# Patient Record
Sex: FEMALE | Race: WHITE | NOT HISPANIC OR LATINO | URBAN - METROPOLITAN AREA
[De-identification: names, ages, dates, MRNs, and addresses within clinical notes are randomized per-mention and may not be internally consistent; named-entity substitution may affect disease eponyms.]

---
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
## 2023-06-26 ENCOUNTER — TELEPHONE (OUTPATIENT)
Dept: OBGYN CLINIC | Facility: MEDICAL CENTER | Age: 23
End: 2023-06-26

## 2023-06-26 DIAGNOSIS — N92.6 MISSED MENSES: Primary | ICD-10-CM

## 2023-06-26 NOTE — TELEPHONE ENCOUNTER
Patient called and states she is 3 weeks pregnant and is aware of getting blood work done  Patient did call back to let us know that she is spotting  Patient would like to speak to someone about her concern  Please review when you get a chance   Thank you

## 2023-06-26 NOTE — TELEPHONE ENCOUNTER
Patient called about a positive pregnancy test  lmp 05/08/23  Patient aware to get labs done tomorrow  Please place labs into chart   Thank you

## 2023-06-26 NOTE — TELEPHONE ENCOUNTER
Discussed concerns with patient  Bleeding precautions reviewed  She is c/o spotting, no blood clots  Not filling out a pad  Denies any cramping, recent intercourse  Advised to keep an eye on it and get blood work done  Patient aware to call if symptoms do not improve  Orders for blood work placed

## 2023-06-27 ENCOUNTER — APPOINTMENT (OUTPATIENT)
Dept: LAB | Facility: MEDICAL CENTER | Age: 23
End: 2023-06-27
Payer: COMMERCIAL

## 2023-06-27 DIAGNOSIS — N92.6 MISSED MENSES: ICD-10-CM

## 2023-06-27 LAB
ABO GROUP BLD: NORMAL
ABO GROUP BLD: NORMAL
B-HCG SERPL-ACNC: 141 MIU/ML (ref 0–5)
B-HCG SERPL-ACNC: 141 MIU/ML (ref 0–5)
BLD GP AB SCN SERPL QL: NEGATIVE
BLD GP AB SCN SERPL QL: NEGATIVE
PROGEST SERPL-MCNC: 0.74 NG/ML
PROGEST SERPL-MCNC: 0.74 NG/ML
RH BLD: NEGATIVE
RH BLD: NEGATIVE
SPECIMEN EXPIRATION DATE: NORMAL
SPECIMEN EXPIRATION DATE: NORMAL

## 2023-06-27 PROCEDURE — 86850 RBC ANTIBODY SCREEN: CPT

## 2023-06-27 PROCEDURE — 84144 ASSAY OF PROGESTERONE: CPT

## 2023-06-27 PROCEDURE — 86901 BLOOD TYPING SEROLOGIC RH(D): CPT

## 2023-06-27 PROCEDURE — 36415 COLL VENOUS BLD VENIPUNCTURE: CPT

## 2023-06-27 PROCEDURE — 84702 CHORIONIC GONADOTROPIN TEST: CPT

## 2023-06-27 PROCEDURE — 86900 BLOOD TYPING SEROLOGIC ABO: CPT

## 2023-06-28 DIAGNOSIS — Z32.01 POSITIVE PREGNANCY TEST: Primary | ICD-10-CM

## 2023-06-30 ENCOUNTER — APPOINTMENT (OUTPATIENT)
Dept: LAB | Facility: MEDICAL CENTER | Age: 23
End: 2023-06-30
Payer: COMMERCIAL

## 2023-06-30 DIAGNOSIS — Z32.01 POSITIVE PREGNANCY TEST: ICD-10-CM

## 2023-06-30 LAB
B-HCG SERPL-ACNC: 25 MIU/ML (ref 0–5)
B-HCG SERPL-ACNC: 25 MIU/ML (ref 0–5)

## 2023-06-30 PROCEDURE — 36415 COLL VENOUS BLD VENIPUNCTURE: CPT

## 2023-06-30 PROCEDURE — 84702 CHORIONIC GONADOTROPIN TEST: CPT

## 2023-07-03 ENCOUNTER — TELEPHONE (OUTPATIENT)
Dept: OBGYN CLINIC | Facility: MEDICAL CENTER | Age: 23
End: 2023-07-03

## 2023-07-03 ENCOUNTER — CLINICAL SUPPORT (OUTPATIENT)
Dept: OBGYN CLINIC | Facility: MEDICAL CENTER | Age: 23
End: 2023-07-03
Payer: COMMERCIAL

## 2023-07-03 DIAGNOSIS — Z32.01 POSITIVE PREGNANCY TEST: Primary | ICD-10-CM

## 2023-07-03 DIAGNOSIS — O20.0 THREATENED ABORTION: ICD-10-CM

## 2023-07-03 DIAGNOSIS — Z29.13 NEED FOR RHOGAM DUE TO RH NEGATIVE MOTHER: ICD-10-CM

## 2023-07-03 DIAGNOSIS — O20.9 VAGINAL BLEEDING IN PREGNANCY, FIRST TRIMESTER: Primary | ICD-10-CM

## 2023-07-03 PROCEDURE — 96372 THER/PROPH/DIAG INJ SC/IM: CPT | Performed by: NURSE PRACTITIONER

## 2023-07-03 NOTE — PROGRESS NOTES
Patient here for Rhogam injection. Denies any current vaginal bleeding or any other symptoms. Given IM in right deltoid. Tolerated well. Office supplied.     [address]: [id number]  LOT: [id number]  EXP: 03/09/25

## 2023-07-03 NOTE — TELEPHONE ENCOUNTER
Spoke with patient and reviewed labs. Pt aware of completing follow up hcg in 1 week.  Reviewed blood type and patient is negative and advised to come into office for rhogam this pm. She is agreeable and scheduled

## 2023-07-03 NOTE — TELEPHONE ENCOUNTER
PT would like a call back to discuss miscarriage. PT would like a call back @ [phone]. Thank you.

## 2023-07-06 ENCOUNTER — OFFICE VISIT (OUTPATIENT)
Dept: OBGYN CLINIC | Facility: MEDICAL CENTER | Age: 23
End: 2023-07-06
Payer: COMMERCIAL

## 2023-07-06 VITALS
WEIGHT: 183.2 LBS | BODY MASS INDEX: 32.46 KG/M2 | SYSTOLIC BLOOD PRESSURE: 120 MMHG | DIASTOLIC BLOOD PRESSURE: 74 MMHG | HEIGHT: 63 IN

## 2023-07-06 DIAGNOSIS — Z67.91 BLOOD TYPE, RH NEGATIVE: ICD-10-CM

## 2023-07-06 DIAGNOSIS — O03.9 COMPLETE MISCARRIAGE: Primary | ICD-10-CM

## 2023-07-06 PROCEDURE — 99203 OFFICE O/P NEW LOW 30 MIN: CPT | Performed by: STUDENT IN AN ORGANIZED HEALTH CARE EDUCATION/TRAINING PROGRAM

## 2023-07-06 RX ORDER — HYDROXYZINE HYDROCHLORIDE 25 MG/1
25 TABLET, FILM COATED ORAL EVERY 6 HOURS PRN
COMMUNITY

## 2023-07-06 RX ORDER — TOPIRAMATE 50 MG/1
TABLET, FILM COATED ORAL
COMMUNITY
Start: 2023-06-15

## 2023-07-06 RX ORDER — QUETIAPINE FUMARATE 200 MG/1
TABLET, FILM COATED ORAL
COMMUNITY
Start: 2023-06-29

## 2023-07-06 NOTE — PROGRESS NOTES
[facility] [address] [place] [address]    Assessment/Plan:  Cristy Rangel is a 21 y.o.  who presents to establish care after complete miscarriage. Blood type, Rh negative  - She has received Rhogam    Complete miscarriage  - Reviewed miscarriage counseling  - PHQ-2 is 0  - Discussed plan for future pregnancy, no high-quality data to support delaying conception  - Recommend folate supplementation via prenatal vitamin  - Recommend she return for annual with cervical cancer screening    Diagnoses and all orders for this visit:    Complete miscarriage  -     CBC and Platelet; Future  -     TSH, 3rd generation with Free T4 reflex; Future  -     HEMOGLOBIN A1C W/ EAG ESTIMATION; Future    Blood type, Rh negative            Subjective:   Cristy Rangel is a 21 y.o. No obstetric history on file. female. CC: Miscarriage follow up    HPI: Lauren Rosales presents for miscarriage follow up. Pregnancy was planned and desired. ROS: Review of Systems   Constitutional: Negative for chills and fever. Respiratory: Negative for cough and shortness of breath. Cardiovascular: Negative for chest pain and leg swelling. Gastrointestinal: Negative for abdominal pain, nausea and vomiting. Genitourinary: Negative for dysuria, frequency and urgency. Neurological: Negative for dizziness, light-headedness and headaches. PFSH: The following portions of the patient's history were reviewed and updated as appropriate: allergies, current medications, past family history, past medical history, obstetric history, gynecologic history, past social history, past surgical history and problem list.       Objective:  /74   Ht 5' 3" (1.6 m)   Wt 83.1 kg (183 lb 3.2 oz)   BMI 32.45 kg/m²    Physical Exam  Constitutional:       Appearance: Normal appearance. Cardiovascular:      Rate and Rhythm: Normal rate.    Pulmonary:      Effort: Pulmonary effort is normal.   Neurological: Mental Status: She is alert.    Psychiatric:         Mood and Affect: Mood normal.         Behavior: Behavior normal.           Paul Novak MD  [id number] [address]  7/6/2023 2:30 PM

## 2023-07-06 NOTE — ASSESSMENT & PLAN NOTE
- Reviewed miscarriage counseling  - PHQ-2 is 0  - Discussed plan for future pregnancy, no high-quality data to support delaying conception  - Recommend folate supplementation via prenatal vitamin  - Recommend she return for annual with cervical cancer screening

## 2023-08-09 ENCOUNTER — TELEPHONE (OUTPATIENT)
Dept: OBGYN CLINIC | Facility: MEDICAL CENTER | Age: 23
End: 2023-08-09

## 2023-08-09 ENCOUNTER — OFFICE VISIT (OUTPATIENT)
Dept: OBGYN CLINIC | Facility: MEDICAL CENTER | Age: 23
End: 2023-08-09
Payer: COMMERCIAL

## 2023-08-09 VITALS
DIASTOLIC BLOOD PRESSURE: 74 MMHG | BODY MASS INDEX: 31.45 KG/M2 | SYSTOLIC BLOOD PRESSURE: 118 MMHG | HEIGHT: 63 IN | WEIGHT: 177.5 LBS

## 2023-08-09 DIAGNOSIS — Z30.011 ENCOUNTER FOR INITIAL PRESCRIPTION OF CONTRACEPTIVE PILLS: Primary | ICD-10-CM

## 2023-08-09 PROCEDURE — 99213 OFFICE O/P EST LOW 20 MIN: CPT | Performed by: OBSTETRICS & GYNECOLOGY

## 2023-08-09 RX ORDER — DROSPIRENONE AND ETHINYL ESTRADIOL 0.02-3(28)
1 KIT ORAL DAILY
Qty: 84 TABLET | Refills: 1 | Status: SHIPPED | OUTPATIENT
Start: 2023-08-09

## 2023-08-09 RX ORDER — TOPIRAMATE 100 MG/1
100 TABLET, FILM COATED ORAL 2 TIMES DAILY
COMMUNITY
Start: 2023-07-19

## 2023-08-09 RX ORDER — HYDROXYZINE PAMOATE 25 MG/1
CAPSULE ORAL
COMMUNITY
Start: 2023-08-01

## 2023-08-09 RX ORDER — ROPINIROLE 1 MG/1
1 TABLET, FILM COATED ORAL
COMMUNITY
Start: 2023-07-19

## 2023-08-09 NOTE — PROGRESS NOTES
Assessment:  21 y.o.  who presents for contraception. Desires OCP. Discussed effects of topamax on OCP efficacy. Doses </= 200mg not associated with dec efficacy, but encouraged to call if dose increased. Plan:  Diagnoses and all orders for this visit:    Encounter for initial prescription of contraceptive pills  -     drospirenone-ethinyl estradiol (LUIS) 3-0.02 MG per tablet; Take 1 tablet by mouth daily  - Return in 3mo for OCP f/u         __________________________________________________________________    Subjective   Judit Nayak is a 21 y.o. [place] who presents to discuss contraception. Not using presently. Main goal is pregnancy prevention and desires OCP. Hx OCP use. Tried a few brands before, no issues with any. Menses are largely regular and no overt concerns/symptoms. Medical hx notable for migraines which she takes topamax 100mg BID for. No aura. No notable cyclic changes in headache pattern or exacerbation with prior OCP use. No hx VTE or family hx of same. Discussed contraception in detail -- pill, patch, ring, depo, iud, nexplanon. Prefers cyclic methods and focused conversation on options here. Discussed importance of taking/changing method on time, side effects, and efficacy changes with meds like topamax. Discussed immediate vs cyclic start, and given recent menses will start immediately. Questions answered to pt satisfaction. The following portions of the patient's history were reviewed and updated as appropriate: allergies, current medications, past medical history, past social history, past surgical history and problem list.    Review of Systems  Review of Systems   Constitutional: Negative for chills and fever. Respiratory: Negative for shortness of breath. Cardiovascular: Negative for chest pain and palpitations. Gastrointestinal: Negative for abdominal distention, abdominal pain, constipation, diarrhea, nausea and vomiting.    Genitourinary: Negative for dysuria, flank pain, genital sores, menstrual problem, pelvic pain, vaginal bleeding, vaginal discharge and vaginal pain. Neurological: Negative for dizziness, light-headedness and headaches. Objective  /74   Ht 5' 3" (1.6 m)   Wt 80.5 kg (177 lb 8 oz)   LMP 08/03/2023 (Exact Date)   BMI 31.44 kg/m²      Physical Exam:  Physical Exam  Constitutional:       General: She is not in acute distress. Appearance: Normal appearance. She is not ill-appearing, toxic-appearing or diaphoretic. Eyes:      General: No scleral icterus. Right eye: No discharge. Left eye: No discharge. Conjunctiva/sclera: Conjunctivae normal.   Cardiovascular:      Rate and Rhythm: Normal rate. Pulmonary:      Effort: Pulmonary effort is normal. No respiratory distress. Abdominal:      General: There is no distension. Palpations: There is no mass. Tenderness: There is no abdominal tenderness. There is no guarding or rebound. Hernia: No hernia is present. Musculoskeletal:         General: No swelling. Skin:     General: Skin is warm and dry. Coloration: Skin is not jaundiced or pale. Findings: No bruising or erythema. Neurological:      Mental Status: She is alert. Psychiatric:         Mood and Affect: Mood normal.         Behavior: Behavior normal.         Thought Content:  Thought content normal.         Judgment: Judgment normal.

## 2023-08-11 ENCOUNTER — TELEPHONE (OUTPATIENT)
Dept: OBGYN CLINIC | Facility: MEDICAL CENTER | Age: 23
End: 2023-08-11

## 2023-08-11 NOTE — TELEPHONE ENCOUNTER
Patient lmom for office to discuss her birth control. Upon return call patient stated that she has had some irregular and spotting since starting her bc. Pt is aware it may take up to 3-4 months for the body to get used to the medication. Pt states bleeding is only when she wipes. Cramping is very minimal and consistent with menses. Advised patient to keep an eye on the bleeding and call the office if increases to filling a pad in less than an hour.  Pt aware and agreeable

## 2023-11-06 ENCOUNTER — OFFICE VISIT (OUTPATIENT)
Dept: URGENT CARE | Facility: CLINIC | Age: 23
End: 2023-11-06
Payer: COMMERCIAL

## 2023-11-06 VITALS
HEART RATE: 113 BPM | OXYGEN SATURATION: 99 % | TEMPERATURE: 98 F | RESPIRATION RATE: 18 BRPM | HEIGHT: 63 IN | BODY MASS INDEX: 31.44 KG/M2 | DIASTOLIC BLOOD PRESSURE: 91 MMHG | SYSTOLIC BLOOD PRESSURE: 137 MMHG

## 2023-11-06 DIAGNOSIS — N93.9 VAGINAL BLEEDING: Primary | ICD-10-CM

## 2023-11-06 LAB — SL AMB POCT URINE HCG: NEGATIVE

## 2023-11-06 PROCEDURE — 81025 URINE PREGNANCY TEST: CPT | Performed by: NURSE PRACTITIONER

## 2023-11-06 PROCEDURE — 99213 OFFICE O/P EST LOW 20 MIN: CPT | Performed by: NURSE PRACTITIONER

## 2023-11-06 RX ORDER — QUETIAPINE FUMARATE 50 MG/1
TABLET, FILM COATED ORAL
COMMUNITY
Start: 2023-09-20

## 2023-11-06 RX ORDER — VENLAFAXINE HYDROCHLORIDE 75 MG/1
150 CAPSULE, EXTENDED RELEASE ORAL
COMMUNITY
Start: 2023-08-25

## 2023-11-06 RX ORDER — VALACYCLOVIR HYDROCHLORIDE 1 G/1
TABLET, FILM COATED ORAL
COMMUNITY
Start: 2023-10-28

## 2023-11-06 RX ORDER — NAPROXEN 500 MG/1
500 TABLET ORAL EVERY 12 HOURS PRN
COMMUNITY
Start: 2023-10-12 | End: 2024-10-11

## 2023-11-06 NOTE — PROGRESS NOTES
[facility]        NAME: Oscar Lezama is a 21 y.o. female  : 2000    MRN: [id number]  DATE: 2023  TIME: 1:43 PM    Assessment and Plan   Vaginal bleeding [N93.9]  1. Vaginal bleeding  POCT urine HCG        Advised to call GYN for lab work for evaluation as her urine pregnancy test is negative and she is Rh-. Patient verbalized understanding. Patient asked for work note for yesterday. Work note provided stating she was seen today and cleared to return to work. Patient agreement plan. Patient Instructions     Follow up with PCP in 3-5 days. Proceed to  ER if symptoms worsen. Chief Complaint     Chief Complaint   Patient presents with    Vaginal Bleeding     Patient states that she think that she might be having a miscarriage. She has a headache, heavy bleeding and vomiting. She states that she had one         History of Present Illness   Oscar Lezama presents to the clinic c/o    Vaginal Bleeding (Patient states that she think that she might be having a miscarriage. She has a headache, heavy bleeding and vomiting. She states that she had one)  Patient states had a miscarriage in . She states her bleeding is having 1 day not being the max having again after that. Has had vomiting, nausea and headache. She did not do a urine pregnancy test.  She states she is Rh- that is why she is concerned. She started a new birth control 3 months ago in the past month and a half she has had vaginal bleeding every other week. Has a follow-up to discuss birth control with GYN in about 7 days. Review of Systems   Review of Systems   All other systems reviewed and are negative.         Current Medications     Long-Term Medications   Medication Sig Dispense Refill    drospirenone-ethinyl estradiol (LUIS) 3-0.02 MG per tablet Take 1 tablet by mouth daily 84 tablet 1    hydrOXYzine HCL (ATARAX) 25 mg tablet Take 25 mg by mouth every 6 (six) hours as needed hydrOXYzine pamoate (VISTARIL) 25 mg capsule TAKE 1 CAPSULE BY MOUTH EVERY SIX HOURS FOR ANXIETY      naproxen (NAPROSYN) 500 mg tablet Take 500 mg by mouth every 12 (twelve) hours as needed      QUEtiapine (SEROquel) 200 mg tablet TAKE 1.5 TABLETS (300 MG TOTAL) BY MOUTH NIGHTLY. QUEtiapine (SEROquel) 50 mg tablet TAKE 1 TABLET BY MOUTH AT BEDTIME TAKE WITH 200MG = 250 AT BEDTIME      rOPINIRole (REQUIP) 1 mg tablet Take 1 mg by mouth daily at bedtime      topiramate (TOPAMAX) 100 mg tablet Take 100 mg by mouth 2 (two) times a day      venlafaxine (EFFEXOR-XR) 75 mg 24 hr capsule 150 mg      topiramate (TOPAMAX) 50 MG tablet TAKE 1 TABLET BY MOUTH EVERY 12 HOURS AS DIRECTED (Patient not taking: Reported on 8/9/2023)      valACYclovir (VALTREX) 1,000 mg tablet TAKE 1 TABLET BY MOUTH TWICE A DAY FOR 7 DAYS (Patient not taking: Reported on 11/6/2023)         Current Allergies     Allergies as of 11/06/2023 - Reviewed 11/06/2023   Allergen Reaction Noted    Bactrim [sulfamethoxazole-trimethoprim] Itching 01/29/2018    Latex Itching 10/09/2020    Nickel Itching 07/26/2021    Amoxicillin Diarrhea 01/29/2018    Penicillins Diarrhea and Hives 03/17/2015            The following portions of the patient's history were reviewed and updated as appropriate: allergies, current medications, past family history, past medical history, past social history, past surgical history and problem list.    Objective   /91   Pulse (!) 113   Temp 98 °F (36.7 °C) (Tympanic)   Resp 18   Ht 5' 3" (1.6 m)   LMP  (LMP Unknown)   SpO2 99%   BMI 31.44 kg/m²        Physical Exam     Physical Exam  Vitals and nursing note reviewed. Constitutional:       Appearance: Normal appearance. She is well-developed. HENT:      Head: Normocephalic and atraumatic.       Right Ear: Hearing, tympanic membrane, ear canal and external ear normal.      Left Ear: Hearing, tympanic membrane, ear canal and external ear normal.      Nose: Nose normal. Mouth/Throat:      Lips: Pink. Mouth: Mucous membranes are moist.      Pharynx: Oropharynx is clear. Eyes:      General: Lids are normal.      Conjunctiva/sclera: Conjunctivae normal.      Pupils: Pupils are equal, round, and reactive to light. Cardiovascular:      Rate and Rhythm: Normal rate and regular rhythm. Heart sounds: Normal heart sounds, S1 normal and S2 normal.   Pulmonary:      Effort: Pulmonary effort is normal.      Breath sounds: Normal breath sounds. Abdominal:      General: Abdomen is flat. Bowel sounds are normal.      Palpations: Abdomen is soft. Musculoskeletal:         General: Normal range of motion. Cervical back: Full passive range of motion without pain, normal range of motion and neck supple. Skin:     General: Skin is warm and dry. Neurological:      General: No focal deficit present. Mental Status: She is alert and oriented to person, place, and time. Psychiatric:         Mood and Affect: Mood normal.         Speech: Speech normal.         Behavior: Behavior normal. Behavior is cooperative. Thought Content:  Thought content normal.         Judgment: Judgment normal.

## 2023-11-06 NOTE — LETTER
November 6, 2023     Patient: Krystina Sanchez   YOB: 2000   Date of Visit: 11/6/2023       To Whom It May Concern: It is my medical opinion that Krystina Sanchez may return to work on 11/7/2023 . If you have any questions or concerns, please don't hesitate to call.          Sincerely,        Zenobia Bumpers, CRNP    CC: No Recipients

## 2023-11-14 ENCOUNTER — ANNUAL EXAM (OUTPATIENT)
Dept: OBGYN CLINIC | Facility: MEDICAL CENTER | Age: 23
End: 2023-11-14
Payer: COMMERCIAL

## 2023-11-14 VITALS
BODY MASS INDEX: 28.56 KG/M2 | SYSTOLIC BLOOD PRESSURE: 110 MMHG | DIASTOLIC BLOOD PRESSURE: 66 MMHG | WEIGHT: 161.2 LBS | HEIGHT: 63 IN

## 2023-11-14 DIAGNOSIS — N93.9 ABNORMAL UTERINE BLEEDING (AUB): ICD-10-CM

## 2023-11-14 DIAGNOSIS — Z01.419 WELL WOMAN EXAM WITH ROUTINE GYNECOLOGICAL EXAM: Primary | ICD-10-CM

## 2023-11-14 DIAGNOSIS — Z30.41 ENCOUNTER FOR SURVEILLANCE OF CONTRACEPTIVE PILLS: ICD-10-CM

## 2023-11-14 PROCEDURE — 99395 PREV VISIT EST AGE 18-39: CPT | Performed by: OBSTETRICS & GYNECOLOGY

## 2023-11-14 PROCEDURE — G0145 SCR C/V CYTO,THINLAYER,RESCR: HCPCS | Performed by: OBSTETRICS & GYNECOLOGY

## 2023-11-14 RX ORDER — LEVONORGESTREL AND ETHINYL ESTRADIOL 0.1-0.02MG
1 KIT ORAL DAILY
Qty: 84 TABLET | Refills: 1 | Status: SHIPPED | OUTPATIENT
Start: 2023-11-14

## 2023-11-14 NOTE — PROGRESS NOTES
Assessment   21 y.o.  presenting for annual exam.     Plan   Diagnoses and all orders for this visit:    Well woman exam with routine gynecological exam  -     Liquid-based pap, screening  -     Molecular Hold Sample  - Continue SBE  - Return in 1yr for yearly    Abnormal uterine bleeding (AUB)  -     Follicle stimulating hormone; Future  -     Luteinizing hormone; Future  -     Estradiol; Future  -     TSH, 3rd generation with Free T4 reflex; Future  -     Prolactin; Future  -     US pelvis complete w transvaginal; Future  -     CBC and differential; Future  -     Anemia Panel w/Reflex, OB; Future    Encounter for surveillance of contraceptive pills  -     levonorgestrel-ethinyl estradiol (Aviane) 0.1-20 MG-MCG per tablet; Take 1 tablet by mouth daily  - Return in 3mo for OCP check    __________________________________________________________________      Subjective     Trevin Lucas is a 21 y.o.  with irregular menses who is sexually active and currently using contraception (Mariiaana Leyden) presenting for annual exam.     Getting menses 2x/mo. Occurring q2 wks. Clotting noted. Fareedjustino Rasmussen. Cramping like a menses/. Baseline pelvic pain. Pressure-like, midline. No radiation. GYN  Complaints: as above  Denies dyspareunia, genital discharge, genital ulcers, and vulvar/vaginal symptoms  Periods are regular every 2 weeks, lasting 5 days. Dysmenorrhea:moderate, occurring first 1-2 days of flow. Cyclic symptoms include none  Sexually active: Yes - single partner - male  Hx STI: HSV, new dx this year.    Hx Abnormal pap: never had  Last pap: n/a    OB         Complaints: denies  Denies urinary frequency, hematuria, urinary incontinence, and dysuria    BREAST  Complaints: denies  Denies: breast lump, breast tenderness, changed mole, dryness, nipple discharge, pruritus, rash, skin color change, and skin lesion(s)  Last mammogram: n/a  Personal hx: denies  Family hx: denies fhx of breast, uterine, ovarian, or colon cancers  Patient does do regular self-exams    GENERAL  PMH reviewed/updated and is as below. Patient does follow with a PCP. Works at [employer]  Denies domestic violence. SCREENING  Cervical Ca: pap collected  Breast Ca: SBE encouraged  Colon Ca: not indicated by age      History reviewed. No pertinent past medical history.     Past Surgical History:   Procedure Laterality Date    CYST REMOVAL      inguinal cyst    WISDOM TOOTH EXTRACTION           Current Outpatient Medications:     drospirenone-ethinyl estradiol (LUIS) 3-0.02 MG per tablet, Take 1 tablet by mouth daily, Disp: 84 tablet, Rfl: 1    hydrOXYzine HCL (ATARAX) 25 mg tablet, Take 25 mg by mouth every 6 (six) hours as needed, Disp: , Rfl:     naproxen (NAPROSYN) 500 mg tablet, Take 500 mg by mouth every 12 (twelve) hours as needed, Disp: , Rfl:     QUEtiapine (SEROquel) 200 mg tablet, TAKE 1.5 TABLETS (300 MG TOTAL) BY MOUTH NIGHTLY., Disp: , Rfl:     rOPINIRole (REQUIP) 1 mg tablet, Take 1 mg by mouth daily at bedtime, Disp: , Rfl:     topiramate (TOPAMAX) 100 mg tablet, Take 100 mg by mouth 2 (two) times a day, Disp: , Rfl:     venlafaxine (EFFEXOR-XR) 75 mg 24 hr capsule, 150 mg, Disp: , Rfl:     hydrOXYzine pamoate (VISTARIL) 25 mg capsule, TAKE 1 CAPSULE BY MOUTH EVERY SIX HOURS FOR ANXIETY (Patient not taking: Reported on 11/14/2023), Disp: , Rfl:     QUEtiapine (SEROquel) 50 mg tablet, TAKE 1 TABLET BY MOUTH AT BEDTIME TAKE WITH 200MG = 250 AT BEDTIME (Patient not taking: Reported on 11/14/2023), Disp: , Rfl:     topiramate (TOPAMAX) 50 MG tablet, TAKE 1 TABLET BY MOUTH EVERY 12 HOURS AS DIRECTED (Patient not taking: Reported on 8/9/2023), Disp: , Rfl:     valACYclovir (VALTREX) 1,000 mg tablet, TAKE 1 TABLET BY MOUTH TWICE A DAY FOR 7 DAYS (Patient not taking: Reported on 11/6/2023), Disp: , Rfl:     Allergies   Allergen Reactions    Bactrim [Sulfamethoxazole-Trimethoprim] Itching    Latex Itching    Nickel Itching     Skin turns green    Amoxicillin Diarrhea    Penicillins Diarrhea and Hives     Not 100% sure if the hives were from the antibiotics. Social History     Tobacco Use    Smoking status: Never    Smokeless tobacco: Never   Vaping Use    Vaping Use: Every day    Substances: Nicotine   Substance Use Topics    Alcohol use: No    Drug use: No             Objective  /66   Ht 5' 3" (1.6 m)   Wt 73.1 kg (161 lb 3.2 oz)   LMP 11/03/2023 (Approximate)   BMI 28.56 kg/m²      Physical Exam:  Physical Exam  Exam conducted with a chaperone present. Constitutional:       General: She is not in acute distress. Appearance: Normal appearance. She is well-developed. She is not ill-appearing, toxic-appearing or diaphoretic. HENT:      Head: Normocephalic and atraumatic. Eyes:      General: No scleral icterus. Right eye: No discharge. Left eye: No discharge. Conjunctiva/sclera: Conjunctivae normal.   Cardiovascular:      Rate and Rhythm: Normal rate. Pulmonary:      Effort: Pulmonary effort is normal. No accessory muscle usage or respiratory distress. Chest:   Breasts:     Breasts are symmetrical.      Right: No inverted nipple, mass, nipple discharge, skin change or tenderness. Left: No inverted nipple, mass, nipple discharge, skin change or tenderness. Abdominal:      General: There is no distension. Palpations: Abdomen is soft. There is no mass. Tenderness: There is no abdominal tenderness. There is no guarding or rebound. Genitourinary:     General: Normal vulva. Exam position: Lithotomy position. Labia:         Right: No rash, tenderness or lesion. Left: No rash, tenderness or lesion. Urethra: No prolapse, urethral swelling or urethral lesion. Vagina: No signs of injury. No vaginal discharge, erythema, tenderness, bleeding or lesions. Cervix: No cervical motion tenderness, discharge, friability, lesion or erythema.       Uterus: Not enlarged, not fixed and not tender. Adnexa:         Right: No mass, tenderness or fullness. Left: No mass, tenderness or fullness. Rectum: No external hemorrhoid. Normal anal tone. Lymphadenopathy:      Upper Body:      Right upper body: No axillary or pectoral adenopathy. Left upper body: No axillary or pectoral adenopathy. Skin:     General: Skin is warm and dry. Findings: No erythema or rash. Neurological:      Mental Status: She is alert. Psychiatric:         Mood and Affect: Mood normal.         Behavior: Behavior normal.         Thought Content:  Thought content normal.         Judgment: Judgment normal.

## 2023-11-24 LAB
LAB AP GYN PRIMARY INTERPRETATION: NORMAL
Lab: NORMAL

## 2023-12-04 ENCOUNTER — LAB (OUTPATIENT)
Dept: LAB | Facility: MEDICAL CENTER | Age: 23
End: 2023-12-04
Payer: COMMERCIAL

## 2023-12-04 DIAGNOSIS — O03.9 COMPLETE MISCARRIAGE: ICD-10-CM

## 2023-12-04 DIAGNOSIS — N93.9 ABNORMAL UTERINE BLEEDING (AUB): ICD-10-CM

## 2023-12-04 DIAGNOSIS — O20.0 THREATENED ABORTION: ICD-10-CM

## 2023-12-04 LAB
BASOPHILS # BLD AUTO: 0.06 THOUSANDS/ÂΜL (ref 0–0.1)
BASOPHILS NFR BLD AUTO: 1 % (ref 0–1)
EOSINOPHIL # BLD AUTO: 0.17 THOUSAND/ÂΜL (ref 0–0.61)
EOSINOPHIL NFR BLD AUTO: 2 % (ref 0–6)
ERYTHROCYTE [DISTWIDTH] IN BLOOD BY AUTOMATED COUNT: 16.1 % (ref 11.6–15.1)
ESTRADIOL SERPL-MCNC: 86.3 PG/ML
FSH SERPL-ACNC: 5.3 MIU/ML
HCT VFR BLD AUTO: 40.5 % (ref 34.8–46.1)
HGB BLD-MCNC: 12.8 G/DL (ref 11.5–15.4)
IMM GRANULOCYTES # BLD AUTO: 0.02 THOUSAND/UL (ref 0–0.2)
IMM GRANULOCYTES NFR BLD AUTO: 0 % (ref 0–2)
LH SERPL-ACNC: 4.7 MIU/ML
LYMPHOCYTES # BLD AUTO: 3.52 THOUSANDS/ÂΜL (ref 0.6–4.47)
LYMPHOCYTES NFR BLD AUTO: 42 % (ref 14–44)
MCH RBC QN AUTO: 30.1 PG (ref 26.8–34.3)
MCHC RBC AUTO-ENTMCNC: 31.6 G/DL (ref 31.4–37.4)
MCV RBC AUTO: 95 FL (ref 82–98)
MONOCYTES # BLD AUTO: 0.67 THOUSAND/ÂΜL (ref 0.17–1.22)
MONOCYTES NFR BLD AUTO: 8 % (ref 4–12)
NEUTROPHILS # BLD AUTO: 4 THOUSANDS/ÂΜL (ref 1.85–7.62)
NEUTS SEG NFR BLD AUTO: 47 % (ref 43–75)
NRBC BLD AUTO-RTO: 0 /100 WBCS
PLATELET # BLD AUTO: 579 THOUSANDS/UL (ref 149–390)
PMV BLD AUTO: 10.5 FL (ref 8.9–12.7)
PROLACTIN SERPL-MCNC: 10.13 NG/ML (ref 3.34–26.72)
RBC # BLD AUTO: 4.25 MILLION/UL (ref 3.81–5.12)
TSH SERPL DL<=0.05 MIU/L-ACNC: 0.83 UIU/ML (ref 0.45–4.5)
WBC # BLD AUTO: 8.44 THOUSAND/UL (ref 4.31–10.16)

## 2023-12-04 PROCEDURE — 84443 ASSAY THYROID STIM HORMONE: CPT

## 2023-12-04 PROCEDURE — 83002 ASSAY OF GONADOTROPIN (LH): CPT

## 2023-12-04 PROCEDURE — 82670 ASSAY OF TOTAL ESTRADIOL: CPT

## 2023-12-04 PROCEDURE — 36415 COLL VENOUS BLD VENIPUNCTURE: CPT

## 2023-12-04 PROCEDURE — 83001 ASSAY OF GONADOTROPIN (FSH): CPT

## 2023-12-04 PROCEDURE — 85025 COMPLETE CBC W/AUTO DIFF WBC: CPT

## 2023-12-04 PROCEDURE — 84146 ASSAY OF PROLACTIN: CPT

## 2024-01-23 ENCOUNTER — TELEPHONE (OUTPATIENT)
Dept: OBGYN CLINIC | Facility: MEDICAL CENTER | Age: 24
End: 2024-01-23

## 2024-01-23 DIAGNOSIS — Z32.01 POSITIVE PREGNANCY TEST: Primary | ICD-10-CM

## 2024-02-01 ENCOUNTER — TELEPHONE (OUTPATIENT)
Dept: OBGYN CLINIC | Facility: MEDICAL CENTER | Age: 24
End: 2024-02-01

## 2024-02-01 NOTE — TELEPHONE ENCOUNTER
Attempted to contact patient to review questions.  If patient calls back into office, she can take Valtrex during pregnancy if having an outbreak

## 2024-02-01 NOTE — TELEPHONE ENCOUNTER
Pt recently found out she pregnant. She had a herpes outbreak in the past her doctor give her valtrex incase it happens again, she feels like she's having a outbreak again she is worry this might affect the pregnancy. Is she fausto to take valtrex for this or she needs an appt ?

## 2024-02-05 ENCOUNTER — LAB (OUTPATIENT)
Dept: LAB | Facility: MEDICAL CENTER | Age: 24
End: 2024-02-05
Payer: COMMERCIAL

## 2024-02-05 ENCOUNTER — TELEPHONE (OUTPATIENT)
Dept: OBGYN CLINIC | Facility: MEDICAL CENTER | Age: 24
End: 2024-02-05

## 2024-02-05 DIAGNOSIS — Z32.01 POSITIVE PREGNANCY TEST: ICD-10-CM

## 2024-02-05 LAB
B-HCG SERPL-ACNC: ABNORMAL MIU/ML (ref 0–5)
PROGEST SERPL-MCNC: 20.85 NG/ML

## 2024-02-05 PROCEDURE — 84144 ASSAY OF PROGESTERONE: CPT

## 2024-02-05 PROCEDURE — 84702 CHORIONIC GONADOTROPIN TEST: CPT

## 2024-02-05 PROCEDURE — 36415 COLL VENOUS BLD VENIPUNCTURE: CPT

## 2024-02-05 NOTE — TELEPHONE ENCOUNTER
Pt called she not felling good have nauseas, headaches and pt have different concerts. Please advise

## 2024-02-05 NOTE — TELEPHONE ENCOUNTER
Called pt and spoke to her explain doctor recommed to take vb6 3x a day to help with the nausea. Pt is aware pt just had blood work done today waiting or results.

## 2024-02-06 ENCOUNTER — TELEPHONE (OUTPATIENT)
Dept: OBGYN CLINIC | Facility: MEDICAL CENTER | Age: 24
End: 2024-02-06

## 2024-02-06 NOTE — TELEPHONE ENCOUNTER
Patient called into office today in regards to wanting to review pregnancy labs, please review, thank you!

## 2024-02-06 NOTE — RESULT ENCOUNTER NOTE
Please call patient with results. Labs consistent with early pregnancy. 6w1d by LMP. Needs US for pregnancy dating/location. Can complete here or with radiology in 1-3wks.

## 2024-02-14 ENCOUNTER — TELEPHONE (OUTPATIENT)
Dept: OBGYN CLINIC | Facility: MEDICAL CENTER | Age: 24
End: 2024-02-14

## 2024-02-14 NOTE — TELEPHONE ENCOUNTER
pt called she has labs done and schedule for 02/21 for ultrasound but she has ultra sound from [facility] pt will like to make an appt. Please advise

## 2024-02-15 NOTE — PATIENT INSTRUCTIONS
Congratulations!! Please review our Pregnancy Essential Guide and [facility] L&D Virtual tour from our networks website.     [facility] Pregnancy Essentials Guide  [facility] ([email or web address])

## 2024-02-16 ENCOUNTER — INITIAL PRENATAL (OUTPATIENT)
Dept: OBGYN CLINIC | Facility: MEDICAL CENTER | Age: 24
End: 2024-02-16
Payer: COMMERCIAL

## 2024-02-16 ENCOUNTER — TELEPHONE (OUTPATIENT)
Facility: HOSPITAL | Age: 24
End: 2024-02-16

## 2024-02-16 VITALS
WEIGHT: 162.2 LBS | HEIGHT: 63 IN | BODY MASS INDEX: 28.74 KG/M2 | SYSTOLIC BLOOD PRESSURE: 104 MMHG | DIASTOLIC BLOOD PRESSURE: 58 MMHG

## 2024-02-16 DIAGNOSIS — Z34.01 ENCOUNTER FOR SUPERVISION OF NORMAL FIRST PREGNANCY IN FIRST TRIMESTER: Primary | ICD-10-CM

## 2024-02-16 PROCEDURE — 99211 OFF/OP EST MAY X REQ PHY/QHP: CPT

## 2024-02-16 NOTE — TELEPHONE ENCOUNTER
Called patient to schedule MFM appointment, based on referral issued to Maternal Fetal Medicine by OB office.    Left voicemail requesting patient to call back and schedule appointment, with office number for return call [phone].

## 2024-02-16 NOTE — PROGRESS NOTES
OB INTAKE INTERVIEW 2024    Patient is 23 y.o. who presents for OB intake at Unknown.  She is accompanied by her significant other during this encounter.  The father of her baby (Erin Arambula) is involved in the pregnancy and he is 23 years old.      Patient's last menstrual period was 2023.  Ultrasound: Measured 6 weeks 4 days on 2024  Estimated Date of Delivery: None noted. confirmed by dating ultrasound.    Signs/Symptoms of Pregnancy  Current pregnancy symptoms: breast tenderness, fatigue, nausea, vomiting, and frequent urination  Constipation YES  Headaches no  Cramping/spotting YES - occasional cramping  PICA cravings no    Diabetes-  Body mass index is 28.73 kg/m².  If patient has 1 or more, please order early 1 hour GTT  History of GDM no  BMI >35 no  History of PCOS or current metformin use no  History of LGA/macrosomic infant (4000g/9lbs) no    If patient has 2 or more, please order early 1 hour GTT  BMI>30 no  AMA no  First degree relative with type 2 diabetes no  History of chronic HTN, hyperlipidemia, elevated A1C no  High risk race (, , ,  or ) no    Hypertension- if you answer yes to any of the following, please order baseline preeclampsia labs (cbc, comprehensive metabolic panel, urine protein creatinine ratio, uric acid)  History of of chronic HTN no  History of gestational HTN no  History of preeclampsia, eclampsia, or HELLP syndrome no  History of diabetes no  History of lupus, autoimmune disease, kidney disease no    Thyroid- if yes order TSH with reflex T4  History of thyroid disease no    Bleeding Disorder or Hx of DVT-patient or first degree relative with history of. Order the following if not done previously.   (Factor V, antithrombin III, prothrombin gene mutation, protein C and S Ag, lupus anticoagulant, anticardiolipin, beta-2 glycoprotein)   no    OB/GYN-  History of abnormal pap smear no       Date of  last pap smear 2023  History of HPV no  History of Herpes/HSV YES  History of other STI (gonorrhea, chlamydia, trich) no  History of prior  no  History of prior  no  History of  delivery prior to 36 weeks 6 days no  History of blood transfusion no  Ok for blood transfusion YES    Substance screening-   History of tobacco use YES  Currently using tobacco YES - trying to quit vaping  Currently using alcohol no  Presently using drugs no  Past drug use  YES  IV drug use- no  Partner drug use no  Parent/Family drug use YES    Substance screening-   History of tobacco use no  Currently using tobacco YES  Substance Use Screen Level (N/A, LOW, HIGH) LOW    MRSA Screening-   Does the pt have a hx of MRSA? no    Immunizations:  Influenza vaccine given this season NO   Discussed Tdap vaccine YES   Discussed COVID Vaccine YES     Genetic/MFM-  Do you or your partner have a history of any of the following in yourselves or first degree relatives?  Cystic fibrosis no  Spinal muscular atrophy no  Hemoglobinopathy/Sickle Cell/Thalassemia no  Fragile X Intellectual Disability no    If yes, discuss Carrier Screening and recommend consultation with MFM/Genetic Counseling and place specific [facility] Referral for.    If no, discuss Carrier Screening being completed once in a lifetime as a standard of care lab test. Place orders for Cystic Fibrosis Gene Test (LNA526) and Spinal Muscular Atrophy DNA ([id number]).  Patient does desire testing for Cystic Fibrosis and Spinal Muscular Atrophy.      Appointment for Nuchal Translucency Ultrasound at [facility] has not been scheduled.  Referral provided today.    Interview education  [facility] Pregnancy Essentials Book reviewed, discussed and attached to their AVS YES     Nurse/Family Partnership-patient may qualify YES; referral placed YES     Prenatal lab work scripts YES    Extra labs ordered: Cystic Fibrosis gene test and Spinal muscular atrophy DNA    Aspirin/Preeclampsia  Screen    Risk Level Risk Factor Recommendation   LOW Prior Uncomplicated full-term delivery no No Aspirin recommendation        MODERATE Nulliparity YES Recommend low-dose aspirin if     BMI>30 no 2 or more moderate risk factors    Family History Preeclampsia (mother/sister) no     35yr old or greater no      or Low Socioeconomic no     IVF Pregnancy  no     Personal History Risks (low birth weight, prior adverse preg outcome, >10yr preg interval) no         HIGH History of Preeclampsia no Recommend low-dose aspirin if     Multifetal gestation no 1 or more high risk factors    Chronic HTN no     Type 1 or 2 Diabetes no     Renal Disease no     Autoimmune Disease  no      Contraindications to ASA therapy:  NSAID/ ASA allergy: no  Nasal polyps: no  Asthma with history of ASA induced bronchospasm: no  Relative contraindications:  History of GI bleed: no  Active peptic ulcer disease: no  Severe hepatic dysfunction: no    Patient does not meet recommendation to take ASA 162mg during this pregnancy from 12-36wks to lower her risk of preeclampsia.    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  Reviewed medications safe to take in pregnancy. Reviewed the importance of seeing a dentis through her pregnancy, states she has not seen on since 2022. [facility] Essentials packet/link reviewed, f/u prenatal visit scheduled .  Pt EPDS score 20, pt states that she took herself off all psych meds at approx 4 wks, was seen by her by psych provider who told her that her OB will take care of her medication, Also states she was told they have a new provider that will specifically see pregnant  pts.  Reviewed it is imperative for her to reach out to them to schedule appointment. Pt denies thoughts of self harm at this visit. I also reached out to [facility], they stated that she does have a f/u appointment 2/23/24.  Reviewed NFP program, pt agreeable, order placed    PN1 visit scheduled. The patient was oriented to our practice, the navigator role, reviewed delivering physicians and [facility] for delivery. All questions were answered.    Interviewed by: Moira Lovell RN

## 2024-02-16 NOTE — LETTER
February 16, 2024     Patient: Mary Jane Reyes  YOB: 2000  Date of Visit: 2/16/2024      To Whom it May Concern:    Mary Jane Reyes is under my professional care. Mary Jane was seen in our office on 2/16/2024. Mary Jane may return to work on 02/16/24 .    If you have any questions or concerns, please don't hesitate to call.         Sincerely,          OBGYN [facility]

## 2024-02-20 PROBLEM — F31.9 BIPOLAR DEPRESSION (HCC): Status: ACTIVE | Noted: 2023-05-09

## 2024-02-20 PROBLEM — F41.9 ANXIETY: Status: ACTIVE | Noted: 2023-05-09

## 2024-02-20 PROBLEM — F32.A DEPRESSION AFFECTING PREGNANCY IN FIRST TRIMESTER, ANTEPARTUM: Status: ACTIVE | Noted: 2024-02-20

## 2024-02-20 PROBLEM — A60.00 HERPES GENITALIA: Status: ACTIVE | Noted: 2024-02-20

## 2024-02-20 PROBLEM — O03.9 COMPLETE MISCARRIAGE: Status: RESOLVED | Noted: 2023-07-06 | Resolved: 2024-02-20

## 2024-02-20 PROBLEM — O99.341 DEPRESSION AFFECTING PREGNANCY IN FIRST TRIMESTER, ANTEPARTUM: Status: ACTIVE | Noted: 2024-02-20

## 2024-02-20 PROBLEM — Z3A.08 8 WEEKS GESTATION OF PREGNANCY: Status: ACTIVE | Noted: 2024-02-20

## 2024-02-20 NOTE — PROGRESS NOTES
"Pregnancy Confirmation Visit  [facility] [address], [place], [place]    Assessment/Plan:  24 y.o.  presenting with missed menses.      Patient's last menstrual period was 2023 (exact date).  EDC - 24  @ 8w+  Had sonogram at [facility] ED - 2/10/24 6w4d 10/4/24 6w4d  Cramping and very early so confirm viability and dating     Today   EDC confirmed with CRL 1.65 cm 8w0 days     Sonogram EDC  10/3      Final EDC 24    by   LMP         - Continue/start prenatal vitamin  - We reviewed her current medications and discussed which are safe to continue in pregnancy  - We briefly discussed options for aneuploidy screening, to be discussed further at the prenatal intake  - Schedule prenatal intake with RN and initial prenatal visit; prenatal labs will be ordered during the prenatal intake      Problem List Items Addressed This Visit          Genitourinary    Herpes genitalia     Valtrex at 35 weeks             Other    Blood type, Rh negative     Rhogam for bleeding   28 week and PP if needed          8 weeks gestation of pregnancy - Primary     Dated at 6w5d by [facility]           Bipolar depression (HCC)     On Seroquel pt stopped the medication          Anxiety     Atarax ok to take in pregnancy               Subjective:    CC: Missed period    Mary Jane Reyes is a 24 y.o.  who presents with missed menses.  Patient's last menstrual period was 2023 (exact date).    Patient notes that this pregnancy was planned and desired.  She was not using contraception at the time of conception. She reports she is certain of her LMP and that she has regular menses. She has has no vaginal bleeding since her LMP.    Objective:  /60   Ht 5' 3\" (1.6 m)   Wt 72.3 kg (159 lb 6.4 oz)   LMP 2023 (Exact Date)   BMI 28.24 kg/m²     Physical Exam:  General: Well appearing, no distress  CV: Regular rate  Respiratory: Unlabored breathing  Abdomen: Soft, " nontender  Extremities: Without edema  Mood and Affect: Appropriate    Transvaginal Pelvic Ultrasound  Johns IUP  Yolk sac: Present  Fetal Pole: Present  CRL consistent with EGA 10/3  Cardiac activity: Present   bpm  No adnexal masses appreciated

## 2024-02-21 ENCOUNTER — ULTRASOUND (OUTPATIENT)
Dept: OBGYN CLINIC | Facility: MEDICAL CENTER | Age: 24
End: 2024-02-21
Payer: COMMERCIAL

## 2024-02-21 VITALS
BODY MASS INDEX: 28.24 KG/M2 | DIASTOLIC BLOOD PRESSURE: 60 MMHG | WEIGHT: 159.4 LBS | HEIGHT: 63 IN | SYSTOLIC BLOOD PRESSURE: 102 MMHG

## 2024-02-21 DIAGNOSIS — Z67.91 BLOOD TYPE, RH NEGATIVE: ICD-10-CM

## 2024-02-21 DIAGNOSIS — F41.9 ANXIETY: ICD-10-CM

## 2024-02-21 DIAGNOSIS — A60.04 HERPES SIMPLEX VULVOVAGINITIS: ICD-10-CM

## 2024-02-21 DIAGNOSIS — O21.9 NAUSEA AND VOMITING OF PREGNANCY, ANTEPARTUM: ICD-10-CM

## 2024-02-21 DIAGNOSIS — N91.2 AMENORRHEA: ICD-10-CM

## 2024-02-21 DIAGNOSIS — Z3A.08 8 WEEKS GESTATION OF PREGNANCY: Primary | ICD-10-CM

## 2024-02-21 DIAGNOSIS — F31.9 BIPOLAR DEPRESSION (HCC): ICD-10-CM

## 2024-02-21 PROCEDURE — 76817 TRANSVAGINAL US OBSTETRIC: CPT | Performed by: OBSTETRICS & GYNECOLOGY

## 2024-02-21 PROCEDURE — 99214 OFFICE O/P EST MOD 30 MIN: CPT | Performed by: OBSTETRICS & GYNECOLOGY

## 2024-02-21 RX ORDER — ONDANSETRON 4 MG/1
4 TABLET, FILM COATED ORAL EVERY 8 HOURS PRN
Qty: 20 TABLET | Refills: 0 | Status: SHIPPED | OUTPATIENT
Start: 2024-02-21

## 2024-02-28 ENCOUNTER — NURSE TRIAGE (OUTPATIENT)
Age: 24
End: 2024-02-28

## 2024-02-28 NOTE — TELEPHONE ENCOUNTER
"Patient called with pink tinged mucous discharge when wiping using the restroom. Patient states only happened once and called the triage line. Patient states had intercourse yesterday evening. Patient denies abdominal pain. Care advice reviewed with patient. Patient aware to call if saturating a pad an hour and/or passing any clots. Patient verbalized understanding.    Reason for Disposition   SPOTTING after sexual intercourse (single or brief episode)    Additional Information   Pregnant < 20 weeks with vaginal bleeding    Answer Assessment - Initial Assessment Questions  1. DISCHARGE: \"Describe the discharge.\" (e.g., white, yellow, green, gray, foamy, cottage cheese-like)      Pink   2. ODOR: \"Is there a bad odor?\"      denies  3. ONSET: \"When did the discharge begin?\"      Once this afternoon when wiping  4. RASH: \"Is there a rash in that area?\" If Yes, ask: \"Describe it.\" (e.g., redness, blisters, sores, bumps)      denies  5. ABDOMINAL PAIN: \"Are you having any abdominal pain?\" If Yes, ask: \"What does it feel like?\" (e.g., crampy, dull, intermittent, constant)       Cramp comes and goes   6. ABDOMINAL PAIN SEVERITY: If present, ask: \"How bad is it?\"  (e.g., Scale 1-10; mild, moderate, or severe)    - MILD (1-3): doesn't interfere with normal activities, abdomen soft and not tender to touch     - MODERATE (4-7): interferes with normal activities or awakens from sleep, tender to touch     - SEVERE (8-10): excruciating pain, doubled over, unable to do any normal activities      denies  7. CAUSE: \"What do you think is causing the discharge?\"      NA  8. OTHER SYMPTOMS: \"Do you have any other symptoms?\" (e.g., fever, itching, vaginal bleeding, pain with urination)      denies  9. LARRY: \"What date are you expecting to deliver?\"       9/30/24  10. PREGNANCY: \"How many weeks pregnant are you?\"        9 weeks 2 days    Answer Assessment - Initial Assessment Questions  1. ONSET: \"When did this bleeding start?\"        This " "afternoon once when wiping  2. DESCRIPTION: \"Describe the bleeding that you are having.\" \"How much bleeding is there?\"     - SPOTTING: spotting, or pinkish / brownish mucous discharge; does not fill panti-liner or pad     - MILD:  less than 1 pad / hour; less than patient's usual menstrual bleeding    - MODERATE: 1-2 pads / hour; 1 menstrual cup every 6 hours; small-medium blood clots (e.g., pea, grape, small coin)    - SEVERE: soaking 2 or more pads/hour for 2 or more hours; 1 menstrual cup every 2 hours; bleeding not contained by pads or continuous red blood from vagina; large blood clots (e.g., golf ball, large coin)       Spotting when wiping  3. ABDOMINAL PAIN SEVERITY: If present, ask: \"How bad is it?\"  (e.g., Scale 1-10; mild, moderate, or severe)    - MILD (1-3): doesn't interfere with normal activities, abdomen soft and not tender to touch     - MODERATE (4-7): interferes with normal activities or awakens from sleep, tender to touch     - SEVERE (8-10): excruciating pain, doubled over, unable to do any normal activities      denies  4. PREGNANCY: \"Do you know how many weeks or months pregnant you are?\" \"When was the first day of your last normal menstrual period?\"      9 weeks 2 days  5. HEMODYNAMIC STATUS: \"Are you weak or feeling lightheaded?\" If Yes, ask: \"Can you stand and walk normally?\"       denies  6. OTHER SYMPTOMS: \"What other symptoms are you having with the bleeding?\" (e.g., passed tissue, vaginal discharge, fever, menstrual-type cramps)      denies    Protocols used: Pregnancy - Vaginal Discharge-ADULT-[place], Pregnancy - Vaginal Bleeding Less Than 20 Weeks EGA-ADULT-[place]    "

## 2024-03-08 ENCOUNTER — NURSE TRIAGE (OUTPATIENT)
Age: 24
End: 2024-03-08

## 2024-03-08 NOTE — TELEPHONE ENCOUNTER
"Patient called in with complaints of hives.  They only appear on her upper arm and states this only happens at work and for the past week.  She has used hydrocortisone cream and they have gone away.  They have no spread anywhere. Denies exposure to any new foods, detergents, lotions.     Advised patient to continue to monitor at home.  Recommended the use of the hydrocortisone cream as well as benadryl as needed.  Recommended to try and notice when she gets them at work and what she was doing prior to see if there is any correlation    She has no further questions at this time.  Advised to call office if hives begin to spread or develops new symptoms    Reason for Disposition   Localized hives    Answer Assessment - Initial Assessment Questions  1. APPEARANCE: \"What does the rash look like?\"       Red, looks like a sore    2. LOCATION: \"Where is the rash located?\"       Upper arms    3. NUMBER: \"How many hives are there?\"       2-3    4. SIZE: \"How big are the hives?\" (inches, cm, compare to coins) \"Do they all look the same or is there lots of variation in shape and size?\"       Not bigger than a jt    5. ONSET: \"When did the hives begin?\" (Hours or days ago)       A couple days    6. ITCHING: \"Does it itch?\" If Yes, ask: \"How bad is the itch?\"     - MILD: doesn't interfere with normal activities    - MODERATE-SEVERE: interferes with work, school, sleep, or other activities       Mild     7. RECURRENT PROBLEM: \"Have you had hives before?\" If Yes, ask: \"When was the last time?\" and \"What happened that time?\"       N/a    8. TRIGGERS: \"Were you exposed to any new food, plant, cosmetic product or animal just before the hives began?\"      denies    9. OTHER SYMPTOMS: \"Do you have any other symptoms?\" (e.g., fever, tongue swelling, difficulty breathing, abdominal pain)      Denies    10. PREGNANCY: \"Is there any chance you are pregnant?\" \"When was your last menstrual period?\"        10w4d    Protocols used: " Hives-ADULT-[place]

## 2024-03-18 ENCOUNTER — APPOINTMENT (OUTPATIENT)
Dept: LAB | Facility: MEDICAL CENTER | Age: 24
End: 2024-03-18
Payer: COMMERCIAL

## 2024-03-18 DIAGNOSIS — Z34.01 ENCOUNTER FOR SUPERVISION OF NORMAL FIRST PREGNANCY IN FIRST TRIMESTER: ICD-10-CM

## 2024-03-18 PROCEDURE — 36415 COLL VENOUS BLD VENIPUNCTURE: CPT

## 2024-03-18 PROCEDURE — 86762 RUBELLA ANTIBODY: CPT

## 2024-03-18 PROCEDURE — 87389 HIV-1 AG W/HIV-1&-2 AB AG IA: CPT

## 2024-03-18 PROCEDURE — 86900 BLOOD TYPING SEROLOGIC ABO: CPT

## 2024-03-18 PROCEDURE — 85025 COMPLETE CBC W/AUTO DIFF WBC: CPT

## 2024-03-18 PROCEDURE — 86706 HEP B SURFACE ANTIBODY: CPT

## 2024-03-18 PROCEDURE — 86901 BLOOD TYPING SEROLOGIC RH(D): CPT

## 2024-03-18 PROCEDURE — 86803 HEPATITIS C AB TEST: CPT

## 2024-03-18 PROCEDURE — 86780 TREPONEMA PALLIDUM: CPT

## 2024-03-18 PROCEDURE — 81001 URINALYSIS AUTO W/SCOPE: CPT

## 2024-03-18 PROCEDURE — 86850 RBC ANTIBODY SCREEN: CPT

## 2024-03-18 PROCEDURE — 87340 HEPATITIS B SURFACE AG IA: CPT

## 2024-03-18 PROCEDURE — 87086 URINE CULTURE/COLONY COUNT: CPT

## 2024-03-19 ENCOUNTER — INITIAL PRENATAL (OUTPATIENT)
Dept: OBGYN CLINIC | Facility: MEDICAL CENTER | Age: 24
End: 2024-03-19

## 2024-03-19 VITALS — WEIGHT: 166.4 LBS | SYSTOLIC BLOOD PRESSURE: 102 MMHG | DIASTOLIC BLOOD PRESSURE: 60 MMHG | BODY MASS INDEX: 29.48 KG/M2

## 2024-03-19 DIAGNOSIS — F31.9 BIPOLAR DEPRESSION (HCC): ICD-10-CM

## 2024-03-19 DIAGNOSIS — Z3A.12 12 WEEKS GESTATION OF PREGNANCY: Primary | ICD-10-CM

## 2024-03-19 DIAGNOSIS — Z67.91 BLOOD TYPE, RH NEGATIVE: ICD-10-CM

## 2024-03-19 DIAGNOSIS — A60.04 HERPES SIMPLEX VULVOVAGINITIS: ICD-10-CM

## 2024-03-19 DIAGNOSIS — O99.341 DEPRESSION AFFECTING PREGNANCY IN FIRST TRIMESTER, ANTEPARTUM: ICD-10-CM

## 2024-03-19 DIAGNOSIS — F32.A DEPRESSION AFFECTING PREGNANCY IN FIRST TRIMESTER, ANTEPARTUM: ICD-10-CM

## 2024-03-19 LAB
ABO GROUP BLD: NORMAL
BACTERIA UR CULT: NORMAL
BACTERIA UR QL AUTO: ABNORMAL /HPF
BASOPHILS # BLD AUTO: 0.06 THOUSANDS/ÂΜL (ref 0–0.1)
BASOPHILS NFR BLD AUTO: 0 % (ref 0–1)
BILIRUB UR QL STRIP: NEGATIVE
BLD GP AB SCN SERPL QL: NEGATIVE
CLARITY UR: CLEAR
COLOR UR: YELLOW
EOSINOPHIL # BLD AUTO: 0.1 THOUSAND/ÂΜL (ref 0–0.61)
EOSINOPHIL NFR BLD AUTO: 1 % (ref 0–6)
ERYTHROCYTE [DISTWIDTH] IN BLOOD BY AUTOMATED COUNT: 14.6 % (ref 11.6–15.1)
GLUCOSE UR STRIP-MCNC: NEGATIVE MG/DL
HBV SURFACE AB SER-ACNC: <3 MIU/ML
HBV SURFACE AG SER QL: NORMAL
HCT VFR BLD AUTO: 36.2 % (ref 34.8–46.1)
HCV AB SER QL: NORMAL
HGB BLD-MCNC: 11.4 G/DL (ref 11.5–15.4)
HGB UR QL STRIP.AUTO: NEGATIVE
HIV 1+2 AB+HIV1 P24 AG SERPL QL IA: NORMAL
HIV 2 AB SERPL QL IA: NORMAL
HIV1 AB SERPL QL IA: NORMAL
HIV1 P24 AG SERPL QL IA: NORMAL
IMM GRANULOCYTES # BLD AUTO: 0.04 THOUSAND/UL (ref 0–0.2)
IMM GRANULOCYTES NFR BLD AUTO: 0 % (ref 0–2)
KETONES UR STRIP-MCNC: ABNORMAL MG/DL
LEUKOCYTE ESTERASE UR QL STRIP: ABNORMAL
LYMPHOCYTES # BLD AUTO: 4.3 THOUSANDS/ÂΜL (ref 0.6–4.47)
LYMPHOCYTES NFR BLD AUTO: 28 % (ref 14–44)
MCH RBC QN AUTO: 30 PG (ref 26.8–34.3)
MCHC RBC AUTO-ENTMCNC: 31.5 G/DL (ref 31.4–37.4)
MCV RBC AUTO: 95 FL (ref 82–98)
MONOCYTES # BLD AUTO: 1.15 THOUSAND/ÂΜL (ref 0.17–1.22)
MONOCYTES NFR BLD AUTO: 7 % (ref 4–12)
MUCOUS THREADS UR QL AUTO: ABNORMAL
NEUTROPHILS # BLD AUTO: 9.9 THOUSANDS/ÂΜL (ref 1.85–7.62)
NEUTS SEG NFR BLD AUTO: 64 % (ref 43–75)
NITRITE UR QL STRIP: NEGATIVE
NON-SQ EPI CELLS URNS QL MICRO: ABNORMAL /HPF
NRBC BLD AUTO-RTO: 0 /100 WBCS
PH UR STRIP.AUTO: 6.5 [PH]
PLATELET # BLD AUTO: 534 THOUSANDS/UL (ref 149–390)
PMV BLD AUTO: 10.7 FL (ref 8.9–12.7)
PROT UR STRIP-MCNC: ABNORMAL MG/DL
RBC # BLD AUTO: 3.8 MILLION/UL (ref 3.81–5.12)
RBC #/AREA URNS AUTO: ABNORMAL /HPF
RH BLD: NEGATIVE
RUBV IGG SERPL IA-ACNC: 61.4 IU/ML
SP GR UR STRIP.AUTO: 1.02 (ref 1–1.03)
SPECIMEN EXPIRATION DATE: NORMAL
TREPONEMA PALLIDUM IGG+IGM AB [PRESENCE] IN SERUM OR PLASMA BY IMMUNOASSAY: NORMAL
UROBILINOGEN UR STRIP-ACNC: <2 MG/DL
WBC # BLD AUTO: 15.55 THOUSAND/UL (ref 4.31–10.16)
WBC #/AREA URNS AUTO: ABNORMAL /HPF

## 2024-03-19 PROCEDURE — PNV: Performed by: OBSTETRICS & GYNECOLOGY

## 2024-03-19 PROCEDURE — 87591 N.GONORRHOEAE DNA AMP PROB: CPT | Performed by: OBSTETRICS & GYNECOLOGY

## 2024-03-19 PROCEDURE — 87491 CHLMYD TRACH DNA AMP PROBE: CPT | Performed by: OBSTETRICS & GYNECOLOGY

## 2024-03-19 RX ORDER — HYDROXYZINE 50 MG/1
TABLET, FILM COATED ORAL
COMMUNITY

## 2024-03-19 NOTE — ASSESSMENT & PLAN NOTE
Chronic for more then 1 year  No outbreaks reported today   needs Valtrex at 35 weeks  for suppression

## 2024-03-19 NOTE — PROGRESS NOTES
Initial Prenatal Visit  [facility] [address], [place]    Assessment/Plan:  Mary Jane is a 24 y.o. year old  at 12w1d who presents for initial prenatal visit.     Supervision of normal pregnancy  - Prenatal labs reviewed and normal.  Blood type: o negative   - Aneuploidy screening discussed.  Patient opts for cell free DNA testing. - MFM apt 3/21/24  - Routine cervical cancer screening: Pap Up to date.- 23-neg     - Routine STI Screening: GC/Chlamydia sent today.    ordered in prenatal panel. -   - Patient Education: Patient was counseled regarding diet, exercise, weight gain, foods to avoid, vaccines in pregnancy, aneuploidy screening, travel precautions to include seat belt use and VTE risk reduction.  She has been provided our pregnancy packet which includes how and when to contact providers, medication recommendations, dietary suggestions, breastfeeding information as well as websites for additional information, hospital and delivery concerns.       Additional Pregnancy Problems:   1. 12 weeks gestation of pregnancy  Assessment & Plan:  Dated at 6w5d by [facility]    Labs are reviewed   CBC slight anemic   Infecous labs HIV, Hep b c and rpr are normal  -  Rubella immune     Rh negative    Has an apt with MFM on 3/21      Orders:  -     Chlamydia/GC amplified DNA by PCR    2. Bipolar depression (HCC)  Assessment & Plan:  On Seroquel pt stopped the medication   Previous conversation the patient stopped all medication   We spoke of the medication and she reports today - that she is not taking them   She was given Lutuda which can take in pregnancy but she decided not to at this time  Partner in room and we spoke she is aware of her moods and will watch if she needs something    He reports that she is not vomiting  as much and feels better.       3. Blood type, Rh negative  Assessment & Plan:  Rhogam for bleeding   28 week and PP if needed   Reports no bleeding  today       4. Depression affecting pregnancy in first trimester, antepartum  Assessment & Plan:  Chronic and stable on no meds  We spoke today of warning signs that she needs support or help         5. Herpes simplex vulvovaginitis  Assessment & Plan:  Chronic for more then 1 year  No outbreaks reported today   needs Valtrex at 35 weeks  for suppression             Subjective:   CC:  Desires prenatal care  Mary Jane Reyes is a 24 y.o.  female who presents for prenatal care.  Pregnancy ROS: no leakage of fluid, pelvic pain, or vaginal bleeding.  yes nausea/vomiting.    The following portions of the patient's history were reviewed and updated as appropriate: allergies, current medications, past family history, past medical history, obstetric history, gynecologic history, past social history, past surgical history and problem list.      Objective:  /60   Wt 75.5 kg (166 lb 6.4 oz)   LMP 2023 (Exact Date)   BMI 29.48 kg/m²   Pregravid Weight/BMI: 73.5 kg (162 lb) (BMI 28.70)  Current Weight: 75.5 kg (166 lb 6.4 oz)   Total Weight Gain: 1.996 kg (4 lb 6.4 oz)   Pre-Beny Vitals      Flowsheet Row Most Recent Value   Prenatal Assessment    Fetal Heart Rate 145   Movement Absent   Prenatal Vitals    Blood Pressure 102/60   Weight - Scale 75.5 kg (166 lb 6.4 oz)   Urine Albumin/Glucose    Dilation/Effacement/Station    Vaginal Drainage    Draining Fluid No   Edema    LLE Edema None   RLE Edema None   Facial Edema None           General: Well appearing, no distress  Respiratory: Normal respiratory rate, lungs clear to auscultation, no wheezing or rales  Cardiovascular: Regular rate and rhythm, no murmurs, rubs, or gallops  Breasts: Normal bilaterally, nontender without masses, asymmetry, or nipple discharge.  Abdomen: Soft, gravid, nontender  : Urethra normal. Normal labia majora and minora. Vagina normal.  No vaginal bleeding. No vaginal discharge. Cervix visually closed.   Extremities: Warm and  well perfused.  Non tender.  No edema.    .

## 2024-03-19 NOTE — ASSESSMENT & PLAN NOTE
Dated at 6w5d by [facility]    Labs are reviewed   CBC slight anemic   Infecous labs HIV, Hep b c and rpr are normal  -  Rubella immune     Rh negative    Has an apt with MFM on 3/21

## 2024-03-20 LAB
C TRACH DNA SPEC QL NAA+PROBE: NEGATIVE
N GONORRHOEA DNA SPEC QL NAA+PROBE: NEGATIVE

## 2024-03-21 ENCOUNTER — ROUTINE PRENATAL (OUTPATIENT)
Age: 24
End: 2024-03-21
Payer: COMMERCIAL

## 2024-03-21 VITALS
SYSTOLIC BLOOD PRESSURE: 122 MMHG | HEART RATE: 67 BPM | WEIGHT: 167 LBS | BODY MASS INDEX: 29.59 KG/M2 | HEIGHT: 63 IN | DIASTOLIC BLOOD PRESSURE: 62 MMHG

## 2024-03-21 DIAGNOSIS — O99.341 DEPRESSION AFFECTING PREGNANCY IN FIRST TRIMESTER, ANTEPARTUM: Primary | ICD-10-CM

## 2024-03-21 DIAGNOSIS — Z34.01 ENCOUNTER FOR SUPERVISION OF NORMAL FIRST PREGNANCY IN FIRST TRIMESTER: ICD-10-CM

## 2024-03-21 DIAGNOSIS — Z36.82 NUCHAL TRANSLUCENCY OF FETUS ON PRENATAL ULTRASOUND: ICD-10-CM

## 2024-03-21 DIAGNOSIS — F32.A DEPRESSION AFFECTING PREGNANCY IN FIRST TRIMESTER, ANTEPARTUM: Primary | ICD-10-CM

## 2024-03-21 DIAGNOSIS — Z3A.12 12 WEEKS GESTATION OF PREGNANCY: ICD-10-CM

## 2024-03-21 PROCEDURE — 99243 OFF/OP CNSLTJ NEW/EST LOW 30: CPT | Performed by: OBSTETRICS & GYNECOLOGY

## 2024-03-21 PROCEDURE — 76813 OB US NUCHAL MEAS 1 GEST: CPT | Performed by: OBSTETRICS & GYNECOLOGY

## 2024-03-21 RX ORDER — SERTRALINE HYDROCHLORIDE 25 MG/1
25 TABLET, FILM COATED ORAL DAILY
COMMUNITY
Start: 2024-02-01 | End: 2024-03-21

## 2024-03-21 NOTE — LETTER
2024     Anson Conti MD  [address] [place] [id number]    Patient: Mary Jane Reyes   YOB: 2000   Date of Visit: 3/21/2024       Dear Dr. Conti:    Thank you for referring Mary Jane Reyes to me for evaluation. Below are my notes for this consultation.    If you have questions, please do not hesitate to call me. I look forward to following your patient along with you.         Sincerely,        Jazzy Andrew MD        CC: No Recipients    Jazzy Andrew MD  3/22/2024  3:15 AM  Sign when Signing Visit  OFFICE CONSULT  Referring physician:   Anson Conti Md  [address] [place] [address]      Dear Dr. Conti      Thank you for requesting a  consultation on your patient Ms. Mary Jane Reyes for the following indications:  Genetic screening    History  Medications: Prenatal vitamins and Atarax as needed and Zofran as needed.  Prior to pregnancy she was also on Latuda and Seroquel and Topamax and Requip( ropinirole) and Effexor. She stopped these meds due to pregnancy.  Allergies to medications: She has allergies to Bactrim latex and nickel that cause itching and amoxicillin and penicillin that caused diarrhea  Past medical history: Maternal depression and anxiety currently not controlled and she would like to resume Latuda, history of HSV genitalia  Past surgical history: Inguinal cyst removal and wisdom tooth removal  Past obstetrical history:  3 para 0-0-2-0 with a history of an 8-week 2-day  on 2018 and a 6-week miscarriage on 2023  Social history: Reports no history of substance use  First generation family history: Family history of bipolar depression in her mother and HIV and blood cancer in her father    Ultrasound findings: The ultrasound shows a fetus concordant with dates. The nasal bone and nuchal translucency appears normal. No malformations are seen on today's early ultrasound.     The  patient was informed of the findings and counseled about the limitations of the exam in detecting all forms of fetal congenital abnormalities.    She does not report any vaginal bleeding or uterine cramping or contractions.      Specific counseling was provided on the following problems:  We discussed the options for genetic screening which include invasive testing on the fetal placenta or on fetal skin cells within the amniotic fluid and compared this to noninvasive testing which includes cell free DNA screening.  We reviewed the risks, the benefits and the limitations of each.  In the end patient chose to complete the cell free DNA screen and was given the kit to take to the lab by her request.       Future tests recommended:  The results of her NIPT will return in 7-10 days.  Screening for spina bifida with an MSAFP screen is a future test that can be prescribed through her OB office.  This blood work should be drawn preferably at 16 to 18 weeks so that the results return prior to her next scan.  The test though can be run until 21 weeks and 6 days if needed.  We reviewed the mother to baby website for her medications Latuda which she would like to start and Zoloft which has more research known so she was encouraged to start instead of Latuda but which Mary Jane states does not work for her. Lastly I gave her a copy of the write up on Atarax in [employer]  In review of these medications, the studies do not show any specific risk for malformations in any of the meds. More studies were done on Zoloft compared to Latuda.  Malformations occur during the first trimester.  If patient feels she needs to be on Latuda and can delay starting till after 14 weeks then we can reassure her that any malformations found at her 20-week scan developed secondary to the background risk for malformations and not because she was on Latuda.  Lastly we also discussed that many of these medications after delivery may be associated with a  type of withdrawal symptoms that may develop in her  temporarily.    Future ultrasounds ordered today:   Fetal Level II ultrasound imaging is recommended at 19-20 weeks' gestation.    Pre visit time reviewing her records   10 minutes  Face to face time 15 minutes  Post visit time on documentation of note, updating her problem list, adding orders and prescriptions 20 minutes.  Procedures that were completed today were charged separately.   The level of decision making was low level complexity.    Jazzy Andrew MD

## 2024-03-21 NOTE — PROGRESS NOTES
OFFICE CONSULT  Referring physician:   Anson Conti Md  [address] [place] [address]  [address] [place] [address]      Dear Dr. Conti      Thank you for requesting a  consultation on your patient Ms. Mary Jane Reyes for the following indications:  Genetic screening    History  Medications: Prenatal vitamins and Atarax as needed and Zofran as needed.  Prior to pregnancy she was also on Latuda and Seroquel and Topamax and Requip( ropinirole) and Effexor. She stopped these meds due to pregnancy.  Allergies to medications: She has allergies to Bactrim latex and nickel that cause itching and amoxicillin and penicillin that caused diarrhea  Past medical history: Maternal depression and anxiety currently not controlled and she would like to resume Latuda, history of HSV genitalia  Past surgical history: Inguinal cyst removal and wisdom tooth removal  Past obstetrical history:  3 para 0-0-2-0 with a history of an 8-week 2-day  on 2018 and a 6-week miscarriage on 2023  Social history: Reports no history of substance use  First generation family history: Family history of bipolar depression in her mother and HIV and blood cancer in her father    Ultrasound findings: The ultrasound shows a fetus concordant with dates. The nasal bone and nuchal translucency appears normal. No malformations are seen on today's early ultrasound.     The patient was informed of the findings and counseled about the limitations of the exam in detecting all forms of fetal congenital abnormalities.    She does not report any vaginal bleeding or uterine cramping or contractions.      Specific counseling was provided on the following problems:  We discussed the options for genetic screening which include invasive testing on the fetal placenta or on fetal skin cells within the amniotic fluid and compared this to noninvasive testing which includes cell free DNA screening.  We reviewed the risks, the benefits and the  limitations of each.  In the end patient chose to complete the cell free DNA screen and was given the kit to take to the lab by her request.       Future tests recommended:  The results of her NIPT will return in 7-10 days.  Screening for spina bifida with an MSAFP screen is a future test that can be prescribed through her OB office.  This blood work should be drawn preferably at 16 to 18 weeks so that the results return prior to her next scan.  The test though can be run until 21 weeks and 6 days if needed.  We reviewed the mother to baby website for her medications Latuda which she would like to start and Zoloft which has more research known so she was encouraged to start instead of Latuda but which Mary Jane states does not work for her. Lastly I gave her a copy of the write up on Atarax in [employer].  In review of these medications, the studies do not show any specific risk for malformations in any of the meds. More studies were done on Zoloft compared to Latuda.  Malformations occur during the first trimester.  If patient feels she needs to be on Latuda and can delay starting till after 14 weeks then we can reassure her that any malformations found at her 20-week scan developed secondary to the background risk for malformations and not because she was on Latuda.  Lastly we also discussed that many of these medications after delivery may be associated with a type of withdrawal symptoms that may develop in her  temporarily.    Future ultrasounds ordered today:   Fetal Level II ultrasound imaging is recommended at 19-20 weeks' gestation.    Pre visit time reviewing her records   10 minutes  Face to face time 15 minutes  Post visit time on documentation of note, updating her problem list, adding orders and prescriptions 20 minutes.  Procedures that were completed today were charged separately.   The level of decision making was low level complexity.    Jazzy Andrew MD

## 2024-03-21 NOTE — PROGRESS NOTES
Patient chose to have [facility] [id number] Non-Invasive Prenatal Screen [id number] PLUS w/ SCA, WITH fetal sex (per pt request).  Patient choose billed through insurance.     Patient given brochure and is aware [facility] will contact patient's insurance and coordinate coverage.  Provided [facility] contact information. General inquiries [phone], Cost estimates [phone] and [facility] Billing [phone]. Website [email or web address].     Blood collection tubes labeled with patient identifiers (name, medical record number, and date of birth).     Filled out [facility] order form. Patient chose to be given a test kit. Patient instructed to take to a [facility] lab for blood collection..   If patient had blood work in office: Blood drawn from sent to lab for blood work. . Needle used sent to lab for blood work. .     If patient chose to have blood work drawn at a [facility] lab we requested patient notify MFM (via phone call or Medical Envelope message) when blood collected so office can follow up on results.     Copy of lab order scanned to Epic media.     Maternal Fetal Medicine will have results in approximately 5-7 business days and will call patient or notify via Medical Envelope.  Patient aware viewing lab result online will reveal fetal sex if ordered.    Patient verbalized understanding of all instructions and no questions at this time.

## 2024-03-26 ENCOUNTER — NURSE TRIAGE (OUTPATIENT)
Age: 24
End: 2024-03-26

## 2024-03-26 DIAGNOSIS — O21.9 NAUSEA AND VOMITING OF PREGNANCY, ANTEPARTUM: ICD-10-CM

## 2024-03-26 PROBLEM — O21.0 HYPEREMESIS GRAVIDARUM: Status: ACTIVE | Noted: 2024-03-26

## 2024-03-26 PROBLEM — Z3A.13 13 WEEKS GESTATION OF PREGNANCY: Status: ACTIVE | Noted: 2024-03-26

## 2024-03-26 RX ORDER — ONDANSETRON 4 MG/1
4 TABLET, FILM COATED ORAL EVERY 8 HOURS PRN
Qty: 20 TABLET | Refills: 0 | Status: SHIPPED | OUTPATIENT
Start: 2024-03-26

## 2024-03-26 NOTE — TELEPHONE ENCOUNTER
"Patient is 13w1d, states she was in [facility] ER Friday, was given IV hydration but 3 hours later felt bad again.  Has not been able to keep food or liquids down since then. Patient is taking Zofran BID. She forces herself to eat and drink but immediately vomits. States she feels weak and dry. States B6 makes her feel worse and she only has 1 Zofran left. States she's had N/V throughout the pregnancy, but has severely worsened since Friday. Denies fevers, diarrhea, sick   contacts.   Tiger Text to on call provider. Patient will be set up with an appointment at the infusion center today. Office clinical nurse will set up and schedule appointment and notify patient.      Reason for Disposition   SEVERE vomiting (e.g., > 6 times / day) and present > 8 hours    Answer Assessment - Initial Assessment Questions  1. VOMITING SEVERITY: \"How many times have you vomited in the past 24 hours?\"      - MILD:  1 - 2 times/day     - MODERATE: 3 - 5 times/day, decreased oral intake without significant weight loss or symptoms of dehydration     - SEVERE: 6 or more times/day, vomits everything or nearly everything, with significant weight loss, symptoms of dehydration       Severe- vomits everything she eats and drinks since Friday  2. ONSET: \"When did the vomiting begin?\"       Friday night , hasn't kept food down -was seen in [facility] ER  3. FLUIDS: \"What fluids or food have you vomited up today?\" \"Are you able to keep any liquids down?\"      none  4. TREATMENT: \"What have you been doing so far to treat this?\"       Zofran BID, [employer] PRN HS, States B6 makes nausea worse  5. DEHYDRATION: \"When was the last time you urinated?\" \"Are you feeling lightheaded?\" \"Weight loss?\"      Feels weak, dry  6. PREGNANCY: \"How many weeks pregnant are you?\" \"How has the pregnancy been going?\"      13w1d  7. LARRY: \"What date are you expecting to deliver?\"      9/30/24  8. MEDICATIONS: \"What medications are you taking?\" (e.g., prenatal vitamins, iron)      See " chart    Protocols used: Pregnancy - Morning Sickness (Nausea and Vomiting of Pregnancy)-ADULT-[place]

## 2024-03-26 NOTE — TELEPHONE ENCOUNTER
Regarding: more than moring sickness  ----- Message from Gabriela Pina sent at 3/26/2024  8:34 AM EDT -----  Patient was supposed to be seen yesterday but was sick. She went ot the ed over the weekend for fluids for vomiting. She is 13 weeks and her vomiting is mor jimmy just morning sickness.

## 2024-03-27 ENCOUNTER — TELEPHONE (OUTPATIENT)
Age: 24
End: 2024-03-27

## 2024-03-27 NOTE — TELEPHONE ENCOUNTER
Returned call to patient. Patient reports last night had more discharge than usual. Patient states it has not happened since and that her underwear was completely dry throughout the night and this morning. Patient denies any fevers or any other symptoms at this time. Patient was advised to keep monitoring at home and call back if she experiences another episode. Patient verbalized understanding and will call the office with any concerns or questions.

## 2024-03-27 NOTE — TELEPHONE ENCOUNTER
Patient called the RX Refill Line. Message is being forwarded to the office.     Patient is requesting a call back last night she felt fluid discharge but does not feel that it was urine.  Patient is very shaken due to this happening and would like an urgent call back.    Please review and advise the patient     Please contact patient at  [phone]

## 2024-04-01 ENCOUNTER — NURSE TRIAGE (OUTPATIENT)
Age: 24
End: 2024-04-01

## 2024-04-01 NOTE — TELEPHONE ENCOUNTER
"Reason for Disposition   Symptoms of anxiety or panic attack and is a chronic symptom (recurrent or ongoing AND present > 4 weeks)     Appointment scheduled for 2024    Answer Assessment - Initial Assessment Questions  Mary Jane is requesting repeat u/s due to anxiety/concerns for pregnancy due to her h/o miscarriage/. Denies vaginal bleeding/pelvic pain. Had anatomy scan with MFM 3/21/2024-baby growing appropriately. She had stopped her anti-anxiety meds due to pregnancy and was provided with script for zoloft which she stopped due to the way if made her feel. Occassionaly takes hydroxyzine if needed.  Has good support system at home and with counselor-needs to schedule appt with counselor for f/u since discontinuing meds.  Denies SI/HI- advised to proceed to ED if thoughts of harming self or others occurs.  Continue to monitor and report vaginal bleeding/pelvic pain. Reassured risk of MAB decrease in 2nd trimester. Keep OB appt as scheduled  1. CONCERN: \"What happened that made you call today?\"      Having anxiety with pregnancy due to h/o  and SAB  2. ANXIETY SYMPTOM SCREENING: \"Can you describe how you have been feeling?\"  (e.g., tense, restless, panicky, anxious, keyed up, trouble sleeping, trouble concentrating)      Nervous, anxious  3. ONSET: \"How long have you been feeling this way?\"      chronic  4. RECURRENT: \"Have you felt this way before?\"  If Yes, ask: \"What happened that time?\" \"What helped these feelings go away in the past?\"       Yes- see therpaist for counseling.  Was on medication that was stopped when learned of pregnancy  5. RISK OF HARM - SUICIDAL IDEATION:  \"Do you ever have thoughts of hurting or killing yourself?\"  (e.g., yes, no, no but preoccupation with thoughts about death)    - INTENT:  \"Do you have thoughts of hurting or killing yourself right NOW?\" (e.g., yes, no, N/A)    - PLAN: \"Do you have a specific plan for how you would do this?\" (e.g., gun, knife, overdose, " "no plan, N/A)      denies  6. RISK OF HARM - HOMICIDAL IDEATION:  \"Do you ever have thoughts of hurting or killing someone else?\"  (e.g., yes, no, no but preoccupation with thoughts about death)    - INTENT:  \"Do you have thoughts of hurting or killing someone right NOW?\" (e.g., yes, no, N/A)    - PLAN: \"Do you have a specific plan for how you would do this?\" (e.g., gun, knife, no plan, N/A)       denies  8. SUPPORT: \"Who is with you now?\" \"Who do you live with?\" \"Do you have family or friends who you can talk to?\"       Good support system at home  9. THERAPIST: \"Do you have a counselor or therapist? Name?\"      Yes, needs to schedule appt Counselor has been away for 2 weeks  10. STRESSORS: \"Has there been any new stress or recent changes in your life?\"        Pregnancy    13. OTHER SYMPTOMS: \"Do you have any other physical symptoms right now?\" (e.g., chest pain, palpitations, difficulty breathing, fever)        None reported  14. PREGNANCY: \"Is there any chance you are pregnant?\" \"When was your last menstrual period?\"        Yes, 14w0d.    Protocols used: Anxiety and Panic Attack-ADULT-[place]    "

## 2024-04-02 ENCOUNTER — TELEPHONE (OUTPATIENT)
Dept: INFUSION CENTER | Facility: CLINIC | Age: 24
End: 2024-04-02

## 2024-04-02 NOTE — TELEPHONE ENCOUNTER
Second attempt made to contact patient.  Left detailed voice mail in regards to pt requesting an U/S.  Reviewed that her insurance would most likely not cover that and she would have to pay out of pock for an U/S.  Encouraged her to call off to schedule an appointment to be seen by a provider in regards to her anxiety and they would also be able to obtain fetal heart tones by a hand held doppler.

## 2024-04-02 NOTE — TELEPHONE ENCOUNTER
New patient phone call made and VM left to remind patient of appointment on 4/4 @ 1pm. Instructed patient of how long appointment time would be and to call the infusion center back if she has any questions.

## 2024-04-03 ENCOUNTER — ROUTINE PRENATAL (OUTPATIENT)
Dept: OBGYN CLINIC | Facility: CLINIC | Age: 24
End: 2024-04-03
Payer: COMMERCIAL

## 2024-04-03 VITALS — BODY MASS INDEX: 28.84 KG/M2 | DIASTOLIC BLOOD PRESSURE: 70 MMHG | WEIGHT: 162.8 LBS | SYSTOLIC BLOOD PRESSURE: 110 MMHG

## 2024-04-03 DIAGNOSIS — O99.340 ANXIETY DURING PREGNANCY: ICD-10-CM

## 2024-04-03 DIAGNOSIS — Z34.02 ENCOUNTER FOR SUPERVISION OF NORMAL FIRST PREGNANCY IN SECOND TRIMESTER: Primary | ICD-10-CM

## 2024-04-03 DIAGNOSIS — F41.9 ANXIETY DURING PREGNANCY: ICD-10-CM

## 2024-04-03 PROBLEM — Z3A.14 14 WEEKS GESTATION OF PREGNANCY: Status: ACTIVE | Noted: 2024-03-26

## 2024-04-03 PROCEDURE — G9919 SCRN ND POS ND PROV OF REC: HCPCS | Performed by: OBSTETRICS & GYNECOLOGY

## 2024-04-03 PROCEDURE — G9920 SCRNING PERF AND NEGATIVE: HCPCS | Performed by: OBSTETRICS & GYNECOLOGY

## 2024-04-03 PROCEDURE — 99213 OFFICE O/P EST LOW 20 MIN: CPT | Performed by: OBSTETRICS & GYNECOLOGY

## 2024-04-03 NOTE — PROGRESS NOTES
Routine Prenatal Visit  [facility] [address], [place], [place]    Assessment/Plan:  Mary Jane is a 24 y.o. year old  at 14w2d who presents for routine prenatal visit.     1. Encounter for supervision of normal first pregnancy in second trimester    2. Anxiety during pregnancy          Subjective:     CC: Prenatal care    Mary Jane Reyes is a 24 y.o.  female who presents for routine prenatal care at 14w2d.  Pregnancy ROS: no leakage of fluid, pelvic pain, or vaginal bleeding.  no fetal movement.    Patient extremely anxious about pregnancy. Worried she will have a miscarriage. Sees a therapist. Discussed following up as needed, reassured that miscarriage is less likely at this point in the pregnancy    The following portions of the patient's history were reviewed and updated as appropriate: allergies, current medications, past family history, past medical history, obstetric history, gynecologic history, past social history, past surgical history and problem list.      Objective:  /70   Wt 73.8 kg (162 lb 12.8 oz)   LMP 2023 (Exact Date)   BMI 28.84 kg/m²   Pregravid Weight/BMI: 73.5 kg (162 lb) (BMI 28.70)  Current Weight: 73.8 kg (162 lb 12.8 oz)   Total Weight Gain: 0.363 kg (12.8 oz)   Pre-Beny Vitals      Flowsheet Row Most Recent Value   Prenatal Assessment    Fetal Heart Rate 158   Prenatal Vitals    Blood Pressure 110/70   Weight - Scale 73.8 kg (162 lb 12.8 oz)   Urine Albumin/Glucose    Dilation/Effacement/Station    Vaginal Drainage    Draining Fluid No   Edema    LLE Edema None             General: Well appearing, no distress  Respiratory: Unlabored breathing  Cardiovascular: Regular rate.  Abdomen: Soft, gravid, nontender  Fundal Height: Appropriate for gestational age.  Extremities: Warm and well perfused.  Non tender.

## 2024-04-09 ENCOUNTER — TELEPHONE (OUTPATIENT)
Age: 24
End: 2024-04-09

## 2024-04-09 NOTE — TELEPHONE ENCOUNTER
Incoming call from patient states she is unable to schedule genetic blood work due to prior auth being incomplete. Advised will send to prior auth team to help assist patient. Patient verbalized understanding. No further questions at this time.

## 2024-04-12 ENCOUNTER — APPOINTMENT (OUTPATIENT)
Dept: LAB | Facility: HOSPITAL | Age: 24
End: 2024-04-12
Attending: OBSTETRICS & GYNECOLOGY
Payer: COMMERCIAL

## 2024-04-12 DIAGNOSIS — Z34.01 ENCOUNTER FOR SUPERVISION OF NORMAL FIRST PREGNANCY IN FIRST TRIMESTER: ICD-10-CM

## 2024-04-12 DIAGNOSIS — Z33.1 PREGNANT STATE, INCIDENTAL: ICD-10-CM

## 2024-04-12 DIAGNOSIS — Z36.0 SCREENING FOR CHROMOSOMAL ANOMALIES BY AMNIOCENTESIS: ICD-10-CM

## 2024-04-12 LAB
B-HCG SERPL-ACNC: ABNORMAL MIU/ML (ref 0–5)
ERYTHROCYTE [DISTWIDTH] IN BLOOD BY AUTOMATED COUNT: 15 % (ref 11.6–15.1)
EST. AVERAGE GLUCOSE BLD GHB EST-MCNC: 108 MG/DL
HBA1C MFR BLD: 5.4 %
HCT VFR BLD AUTO: 31.7 % (ref 34.8–46.1)
HGB BLD-MCNC: 10.6 G/DL (ref 11.5–15.4)
MCH RBC QN AUTO: 30.6 PG (ref 26.8–34.3)
MCHC RBC AUTO-ENTMCNC: 33.4 G/DL (ref 31.4–37.4)
MCV RBC AUTO: 92 FL (ref 82–98)
PLATELET # BLD AUTO: 456 THOUSANDS/UL (ref 149–390)
PMV BLD AUTO: 10.4 FL (ref 8.9–12.7)
RBC # BLD AUTO: 3.46 MILLION/UL (ref 3.81–5.12)
TSH SERPL DL<=0.05 MIU/L-ACNC: 0.63 UIU/ML (ref 0.45–4.5)
WBC # BLD AUTO: 17.77 THOUSAND/UL (ref 4.31–10.16)

## 2024-04-12 PROCEDURE — 36415 COLL VENOUS BLD VENIPUNCTURE: CPT

## 2024-04-15 ENCOUNTER — NURSE TRIAGE (OUTPATIENT)
Age: 24
End: 2024-04-15

## 2024-04-15 LAB — MISCELLANEOUS LAB TEST RESULT: NORMAL

## 2024-04-15 NOTE — TELEPHONE ENCOUNTER
"Pt reports frequent nausea/vomiting the first three months of her pregnancy. States symptoms improved about 2 weeks ago. Worsened again about 2 days ago. States that symptoms are not as severe as they were at the start of pregnancy but she is very nauseous and vomiting about 3-4x/day. States that she can tolerate small sips of water but has no appetite for food. She is urinating well throughout the day. Has zofran prescription but is not taking it. Recommended pt take the zofran, continue with small sips of liquids and try to eat small bland snacks. Pt aware to call back if symptoms worsen.     Reason for Disposition   Nausea or vomiting    Answer Assessment - Initial Assessment Questions  1. VOMITING SEVERITY: \"How many times have you vomited in the past 24 hours?\"      - MILD:  1 - 2 times/day     - MODERATE: 3 - 5 times/day, decreased oral intake without significant weight loss or symptoms of dehydration     - SEVERE: 6 or more times/day, vomits everything or nearly everything, with significant weight loss, symptoms of dehydration       Moderate  2. ONSET: \"When did the vomiting begin?\"       2 days ago  3. FLUIDS: \"What fluids or food have you vomited up today?\" \"Are you able to keep any liquids down?\"      Tolerates sips of water  4. TREATMENT: \"What have you been doing so far to treat this?\"       nothing  5. DEHYDRATION: \"When was the last time you urinated?\" \"Are you feeling lightheaded?\" \"Weight loss?\"      States she is urinating well throughout the day  6. PREGNANCY: \"How many weeks pregnant are you?\" \"How has the pregnancy been going?\"      16w0d  7. LARRY: \"What date are you expecting to deliver?\"      9/30/24  8. MEDICATIONS: \"What medications are you taking?\" (e.g., prenatal vitamins, iron)      prenatal  9. OTHER SYMPTOMS: \"Do you have any other symptoms?\"      denies    Protocols used: Pregnancy - Morning Sickness (Nausea and Vomiting of Pregnancy)-ADULT-[place]    "

## 2024-04-16 ENCOUNTER — TELEPHONE (OUTPATIENT)
Age: 24
End: 2024-04-16

## 2024-04-16 NOTE — TELEPHONE ENCOUNTER
Pt called in stating her mychart shows the NIPT has resulted. Informed her that there are no results at this time, test can take about 7-10 days. She will be notified once resulted.

## 2024-04-16 NOTE — TELEPHONE ENCOUNTER
Patient calling requesting information on gender as she's received a MyC notification that NIPs test was completed. RN advised likely a notification that outside lab received kit. There are no results outlined in the system. Usually sent back from outside lab and placed into system once fully resulted. Patient will get another MyC notification once all results are in. Pt agreeable to plan. No further questions.

## 2024-04-18 ENCOUNTER — TELEPHONE (OUTPATIENT)
Age: 24
End: 2024-04-18

## 2024-04-18 LAB
CFDNA.FET/CFDNA.TOTAL SFR FETUS: NORMAL %
CITATION REF LAB TEST: NORMAL
FET 13+18+21+X+Y ANEUP PLAS.CFDNA: NEGATIVE
FET CHR 21 TS PLAS.CFDNA QL: NEGATIVE
FET CHR 21 TS PLAS.CFDNA QL: NEGATIVE
FET MS X RISK WBC.DNA+CFDNA QL: NOT DETECTED
FET SEX PLAS.CFDNA DOSAGE CFDNA: NORMAL
FET TS 13 RISK PLAS.CFDNA QL: NEGATIVE
FET X + Y ANEUP RISK PLAS.CFDNA SEQ-IMP: NOT DETECTED
GA EST FROM CONCEPTION DATE: NORMAL D
GESTATIONAL AGE > 9:: YES
LAB DIRECTOR NAME PROVIDER: NORMAL
LAB DIRECTOR NAME PROVIDER: NORMAL
LABORATORY COMMENT REPORT: NORMAL
LIMITATIONS OF THE TEST: NORMAL
NEGATIVE PREDICTIVE VALUE: NORMAL
PERFORMANCE CHARACTERISTICS: NORMAL
POSITIVE PREDICTIVE VALUE: NORMAL
REF LAB TEST METHOD: NORMAL
SL AMB NOTE:: NORMAL
TEST PERFORMANCE INFO SPEC: NORMAL

## 2024-04-18 NOTE — RESULT ENCOUNTER NOTE
Ms. Mary Jane Eric   Your Cell free DNA screening returned as normal.  If you are interested in knowing what the baby's sex is, than you will need to open the lab result to see it.     Your obstetrician at your next OB visit should offer screening for spina bifida that can be completed between 16 and 18 weeks and utilizes the blood test called MSAFP. This lab is to see if your baby is at increased risk to have spinal defect.    Jazzy Andrew MD

## 2024-04-18 NOTE — TELEPHONE ENCOUNTER
Patient called inquiring about Maternit 21 results, stating she received a MyChart alert on 4/15/24 but no results are available. I called [facility] and spoke to Ambar and stated that results were resulted this AM and should appear in MyChart within 24 hours. She is also faxing results to central faxing.

## 2024-04-20 ENCOUNTER — HOSPITAL ENCOUNTER (OUTPATIENT)
Dept: ULTRASOUND IMAGING | Facility: HOSPITAL | Age: 24
Discharge: HOME | End: 2024-04-20
Attending: OBSTETRICS & GYNECOLOGY

## 2024-04-20 DIAGNOSIS — N93.9 ABNORMAL UTERINE BLEEDING (AUB): ICD-10-CM

## 2024-04-22 ENCOUNTER — NURSE TRIAGE (OUTPATIENT)
Age: 24
End: 2024-04-22

## 2024-04-22 LAB — SCAN RESULT: NORMAL

## 2024-04-22 NOTE — TELEPHONE ENCOUNTER
Called patient to f/u on triage call. Left message on vm to return call. If patient has not been able to keep anything down for 24 hours, needs to go to the ED for further evaluation.     Moira, please f/u with patient tomorrow. Thank you!

## 2024-04-22 NOTE — TELEPHONE ENCOUNTER
"Patient called office complaining of nausea, vomiting, diarrhea for 3 days. She has history of vomiting and nausea in early pregnancy. She has not eaten and can hardly keep fluids down. Small sips sometime. She has decreased urine out put, goes 4 times a day, dry mouth, dry lips, weight has fluctuated. 17 weeks pregnant. She has a bachache as well. She tried taking Zofran, has not had relief. Call disconnected before I was able to give care advice. Attempted to call patient back, no answer. Left voicemail to call back.  [place] Text to Dr. Bocanegra alerting her of patient.           Answer Assessment - Initial Assessment Questions  1. VOMITING SEVERITY: \"How many times have you vomited in the past 24 hours?\"      - MILD:  1 - 2 times/day     - MODERATE: 3 - 5 times/day, decreased oral intake without significant weight loss or symptoms of dehydration     - SEVERE: 6 or more times/day, vomits everything or nearly everything, with significant weight loss, symptoms of dehydration       Moderate   2. ONSET: \"When did the vomiting begin?\"       3 days ago   3. FLUIDS: \"What fluids or food have you vomited up today?\" \"Are you able to keep any liquids down?\"      Not keeping anything down   4. TREATMENT: \"What have you been doing so far to treat this?\"       Zofran   5. DEHYDRATION: \"When was the last time you urinated?\" \"Are you feeling lightheaded?\" \"Weight loss?\"      Urinating 4 times a day, weight is fluctuating, dry mouth, dry lips   6. PREGNANCY: \"How many weeks pregnant are you?\" \"How has the pregnancy been going?\"      17 weeks   7. LARRY: \"What date are you expecting to deliver?\"      9/30/24  8. MEDICATIONS: \"What medications are you taking?\" (e.g., prenatal vitamins, iron)      PNV   9. OTHER SYMPTOMS: \"Do you have any other symptoms?\"      Diarrhea, back ache    Protocols used: Pregnancy - Morning Sickness (Nausea and Vomiting of Pregnancy)-ADULT-[place]    "

## 2024-04-23 ENCOUNTER — TELEPHONE (OUTPATIENT)
Age: 24
End: 2024-04-23

## 2024-04-23 NOTE — TELEPHONE ENCOUNTER
Attempted to contact patient to f/u, unavailable.  Left detailed massage if unable to to tolerate fluids needs to be seen in ED for evaluation

## 2024-04-23 NOTE — TELEPHONE ENCOUNTER
Pt returning a call. Pt apprehensive about going to ED because she's gone numerous times in the past, and she feels fluids and IV antiemetics improve the nausea for a few hours, and then symptoms return. States has been able to keep water down, just unable to eat. Does not have an appetite. C/o nausea. No issues with urinating. Pt aware that provider recommending to go to ED for evaluation today. Will likely hold off as she has an Obgyn visit tomorrow. Will discuss obtaining PO antiemetics at time of visit. Pt does not want to take Zofran as it makes her very constipated, and she is also aware of risk in pregnancy. Hoping to get prescription for Reglan. RN advised pt chew on ice or freeze gatorade and chew on those as ice cubes. Pt informed she is aware of pedialyte popsicles, and will go out to get more of those. Pt asking if Ensure is OK to take, RN advised yes, however, right now with nausea may be too heavy on her belly and worsen nausea. Pt agrees. Pt confirms will follow up with provider tomorrow at appointment. No further questions at this time.

## 2024-04-24 ENCOUNTER — ROUTINE PRENATAL (OUTPATIENT)
Dept: OBGYN CLINIC | Facility: MEDICAL CENTER | Age: 24
End: 2024-04-24
Payer: COMMERCIAL

## 2024-04-24 VITALS — BODY MASS INDEX: 28.87 KG/M2 | WEIGHT: 163 LBS | DIASTOLIC BLOOD PRESSURE: 70 MMHG | SYSTOLIC BLOOD PRESSURE: 118 MMHG

## 2024-04-24 DIAGNOSIS — Z3A.17 17 WEEKS GESTATION OF PREGNANCY: ICD-10-CM

## 2024-04-24 DIAGNOSIS — O21.0 HYPEREMESIS GRAVIDARUM: Primary | ICD-10-CM

## 2024-04-24 PROCEDURE — 99213 OFFICE O/P EST LOW 20 MIN: CPT | Performed by: STUDENT IN AN ORGANIZED HEALTH CARE EDUCATION/TRAINING PROGRAM

## 2024-04-24 RX ORDER — METOCLOPRAMIDE 5 MG/1
5 TABLET ORAL 3 TIMES DAILY PRN
Qty: 30 TABLET | Refills: 3 | Status: SHIPPED | OUTPATIENT
Start: 2024-04-24 | End: 2025-04-24

## 2024-04-24 NOTE — PROGRESS NOTES
Routine Prenatal Visit  [facility] [address], [place], [place]    Assessment/Plan:  Mary Jane is a 24 y.o. year old  at 17w2d who presents for routine prenatal visit.     1. Hyperemesis gravidarum    2. 17 weeks gestation of pregnancy  -     metoclopramide (REGLAN) 5 mg tablet; Take 1 tablet (5 mg total) by mouth 3 (three) times a day as needed (nausea)  -     Alpha fetoprotein, maternal; Future          Subjective:     CC: Prenatal care    Mary Jane Reyes is a 24 y.o.  female who presents for routine prenatal care at 17w2d.  Pregnancy ROS: Denies leakage of fluid, pelvic pain, or vaginal bleeding.  Reports fetal movement.    The following portions of the patient's history were reviewed and updated as appropriate: allergies, current medications, past family history, past medical history, obstetric history, gynecologic history, past social history, past surgical history and problem list.      Objective:  /70   Wt 73.9 kg (163 lb)   LMP 2023 (Exact Date)   BMI 28.87 kg/m²   Pregravid Weight/BMI: 73.5 kg (162 lb) (BMI 28.70)  Current Weight: 73.9 kg (163 lb)   Total Weight Gain: 0.454 kg (1 lb)   Pre- Vitals      Flowsheet Row Most Recent Value   Prenatal Assessment    Fetal Heart Rate 158   Prenatal Vitals    Blood Pressure 118/70   Weight - Scale 73.9 kg (163 lb)   Urine Albumin/Glucose    Dilation/Effacement/Station    Vaginal Drainage    Edema              General: Well appearing, no distress  Respiratory: Unlabored breathing  Cardiovascular: Regular rate.  Abdomen: Soft, gravid, nontender  Fundal Height: Appropriate for gestational age.  Extremities: Warm and well perfused.  Non tender.

## 2024-04-29 PROBLEM — Z3A.12 12 WEEKS GESTATION OF PREGNANCY: Status: RESOLVED | Noted: 2024-02-20 | Resolved: 2024-04-29

## 2024-04-29 PROBLEM — O99.342 DEPRESSION AFFECTING PREGNANCY IN SECOND TRIMESTER, ANTEPARTUM: Status: ACTIVE | Noted: 2024-02-20

## 2024-04-29 PROBLEM — Z3A.18 18 WEEKS GESTATION OF PREGNANCY: Status: ACTIVE | Noted: 2024-03-26

## 2024-05-01 ENCOUNTER — ROUTINE PRENATAL (OUTPATIENT)
Dept: OBGYN CLINIC | Facility: MEDICAL CENTER | Age: 24
End: 2024-05-01
Payer: COMMERCIAL

## 2024-05-01 VITALS — WEIGHT: 165 LBS | DIASTOLIC BLOOD PRESSURE: 72 MMHG | SYSTOLIC BLOOD PRESSURE: 104 MMHG | BODY MASS INDEX: 29.23 KG/M2

## 2024-05-01 DIAGNOSIS — F41.9 ANXIETY DURING PREGNANCY: ICD-10-CM

## 2024-05-01 DIAGNOSIS — O99.340 ANXIETY DURING PREGNANCY: ICD-10-CM

## 2024-05-01 DIAGNOSIS — Z3A.18 18 WEEKS GESTATION OF PREGNANCY: ICD-10-CM

## 2024-05-01 DIAGNOSIS — F32.A DEPRESSION AFFECTING PREGNANCY IN SECOND TRIMESTER, ANTEPARTUM: Primary | ICD-10-CM

## 2024-05-01 DIAGNOSIS — O21.0 HYPEREMESIS GRAVIDARUM: ICD-10-CM

## 2024-05-01 DIAGNOSIS — O99.342 DEPRESSION AFFECTING PREGNANCY IN SECOND TRIMESTER, ANTEPARTUM: Primary | ICD-10-CM

## 2024-05-01 PROCEDURE — 99213 OFFICE O/P EST LOW 20 MIN: CPT | Performed by: NURSE PRACTITIONER

## 2024-05-01 NOTE — PROGRESS NOTES
Denies loss of fluid, vaginal bleeding and abdominal pain.  Confirms fetal movement, flutters.  Tolerating prenatal vitamin well.  Is using hydroxyzine approximately 4 times per week.  Has not used Zofran.  Took Reglan once since prescribed.  Patient continues to have intermittent diarrhea can occur 2 to 3 days per week and have 2-3 episodes of diarrhea per day.  Uncertain if this is related to any food intake.  Nausea and vomiting has significantly improved.  Has not completed AFP.  Denies other questions or concerns at today's visit  BP: 104/72 weight: +3lbs  Plan  - continue prenatal vitamins daily.  -Intermittent diarrhea patient encouraged to check prenatal vitamin to make sure there is not a stool softener included.  If stool softeners included recommend patient switching prenatal vitamins.  Encouraged to keep food journal to see if there is any dietary intake causing episodes.  Signs and symptoms to report reviewed  -Follow-up with  [facility]  -Reviewed MSAFP patient has ordered.  Reviewed time sensitive should be drawn prior to 20 gestational weeks  -Anxiety in pregnancy continue hydroxyzine as needed  -Signs and symptoms to report reviewed.  Common discomforts of pregnancy and precautions reviewed  RTO 4 weeks     
Clear

## 2024-05-02 ENCOUNTER — NURSE TRIAGE (OUTPATIENT)
Age: 24
End: 2024-05-02

## 2024-05-02 NOTE — TELEPHONE ENCOUNTER
"Patient  18w3d called c/o round ligament pain, right side, shooting from belly button to vagina, intermittent. Unsure if feeling mild fetal movement due to gestation. Denies LOF, VB. Advised patient to avoid sudden position changes. Try heat by appylying a moist towel or taking a warm bath or shower. May take Tylenol up to 3000 mg daily for discomfort. Advised may use pregnancy belt during the day. Advised to stretch. Call back if pain persists, worsens, unable to walk, VB, LOF.       Reason for Disposition   Round ligament pain (previously diagnosed by physician), questions about    Answer Assessment - Initial Assessment Questions  1. LOCATION: \"Where does it hurt?\"       Right side belly button   2. RADIATION: \"Does the pain shoot anywhere else?\" (e.g., chest, back, shoulder)      Shoot to vagina  3. ONSET: \"When did the pain begin?\" (e.g., minutes, hours or days ago)       This week  4. ONSET: \"Gradual or sudden onset?\"      gradual  5. PATTERN \"Does the pain come and go, or has it been constant since it started?\"       Intermittent   6. SEVERITY: \"How bad is the pain?\" \"What does it keep you from doing?\"  (e.g., Scale 1-10; mild, moderate, or severe)    - MILD (1-3): doesn't interfere with normal activities, abdomen soft and not tender to touch     - MODERATE (4-7): interferes with normal activities or awakens from sleep, tender to touch     - SEVERE (8-10): excruciating pain, doubled over, unable to do any normal activities      6  7. RECURRENT SYMPTOM: \"Have you ever had this type of stomach pain before?\" If Yes, ask: \"When was the last time?\" and \"What happened that time?\"       denies  8. CAUSE: \"What do you think is causing the stomach pain?\"      denies  9. RELIEVING/AGGRAVATING FACTORS: \"What makes it better or worse?\" (e.g., antacids, bowel movement, movement)      Changing positions  10. OTHER SYMPTOMS: \"Has there been any vaginal bleeding, fever, vomiting, diarrhea, or urine problems?\"        " "denies  11. LARRY: \"What date are you expecting to deliver?\"        18w3d    Protocols used: Pregnancy - Abdominal Pain Less Than 20 Weeks EGA-ADULT-[place]    "

## 2024-05-02 NOTE — TELEPHONE ENCOUNTER
Regarding: Round Ligament Pain  ----- Message from Lakeshia Looney sent at 5/2/2024  8:57 AM EDT -----  Pt was seen yesterday by OB. She states she spoke with OB about this pain and originally the pain would come and go but now it is steady and worse it is not going away. Pt is very concerned about this because it is not going away. No CTS available.

## 2024-05-07 ENCOUNTER — NURSE TRIAGE (OUTPATIENT)
Age: 24
End: 2024-05-07

## 2024-05-07 NOTE — TELEPHONE ENCOUNTER
"Pt reports diarrhea that happened one week ago, went away but started again yesterday. Reports about 7-8 bouts of diarrhea in the last 24 hours. States bowel movements are extremely painful at this time and she noticed bright red blood from her rectum when she is wiping. States that she is nauseous, intermittent dizziness and has a frontal HA. States she is urinating frequently but not drinking much.   Denies abdominal pain or vaginal bleeding.    TT sent to Dr. Urban for further advice. Can try Imodium to Dec frequency of BMs, Tylenol for HA. Would recommend f/u with PCP to assess for etiology. If remains dizzy despite Dec frequency with meds (or getting worse), should be evaluated in ER.    Information reviewed with pt and she verbalized understanding.  Reason for Disposition   SEVERE diarrhea (e.g., 7 or more times / day more than normal)    Answer Assessment - Initial Assessment Questions  1. DIARRHEA SEVERITY: \"How bad is the diarrhea?\" \"How many extra stools have you had in the past 24 hours than normal?\"     - NO DIARRHEA (SCALE 0)    - MILD (SCALE 1-3): Few loose or mushy BMs; increase of 1-3 stools over normal daily number of stools; mild increase in ostomy output.    -  MODERATE (SCALE 4-7): Increase of 4-6 stools daily over normal; moderate increase in ostomy output.  * SEVERE (SCALE 8-10; OR 'WORST POSSIBLE'): Increase of 7 or more stools daily over normal; moderate increase in ostomy output; incontinence.      severe  2. ONSET: \"When did the diarrhea begin?\"       yesterday  3. BM CONSISTENCY: \"How loose or watery is the diarrhea?\"       watery  4. VOMITING: \"Are you also vomiting?\" If Yes, ask: \"How many times in the past 24 hours?\"       Denies but is nauseous  5. ABDOMINAL PAIN: \"Are you having any abdominal pain?\" If Yes, ask: \"What does it feel like?\" (e.g., crampy, dull, intermittent, constant)       denies  6. ABDOMINAL PAIN SEVERITY: If present, ask: \"How bad is the pain?\"  (e.g., Scale 1-10; " "mild, moderate, or severe)    - MILD (1-3): doesn't interfere with normal activities, abdomen soft and not tender to touch     - MODERATE (4-7): interferes with normal activities or awakens from sleep, tender to touch     - SEVERE (8-10): excruciating pain, doubled over, unable to do any normal activities        denies  7. ORAL INTAKE: If vomiting, \"Have you been able to drink liquids?\" \"How much fluids have you had in the past 24 hours?\"      Has not been drinking much water  8. HYDRATION: \"Any signs of dehydration?\" (e.g., dry mouth [not just dry lips], too weak to stand, dizziness, new weight loss) \"When did you last urinate?\"      States she is urinating frequently but does have mild intermittent dizziness  9. EXPOSURE: \"Have you traveled to a foreign country recently?\" \"Have you been exposed to anyone with diarrhea?\" \"Could you have eaten any food that was spoiled?\"      denies  10. ANTIBIOTIC USE: \"Are you taking antibiotics now or have you taken antibiotics in the past 2 months?\"        denies  11. OTHER SYMPTOMS: \"Do you have any other symptoms?\" (e.g., fever, blood in stool)        Blood when wiping  12. PREGNANCY: \"Is there any chance you are pregnant?\" \"When was your last menstrual period?\"        19w1d    Protocols used: Diarrhea-ADULT-[place]    "

## 2024-05-07 NOTE — TELEPHONE ENCOUNTER
Recs made for symptomatic tx with imodium and eval with PCP. If symptoms progressing or dizziness worsens/fails to improve, should be evaluated in ER.

## 2024-05-14 ENCOUNTER — TELEPHONE (OUTPATIENT)
Age: 24
End: 2024-05-14

## 2024-05-14 NOTE — TELEPHONE ENCOUNTER
Patient calling to inform that diarreah is getting worse. States every time she eats she feels that she has to go to the bathroom and has an episode. RN reviewed Dr. Urban' recommendations from 5/7/24 call. Pt states has not tried Imodium yet. RN advised to  Imodium and see if it helps symptoms, as well as schedule a follow up visit with PCP as advised by Dr. Urban. Advised to hydrate and rest. Informed that if symptoms worsen, or she feels lightheaded - should be evaluated in ED. Pt verbalized an understanding. No further questions.

## 2024-05-16 ENCOUNTER — ROUTINE PRENATAL (OUTPATIENT)
Age: 24
End: 2024-05-16
Payer: COMMERCIAL

## 2024-05-16 VITALS
HEIGHT: 63 IN | HEART RATE: 65 BPM | WEIGHT: 165.8 LBS | DIASTOLIC BLOOD PRESSURE: 64 MMHG | BODY MASS INDEX: 29.38 KG/M2 | SYSTOLIC BLOOD PRESSURE: 104 MMHG

## 2024-05-16 DIAGNOSIS — Z36.3 ENCOUNTER FOR ANTENATAL SCREENING FOR MALFORMATION: ICD-10-CM

## 2024-05-16 DIAGNOSIS — Z36.86 ENCOUNTER FOR ANTENATAL SCREENING FOR CERVICAL LENGTH: ICD-10-CM

## 2024-05-16 DIAGNOSIS — Z3A.20 20 WEEKS GESTATION OF PREGNANCY: Primary | ICD-10-CM

## 2024-05-16 PROCEDURE — 76805 OB US >/= 14 WKS SNGL FETUS: CPT | Performed by: OBSTETRICS & GYNECOLOGY

## 2024-05-16 PROCEDURE — 76817 TRANSVAGINAL US OBSTETRIC: CPT | Performed by: OBSTETRICS & GYNECOLOGY

## 2024-05-16 PROCEDURE — 99213 OFFICE O/P EST LOW 20 MIN: CPT | Performed by: OBSTETRICS & GYNECOLOGY

## 2024-05-16 NOTE — LETTER
May 16, 2024     Anson Conti MD  [address] [place] [address]  [address] [place] [id number]    Patient: Mary Jane Reyes   YOB: 2000   Date of Visit: 5/16/2024       Dear Dr. Conti:    Thank you for referring Mary Jane Reyes to me for evaluation. Below are my notes for this consultation.    If you have questions, please do not hesitate to call me. I look forward to following your patient along with you.         Sincerely,        Jazzy Andrew MD        CC: No Recipients    Jazzy Andrew MD  5/16/2024  1:03 PM  Sign when Signing Visit  Mary Jane Reyes is 20w3d. She reports fetal movements and does not report any vaginal bleeding or signs of labor.  Her recently completed fetal testing revealed a normal NIPT.  She is not a carrier for cystic fibrosis or SMA.  She did not complete her MSAFP by today's visit. She is here today for an ultrasound for fetal anatomy.    Problem list:  Maternal and depression previously on Latuda, Seroquel and Topamax and Requip and Effexor which she stopped due to pregnancy.  Previously she reported she would like to resume the Latuda and uses Atarax as needed. Today she states she is doing well and does not feel she needs to start Latuda.   Intermittent nausea and vomiting associated with symptoms of reflux    Ultrasound findings:  The ultrasound today shows normal interval fetal growth and fluid, normal cervical length, and no malformations were detected.  Anatomical survey today was limited secondary to fetal position.    Pregnancy ultrasound has limitations and is unable to detect all forms of fetal congenital abnormalities.      Specific counseling was provided on the following problems:  Recommend she try Pepcid 10 mg tablets over-the-counter that she can take 30 minutes before breakfast and 30 minutes before dinner to see if treating her reflux resolves her intermittent nausea and vomiting.    Follow up recommended:   Recommend a follow-up ultrasound in 8  weeks for growth and missed anatomy.    Pre visit time reviewing her records   5 minutes  Face to face time 10 minutes  Post visit time on documentation of note, updating her problem list, adding orders and prescriptions 5 minutes.  Procedures that were completed today were charged separately.   The level of decision making was low level complexity.    Jazzy Andrew MD

## 2024-05-16 NOTE — PROGRESS NOTES
Mary Jane Reyes is 20w3d. She reports fetal movements and does not report any vaginal bleeding or signs of labor.  Her recently completed fetal testing revealed a normal NIPT.  She is not a carrier for cystic fibrosis or SMA.  She did not complete her MSAFP by today's visit. She is here today for an ultrasound for fetal anatomy.    Problem list:  Maternal and depression previously on Latuda, Seroquel and Topamax and Requip and Effexor which she stopped due to pregnancy.  Previously she reported she would like to resume the Latuda and uses Atarax as needed. Today she states she is doing well and does not feel she needs to start Latuda.   Intermittent nausea and vomiting associated with symptoms of reflux    Ultrasound findings:  The ultrasound today shows normal interval fetal growth and fluid, normal cervical length, and no malformations were detected.  Anatomical survey today was limited secondary to fetal position.    Pregnancy ultrasound has limitations and is unable to detect all forms of fetal congenital abnormalities.      Specific counseling was provided on the following problems:  Recommend she try Pepcid 10 mg tablets over-the-counter that she can take 30 minutes before breakfast and 30 minutes before dinner to see if treating her reflux resolves her intermittent nausea and vomiting.    Follow up recommended:   Recommend a follow-up ultrasound in 8 weeks for growth and missed anatomy.    Pre visit time reviewing her records   5 minutes  Face to face time 10 minutes  Post visit time on documentation of note, updating her problem list, adding orders and prescriptions 5 minutes.  Procedures that were completed today were charged separately.   The level of decision making was low level complexity.    Jazzy Andrew MD

## 2024-05-24 ENCOUNTER — NURSE TRIAGE (OUTPATIENT)
Age: 24
End: 2024-05-24

## 2024-05-24 DIAGNOSIS — O99.340 ANXIETY DURING PREGNANCY: Primary | ICD-10-CM

## 2024-05-24 DIAGNOSIS — F41.9 ANXIETY DURING PREGNANCY: Primary | ICD-10-CM

## 2024-05-24 NOTE — TELEPHONE ENCOUNTER
Patient calling back and discussed some of her struggles including insomnia and anxiety over making sure the baby is okay.  Patient confirms that she would like the [facility] Referral if possible.  Contact number given to patient for [facility] support center for when referral does go through.  Patient currently has no feelings of wanting to harm or hurt self or others.  Advised patient when to report to ER/call crisis line.  Patient verbalized understanding and voiced appreciation for phone call.

## 2024-05-24 NOTE — TELEPHONE ENCOUNTER
Spoke w/patient states that she has a lot of anxiety in regards to the pregnancy as she has had an  in the past and also a miscarriage.  States that she is also struggling with not to vape - has medical marijuana card.  States that is the only thing that helps her eat.  Pt agreeable to being referred to the [facility].  Will place referral today. enies thoughts of harming herself, reviewed going to the ED if those thoughts occur.  Has appointment scheduled w/GZ in our office on 24

## 2024-05-24 NOTE — TELEPHONE ENCOUNTER
Attempted to contact pt to discuss her struggles that she has been experiencing, pt unavailable. She has visit scheduled w/provider  5/31/24  Left detailed message if she would like, I can place a referral for the [facility] to possible help with emotional support.  Encouraged patient to call back if she  desires a referral

## 2024-05-24 NOTE — TELEPHONE ENCOUNTER
Patient is 21w4d gestation, calling with struggles with mental health since stopping psych meds and mood stabilizers when she's gotten pregnant. Pt does state has a medical card but avoids smoking due to pregnancy, however, prior to pregnancy was helping her symptoms. States went to  Latuda from pharmacy and pharmacist informed her that would not recommend taking it during pregnancy. Pt upset and worried with mental health struggle. Pt denies having thoughts or plans to harm herself. RN advised will send a message to the doctor to see if can prescribe any safe medications in pregnancy to aid with mood and depression. Pt agreeable to plan. RN provided emotional support and advised that if she feels she wants to harm herself or someone else - to call Crisis or go to ED. Pt verbalized an understanding. No further questions.

## 2024-05-29 ENCOUNTER — TELEPHONE (OUTPATIENT)
Age: 24
End: 2024-05-29

## 2024-05-29 DIAGNOSIS — Z3A.22 22 WEEKS GESTATION OF PREGNANCY: Primary | ICD-10-CM

## 2024-05-29 DIAGNOSIS — O21.9 NAUSEA AND VOMITING IN PREGNANCY: ICD-10-CM

## 2024-05-29 NOTE — TELEPHONE ENCOUNTER
Patient needs this ASAP    Reason for call:   [x] Refill   [] Prior Auth  [] Other:     Office:   [x] PCP/Provider - Jillian Squires MD   [] Specialty/Provider -     Medication:       Does the patient have enough for 3 days?   [] Yes   [x] No - Send as HP to POD

## 2024-05-30 RX ORDER — ONDANSETRON 4 MG/1
4 TABLET, FILM COATED ORAL EVERY 8 HOURS PRN
Qty: 60 TABLET | Refills: 0 | Status: SHIPPED | OUTPATIENT
Start: 2024-05-30

## 2024-05-31 ENCOUNTER — ROUTINE PRENATAL (OUTPATIENT)
Dept: OBGYN CLINIC | Facility: MEDICAL CENTER | Age: 24
End: 2024-05-31
Payer: COMMERCIAL

## 2024-05-31 VITALS — BODY MASS INDEX: 29.51 KG/M2 | DIASTOLIC BLOOD PRESSURE: 74 MMHG | SYSTOLIC BLOOD PRESSURE: 104 MMHG | WEIGHT: 166.6 LBS

## 2024-05-31 DIAGNOSIS — O99.342 DEPRESSION AFFECTING PREGNANCY IN SECOND TRIMESTER, ANTEPARTUM: Primary | ICD-10-CM

## 2024-05-31 DIAGNOSIS — Z3A.22 22 WEEKS GESTATION OF PREGNANCY: ICD-10-CM

## 2024-05-31 DIAGNOSIS — O99.340 ANXIETY DURING PREGNANCY: ICD-10-CM

## 2024-05-31 DIAGNOSIS — O99.342 BIPOLAR DISEASE DURING PREGNANCY IN SECOND TRIMESTER (HCC): ICD-10-CM

## 2024-05-31 DIAGNOSIS — Z67.91 BLOOD TYPE, RH NEGATIVE: ICD-10-CM

## 2024-05-31 DIAGNOSIS — A60.00 GENITAL HERPES SIMPLEX, UNSPECIFIED SITE: ICD-10-CM

## 2024-05-31 DIAGNOSIS — F32.A DEPRESSION AFFECTING PREGNANCY IN SECOND TRIMESTER, ANTEPARTUM: Primary | ICD-10-CM

## 2024-05-31 DIAGNOSIS — F41.9 ANXIETY DURING PREGNANCY: ICD-10-CM

## 2024-05-31 DIAGNOSIS — F31.9 BIPOLAR DISEASE DURING PREGNANCY IN SECOND TRIMESTER (HCC): ICD-10-CM

## 2024-05-31 PROCEDURE — 99214 OFFICE O/P EST MOD 30 MIN: CPT | Performed by: NURSE PRACTITIONER

## 2024-05-31 RX ORDER — LURASIDONE HYDROCHLORIDE 20 MG/1
20 TABLET, FILM COATED ORAL DAILY
COMMUNITY
Start: 2024-05-22

## 2024-05-31 NOTE — PROGRESS NOTES
Denies loss of fluid, vaginal bleeding and abdominal pain.  Is feeling fetal movement.  Tolerating prenatal vitamin well.  Has not been taking hydroxyzine.  Was recently seen by psychiatrist who recommended restarting Latuda.  Patient states she is having fluctuations between manic and depressive states.  Has a history of bipolar.  Denies thoughts of self-harm or harm to others.  Was fired from her job during this pregnancy.  Was also recently seen for nausea and vomiting and is using Zofran as needed.  Patient continues to vape nicotine and uses medical marijuana.  Uses medical marijuana about 2-3 times per day.  BP: 104/74 weight: +4lbs  Plan  -Continue prenatal vitamin daily  -Bipolar encouraged to continue follow-up with psychiatrist.  Is planning to restart Latuda.  Has appointment with therapy 24.  Has hydroxyzine as needed.  Aware to call office with worsening symptoms, thoughts of self-harm or harm to others.  -Constipation reviewed with patient stop prenatal vitamin for the next 3 days can take MiraLAX or milk of magnesia.  Will then restart oral prenatal vitamin every other day.  Reviewed with patient can use gummy without iron 2-3 times per week to see if constipation improves has been using Colace without benefit  -Patient aware of there is no known safe amount of marijuana use during pregnancy.  Encourage cessation of both marijuana and nicotine  -Follow-up with  center scheduled for 24  -Nausea/vomiting in pregnancy can use Zofran as needed  -28-week labs ordered TSH and free T4 added given increased anxiety/depression symptoms  -Common discomforts of pregnancy and precautions reviewed.  Signs and symptoms to report reviewed.  RTO 2 weeks for follow up bipolar

## 2024-06-13 ENCOUNTER — NURSE TRIAGE (OUTPATIENT)
Age: 24
End: 2024-06-13

## 2024-06-13 NOTE — TELEPHONE ENCOUNTER
"Spoke with patient who is 24w3d, .  She reports 3 days of intermittent dizziness described as room spinning as well as intermittent lightheadedness with feeling unsteady on feet.  She also reports intermittent headaches, but none today.  She reports she has not been outside, has been drinking fluids and has had normal fetal movement.  She denies any vision changes/arm or leg weakness, no s/s of stroke.  She was advised to go to the ER at [facility].  No further questions or concerns at this time.  She will have someone drive her to the hospital.      Reason for Disposition   SEVERE dizziness (e.g., unable to stand, requires support to walk, feels like passing out now)    Answer Assessment - Initial Assessment Questions  1. DESCRIPTION: \"Describe your dizziness.\"      Room is spinning even if laying down.   2. LIGHTHEADED: \"Do you feel lightheaded?\" (e.g., somewhat faint, woozy, weak upon standing)      Yes   3. VERTIGO: \"Do you feel like either you or the room is spinning or tilting?\" (i.e. vertigo)      yes  4. SEVERITY: \"How bad is it?\"  \"Do you feel like you are going to faint?\" \"Can you stand and walk?\"    - MILD: Feels slightly dizzy, but walking normally.    - MODERATE: Feels very unsteady when walking, but not falling; interferes with normal activities (e.g., school, work) .    - SEVERE: Unable to walk without falling, or requires assistance to walk without falling; feels like passing out now.       Intermittently moderate  5. ONSET:  \"When did the dizziness begin?\"      Last 3 days intermittent all day.  6. AGGRAVATING FACTORS: \"Does anything make it worse?\" (e.g., standing, change in head position)      no  7. HEART RATE: \"Can you tell me your heart rate?\" \"How many beats in 15 seconds?\"  (Note: not all patients can do this)        64  8. CAUSE: \"What do you think is causing the dizziness?\"      unsure  9. RECURRENT SYMPTOM: \"Have you had dizziness before?\" If Yes, ask: \"When was the last time?\" \"What " "happened that time?\"      no  10. OTHER SYMPTOMS: \"Do you have any other symptoms?\" (e.g., fever, chest pain, vomiting, diarrhea, bleeding)        no  11. PREGNANCY: \"Is there any chance you are pregnant?\" \"When was your last menstrual period?\"        24w3d    Protocols used: Dizziness-ADULT-[place]    "

## 2024-06-19 PROBLEM — O26.892 RH NEGATIVE STATUS IN SINGLETON PREGNANCY IN SECOND TRIMESTER: Status: ACTIVE | Noted: 2023-07-06

## 2024-06-27 ENCOUNTER — ROUTINE PRENATAL (OUTPATIENT)
Dept: OBGYN CLINIC | Facility: MEDICAL CENTER | Age: 24
End: 2024-06-27
Payer: COMMERCIAL

## 2024-06-27 VITALS
SYSTOLIC BLOOD PRESSURE: 120 MMHG | DIASTOLIC BLOOD PRESSURE: 72 MMHG | WEIGHT: 178 LBS | BODY MASS INDEX: 31.54 KG/M2 | HEIGHT: 63 IN

## 2024-06-27 DIAGNOSIS — M54.9 BACK PAIN IN PREGNANCY: ICD-10-CM

## 2024-06-27 DIAGNOSIS — O99.342 BIPOLAR DISEASE DURING PREGNANCY IN SECOND TRIMESTER (HCC): ICD-10-CM

## 2024-06-27 DIAGNOSIS — F41.9 ANXIETY DURING PREGNANCY: Primary | ICD-10-CM

## 2024-06-27 DIAGNOSIS — O26.892 RH NEGATIVE STATUS IN SINGLETON PREGNANCY IN SECOND TRIMESTER: ICD-10-CM

## 2024-06-27 DIAGNOSIS — F31.9 BIPOLAR DISEASE DURING PREGNANCY IN SECOND TRIMESTER (HCC): ICD-10-CM

## 2024-06-27 DIAGNOSIS — Z3A.26 26 WEEKS GESTATION OF PREGNANCY: ICD-10-CM

## 2024-06-27 DIAGNOSIS — Z67.91 RH NEGATIVE STATUS IN SINGLETON PREGNANCY IN SECOND TRIMESTER: ICD-10-CM

## 2024-06-27 DIAGNOSIS — F32.A DEPRESSION AFFECTING PREGNANCY IN SECOND TRIMESTER, ANTEPARTUM: ICD-10-CM

## 2024-06-27 DIAGNOSIS — O99.891 BACK PAIN IN PREGNANCY: ICD-10-CM

## 2024-06-27 DIAGNOSIS — O99.340 ANXIETY DURING PREGNANCY: Primary | ICD-10-CM

## 2024-06-27 DIAGNOSIS — O99.342 DEPRESSION AFFECTING PREGNANCY IN SECOND TRIMESTER, ANTEPARTUM: ICD-10-CM

## 2024-06-27 PROCEDURE — 99213 OFFICE O/P EST LOW 20 MIN: CPT | Performed by: NURSE PRACTITIONER

## 2024-06-27 NOTE — PROGRESS NOTES
Denies loss of fluid, vaginal bleeding and abdominal pain.  Confirms fetal movement.  Tolerating prenatal vitamins fairly.  Has noticed some nausea and vomiting with vitamins.  Is going to try a different vitamin.  Continues to have manic and depressive episodes.  Met with psychiatrist who recommended restarting Latuda.  Patient is not interested in starting Latuda during pregnancy.  Did not meet with therapist.  Denies thoughts of self-harm or harm to others.  Continues to use marijuana about 2-3 times per day.  Has been having difficulty sleeping.  Will sleep for about 3 to 4 hours per night and wake up for about 3 to 4 hours.  Patient admits to watching TV or playing on her phone during that time.  Is not currently working with and will nap during the day.  Has not completed labs.  Denies other questions or concerns.  BP: 120/72 weight: +16lbs  Plan  -Continue prenatal vitamin daily.  Reviewed with patient can try and [place] complete take 2 chewables daily.  Can also try any other prenatal vitamin recommend avoiding Gummies unless they contain iron   -Bipolar encouraged to schedule appointment with Delia.  Reviewed options such as calm ciaran, meditation can use guided meditation on YouTube reviewed use of music especially InStitchu.  Can call office with worsening thoughts, thoughts of self-harm or harm to others is known to psychiatry  -Insomnia encouraged to avoid napping, daily exercise, reviewed use of magnesium, CALM drink.  Referral placed for PT for back pain  -Encouraged to complete blood work  -Reviewed with patient there is no known safe amount of marijuana use during pregnancy encouraged cessation  -Common discomforts of pregnancy and precautions including  labor reviewed.  Signs and symptoms to report reviewed  RTO 2 weeks f/u labs, mental health, PT  & sleep     Patient  goal for next visit to incorporate at least 3 conservative measures for sleep and mental health.  Complete labs and  schedule appointment with Delia/therapist/psychiatrist

## 2024-07-03 ENCOUNTER — APPOINTMENT (OUTPATIENT)
Dept: LAB | Facility: MEDICAL CENTER | Age: 24
End: 2024-07-03
Payer: COMMERCIAL

## 2024-07-03 DIAGNOSIS — Z3A.22 22 WEEKS GESTATION OF PREGNANCY: ICD-10-CM

## 2024-07-03 LAB — GLUCOSE 1H P 50 G GLC PO SERPL-MCNC: 88 MG/DL (ref 70–134)

## 2024-07-03 PROCEDURE — 82950 GLUCOSE TEST: CPT

## 2024-07-04 ENCOUNTER — NURSE TRIAGE (OUTPATIENT)
Dept: OTHER | Facility: OTHER | Age: 24
End: 2024-07-04

## 2024-07-04 NOTE — TELEPHONE ENCOUNTER
"Regarding: medication concern  ----- Message from Jayne ZAVALA sent at 7/4/2024  2:16 PM EDT -----  \"I was just discharged from the hospital due to severe back pain, and the ED prescribed me a muscle relaxer. I am 27 weeks pregnant and now the pharmacy is telling me there are severe risks with this since I'm pregnant even though the hospital knew I was pregnant.\"    "

## 2024-07-04 NOTE — TELEPHONE ENCOUNTER
"Reason for Disposition  • [1] Caller has URGENT medicine question about med that PCP or specialist prescribed AND [2] triager unable to answer question    Answer Assessment - Initial Assessment Questions  1. NAME of MEDICATION: \"What medicine are you calling about?\"      Flexeril     2. QUESTION: \"What is your question?\" (e.g., medication refill, side effect)      Is this safe to take during pregnancy?    3. PRESCRIBING HCP: \"Who prescribed it?\" Reason: if prescribed by specialist, call should be referred to that group.      [facility]     4. SYMPTOMS: \"Do you have any symptoms?\"      Back pain that improves after taking Flexeril    5. SEVERITY: If symptoms are present, ask \"Are they mild, moderate or severe?\"      Denies any other symptoms or pregnancy issues     6. PREGNANCY:  \"Is there any chance that you are pregnant?\" \"When was your last menstrual period?\"      27w3d    Protocols used: Medication Question Call-ADULT-AH    "

## 2024-07-04 NOTE — TELEPHONE ENCOUNTER
Patient calling in today because yesterday she was evaluated at [facility] for severe back pain and was discharged home with a prescription for Flexeril. Patient stated that her pharmacist told her the Flexeril is not safe during pregnancy and she wanted to verify if she can take the medication or not. Denies any pregnancy complaints or issues at this time. On call provider contacted, stated Flexeril is listed as a Category B medication but is safe to take during pregnancy. Patient made aware and verbalized understanding.

## 2024-07-06 DIAGNOSIS — O99.013 ANEMIA DURING PREGNANCY IN THIRD TRIMESTER: Primary | ICD-10-CM

## 2024-07-06 DIAGNOSIS — O99.283 HYPOTHYROIDISM AFFECTING PREGNANCY IN THIRD TRIMESTER: ICD-10-CM

## 2024-07-06 DIAGNOSIS — E03.9 HYPOTHYROIDISM AFFECTING PREGNANCY IN THIRD TRIMESTER: ICD-10-CM

## 2024-07-06 RX ORDER — LEVOTHYROXINE SODIUM 0.03 MG/1
25 TABLET ORAL DAILY
Qty: 30 TABLET | Refills: 1 | Status: SHIPPED | OUTPATIENT
Start: 2024-07-06

## 2024-07-06 RX ORDER — FERROUS SULFATE 324(65)MG
324 TABLET, DELAYED RELEASE (ENTERIC COATED) ORAL
Qty: 90 TABLET | Refills: 0 | Status: SHIPPED | OUTPATIENT
Start: 2024-07-06

## 2024-07-08 ENCOUNTER — TELEPHONE (OUTPATIENT)
Dept: PERINATAL CARE | Facility: CLINIC | Age: 24
End: 2024-07-08

## 2024-07-10 NOTE — TELEPHONE ENCOUNTER
Spoke to Mary Jane to confirm we could see her in our [place] office on 7/11/24 instead of [place] location. She verbalized understanding of time, date and location.

## 2024-07-11 ENCOUNTER — ULTRASOUND (OUTPATIENT)
Dept: PERINATAL CARE | Facility: OTHER | Age: 24
End: 2024-07-11
Payer: COMMERCIAL

## 2024-07-11 VITALS
HEIGHT: 63 IN | DIASTOLIC BLOOD PRESSURE: 82 MMHG | SYSTOLIC BLOOD PRESSURE: 126 MMHG | BODY MASS INDEX: 31.86 KG/M2 | WEIGHT: 179.8 LBS | HEART RATE: 80 BPM

## 2024-07-11 DIAGNOSIS — Z36.2 ENCOUNTER FOR OTHER ANTENATAL SCREENING FOLLOW-UP: Primary | ICD-10-CM

## 2024-07-11 DIAGNOSIS — Z3A.28 28 WEEKS GESTATION OF PREGNANCY: ICD-10-CM

## 2024-07-11 PROCEDURE — 99213 OFFICE O/P EST LOW 20 MIN: CPT | Performed by: OBSTETRICS & GYNECOLOGY

## 2024-07-11 PROCEDURE — 76816 OB US FOLLOW-UP PER FETUS: CPT | Performed by: OBSTETRICS & GYNECOLOGY

## 2024-07-11 NOTE — PROGRESS NOTES
The patient was seen today for an ultrasound.  Please see ultrasound report (located under Ob Procedures) for additional details.   Thank you very much for allowing us to participate in the care of this very nice patient.  Should you have any questions, please do not hesitate to contact me.     Doni Fernandes MD FACOG  Attending Physician, Maternal-Fetal Medicine  [facility]

## 2024-07-12 PROBLEM — O99.343 DEPRESSION COMPLICATING PREGNANCY IN THIRD TRIMESTER, ANTEPARTUM: Status: ACTIVE | Noted: 2024-02-20

## 2024-07-12 PROBLEM — O99.343 BIPOLAR DISEASE DURING PREGNANCY IN THIRD TRIMESTER (HCC): Status: ACTIVE | Noted: 2024-05-31

## 2024-07-12 PROBLEM — O26.893 RH NEGATIVE STATUS IN SINGLETON PREGNANCY IN THIRD TRIMESTER: Status: ACTIVE | Noted: 2023-07-06

## 2024-07-16 ENCOUNTER — ROUTINE PRENATAL (OUTPATIENT)
Dept: OBGYN CLINIC | Facility: MEDICAL CENTER | Age: 24
End: 2024-07-16
Payer: COMMERCIAL

## 2024-07-16 VITALS — WEIGHT: 184.4 LBS | BODY MASS INDEX: 32.66 KG/M2 | SYSTOLIC BLOOD PRESSURE: 116 MMHG | DIASTOLIC BLOOD PRESSURE: 64 MMHG

## 2024-07-16 DIAGNOSIS — F31.9 BIPOLAR DISEASE DURING PREGNANCY IN THIRD TRIMESTER (HCC): ICD-10-CM

## 2024-07-16 DIAGNOSIS — Z67.91 RH NEGATIVE STATUS IN SINGLETON PREGNANCY IN THIRD TRIMESTER: ICD-10-CM

## 2024-07-16 DIAGNOSIS — O99.340 ANXIETY DURING PREGNANCY: Primary | ICD-10-CM

## 2024-07-16 DIAGNOSIS — O99.343 BIPOLAR DISEASE DURING PREGNANCY IN THIRD TRIMESTER (HCC): ICD-10-CM

## 2024-07-16 DIAGNOSIS — Z3A.29 29 WEEKS GESTATION OF PREGNANCY: ICD-10-CM

## 2024-07-16 DIAGNOSIS — O26.893 RH NEGATIVE STATUS IN SINGLETON PREGNANCY IN THIRD TRIMESTER: ICD-10-CM

## 2024-07-16 DIAGNOSIS — F41.9 ANXIETY DURING PREGNANCY: Primary | ICD-10-CM

## 2024-07-16 DIAGNOSIS — F32.A DEPRESSION COMPLICATING PREGNANCY IN THIRD TRIMESTER, ANTEPARTUM: ICD-10-CM

## 2024-07-16 DIAGNOSIS — O99.343 DEPRESSION COMPLICATING PREGNANCY IN THIRD TRIMESTER, ANTEPARTUM: ICD-10-CM

## 2024-07-16 LAB
DME PARACHUTE DELIVERY DATE REQUESTED: NORMAL
DME PARACHUTE ITEM DESCRIPTION: NORMAL
DME PARACHUTE ORDER STATUS: NORMAL
DME PARACHUTE SUPPLIER NAME: NORMAL
DME PARACHUTE SUPPLIER PHONE: NORMAL

## 2024-07-16 PROCEDURE — 99213 OFFICE O/P EST LOW 20 MIN: CPT | Performed by: NURSE PRACTITIONER

## 2024-07-16 PROCEDURE — 96372 THER/PROPH/DIAG INJ SC/IM: CPT | Performed by: NURSE PRACTITIONER

## 2024-07-16 RX ORDER — CEPHALEXIN 500 MG/1
500 CAPSULE ORAL 4 TIMES DAILY
COMMUNITY
Start: 2024-07-11

## 2024-07-16 NOTE — PROGRESS NOTES
Denies loss of fluid, vaginal bleeding and abdominal pain.  Confirms frequent fetal movement.  Tolerating iron, prenatal vitamin well.  Stopped levothyroxine as it was giving her anxiety/depression.  Reviewed recent labs.  Blood type is O- due for RhoGAM today.  FOB believes he is O- unable to confirm in EMR.  Will plan for RhoGAM.  Recently  had to put her dog down.  States depression has been a little worse.  Continues to have difficulty sleeping and does not plan to meet with therapist.  Denies self-harm or harm to others.  Continues to use marijuana 1-2 times per day and is using tobacco.      center ultrasound reviewed-24-Vertex presentation, normal-appearing fetal growth, anterior placenta, WILFRED-WNL and EFW 1215g/2 pounds 11 ounces (36%).  Recommendation for follow-up as clinically indicated     Patient plans include breast-feeding, epidural for pain control during labor, uncertain regarding pediatrician or postpartum contraception at this time.  /64 weight: + 22lbs   Plan  -Continue prenatal vitamins daily  -Fetal kick counts reviewed, encouraged daily and written information provided  -28-week folder provided and reviewed.  Breast pump ordered today.  -Bipolar encouraged to schedule appointment with Delia encouraged to call office with worsening symptoms, thoughts of self-harm or harm to others  -Reviewed with patient there is no known safe amount of marijuana use during pregnancy encouraged cessation  -Complete Keflex course as ordered s/p I&D of boil on leg   -Common discomforts of pregnancy and precautions including  labor reviewed.  Signs and symptoms to report reviewed.  RTO 2 weeks f/u pediatrician and postpartum contraception

## 2024-07-18 ENCOUNTER — NURSE TRIAGE (OUTPATIENT)
Age: 24
End: 2024-07-18

## 2024-07-18 NOTE — TELEPHONE ENCOUNTER
"Patient called in stating that she is 29w3d , pt reporting she has been having diarrhea since 2 weeks ago, pt reporting that she is going up to 7 times a day. Patient reporting she doesn't have any abdominal pain. Pt reporting she is on Keflex and the abx was started 5 days ago for a boil on her leg. Pt reporting +FM, pt denies leakage of fluid, vaginal bleeding, contractions    Epic Secure Chat sent to Dr. Lim  Epic Secure Chat received: Diarrhea likely secondary to antibiotics. Recommend starting a probiotic and immodium     Patient was notified of Dr Rios's recommendations, pt verbalized understanding, no further questions at this time.         Reason for Disposition   SEVERE diarrhea (e.g., 7 or more times / day more than normal)    Answer Assessment - Initial Assessment Questions  1. DIARRHEA SEVERITY: \"How bad is the diarrhea?\" \"How many extra stools have you had in the past 24 hours than normal?\"     - NO DIARRHEA (SCALE 0)    - MILD (SCALE 1-3): Few loose or mushy BMs; increase of 1-3 stools over normal daily number of stools; mild increase in ostomy output.    -  MODERATE (SCALE 4-7): Increase of 4-6 stools daily over normal; moderate increase in ostomy output.  * SEVERE (SCALE 8-10; OR 'WORST POSSIBLE'): Increase of 7 or more stools daily over normal; moderate increase in ostomy output; incontinence.      Pt reporting diarrhea up to 7 times a day- severe   2. ONSET: \"When did the diarrhea begin?\"       2 weeeks ago   3. BM CONSISTENCY: \"How loose or watery is the diarrhea?\"       Watery and \"really loose\" as per pt report.   4. VOMITING: \"Are you also vomiting?\" If Yes, ask: \"How many times in the past 24 hours?\"       Pt denies   5. ABDOMINAL PAIN: \"Are you having any abdominal pain?\" If Yes, ask: \"What does it feel like?\" (e.g., crampy, dull, intermittent, constant)       denies  6. ABDOMINAL PAIN SEVERITY: If present, ask: \"How bad is the pain?\"  (e.g., Scale 1-10; mild, moderate, or " "severe)    - MILD (1-3): doesn't interfere with normal activities, abdomen soft and not tender to touch     - MODERATE (4-7): interferes with normal activities or awakens from sleep, tender to touch     - SEVERE (8-10): excruciating pain, doubled over, unable to do any normal activities        Denies   7. ORAL INTAKE: If vomiting, \"Have you been able to drink liquids?\" \"How much fluids have you had in the past 24 hours?\"      Pt reporting she can drink liquids   8. HYDRATION: \"Any signs of dehydration?\" (e.g., dry mouth [not just dry lips], too weak to stand, dizziness, new weight loss) \"When did you last urinate?\"      Pt denies, pt last urinated 20 min ago.   9. EXPOSURE: \"Have you traveled to a foreign country recently?\" \"Have you been exposed to anyone with diarrhea?\" \"Could you have eaten any food that was spoiled?\"      Pt denies  10. ANTIBIOTIC USE: \"Are you taking antibiotics now or have you taken antibiotics in the past 2 months?\"        Pt reporting starting abx 5 days ago.   11. OTHER SYMPTOMS: \"Do you have any other symptoms?\" (e.g., fever, blood in stool)        Pt denies   12. PREGNANCY: \"Is there any chance you are pregnant?\" \"When was your last menstrual period?\"        29w3d    Protocols used: Diarrhea-ADULT-[place]    "

## 2024-07-26 ENCOUNTER — ROUTINE PRENATAL (OUTPATIENT)
Dept: OBGYN CLINIC | Facility: MEDICAL CENTER | Age: 24
End: 2024-07-26
Payer: COMMERCIAL

## 2024-07-26 ENCOUNTER — TELEPHONE (OUTPATIENT)
Age: 24
End: 2024-07-26

## 2024-07-26 ENCOUNTER — TELEPHONE (OUTPATIENT)
Dept: OBGYN CLINIC | Facility: MEDICAL CENTER | Age: 24
End: 2024-07-26

## 2024-07-26 VITALS
DIASTOLIC BLOOD PRESSURE: 74 MMHG | WEIGHT: 185 LBS | HEIGHT: 63 IN | BODY MASS INDEX: 32.78 KG/M2 | SYSTOLIC BLOOD PRESSURE: 120 MMHG

## 2024-07-26 DIAGNOSIS — D50.8 IRON DEFICIENCY ANEMIA SECONDARY TO INADEQUATE DIETARY IRON INTAKE: ICD-10-CM

## 2024-07-26 DIAGNOSIS — O99.343 DEPRESSION COMPLICATING PREGNANCY IN THIRD TRIMESTER, ANTEPARTUM: ICD-10-CM

## 2024-07-26 DIAGNOSIS — O26.893 RH NEGATIVE STATUS IN SINGLETON PREGNANCY IN THIRD TRIMESTER: ICD-10-CM

## 2024-07-26 DIAGNOSIS — Z67.91 RH NEGATIVE STATUS IN SINGLETON PREGNANCY IN THIRD TRIMESTER: ICD-10-CM

## 2024-07-26 DIAGNOSIS — Z3A.30 30 WEEKS GESTATION OF PREGNANCY: Primary | ICD-10-CM

## 2024-07-26 DIAGNOSIS — F32.A DEPRESSION COMPLICATING PREGNANCY IN THIRD TRIMESTER, ANTEPARTUM: ICD-10-CM

## 2024-07-26 PROCEDURE — 99213 OFFICE O/P EST LOW 20 MIN: CPT | Performed by: OBSTETRICS & GYNECOLOGY

## 2024-07-26 RX ORDER — SODIUM CHLORIDE 9 MG/ML
20 INJECTION, SOLUTION INTRAVENOUS ONCE
Status: CANCELLED | OUTPATIENT
Start: 2024-07-29

## 2024-07-26 RX ORDER — BUPROPION HYDROCHLORIDE 100 MG/1
100 TABLET, EXTENDED RELEASE ORAL 2 TIMES DAILY
Qty: 90 TABLET | Refills: 3 | Status: SHIPPED | OUTPATIENT
Start: 2024-07-26 | End: 2024-08-16

## 2024-07-26 NOTE — TELEPHONE ENCOUNTER
According to [facility] Prior Authorization Lookup Took, no Prior Auth is required for Iron Infusions.     Please see image below.

## 2024-07-26 NOTE — TELEPHONE ENCOUNTER
Scheduled first Fe infusion at [facility] 7/29/24 at 2pm and on Thursday 8/1/24 at 2pm. Pt aware and agreeable  ----- Message from Kathy MCGOWAN sent at 7/26/2024  1:08 PM EDT -----  Regarding: Iron infusions  Hiii! Sushila Ochoa wants your pt to get infusions. Therapy plan already in chart. She wants to go to [place] :)  ----- Message -----  From: Anson Conti MD  Sent: 7/26/2024  11:21 AM EDT  To: Kathy Whyte RN    Iron infusion

## 2024-07-26 NOTE — TELEPHONE ENCOUNTER
----- Message from Moira ALAMO sent at 7/26/2024  1:20 PM EDT -----  Regarding: FW: Iron infusions  Mary Jane is scheduled for her first Fe infusion Monday 7/29/24 at 2pm, Can you see if she needs a prior auth?  Thank you  ----- Message -----  From: Kathy Whyte RN  Sent: 7/26/2024   1:10 PM EDT  To: Moira Lovell RN  Subject: Iron infusions                                   Hiii! Sushila Ochoa wants your pt to get infusions. Therapy plan already in chart. She wants to go to [place] :)  ----- Message -----  From: Anson Conti MD  Sent: 7/26/2024  11:21 AM EDT  To: Kathy Whyte RN    Iron infusion

## 2024-08-05 ENCOUNTER — HOSPITAL ENCOUNTER (OUTPATIENT)
Dept: INFUSION CENTER | Facility: CLINIC | Age: 24
Discharge: HOME | End: 2024-08-05
Payer: COMMERCIAL

## 2024-08-05 VITALS
TEMPERATURE: 98.2 F | DIASTOLIC BLOOD PRESSURE: 69 MMHG | SYSTOLIC BLOOD PRESSURE: 113 MMHG | HEART RATE: 74 BPM | RESPIRATION RATE: 18 BRPM

## 2024-08-05 DIAGNOSIS — D50.8 IRON DEFICIENCY ANEMIA SECONDARY TO INADEQUATE DIETARY IRON INTAKE: ICD-10-CM

## 2024-08-05 DIAGNOSIS — Z3A.30 30 WEEKS GESTATION OF PREGNANCY: Primary | ICD-10-CM

## 2024-08-05 PROCEDURE — 96365 THER/PROPH/DIAG IV INF INIT: CPT

## 2024-08-05 RX ORDER — SODIUM CHLORIDE 9 MG/ML
20 INJECTION, SOLUTION INTRAVENOUS ONCE
OUTPATIENT
Start: 2024-08-12

## 2024-08-05 RX ORDER — SODIUM CHLORIDE 9 MG/ML
20 INJECTION, SOLUTION INTRAVENOUS ONCE
Status: COMPLETED | OUTPATIENT
Start: 2024-08-05 | End: 2024-08-05

## 2024-08-05 RX ADMIN — IRON SUCROSE 200 MG: 20 INJECTION, SOLUTION INTRAVENOUS at 15:09

## 2024-08-05 RX ADMIN — SODIUM CHLORIDE 20 ML/HR: 9 INJECTION, SOLUTION INTRAVENOUS at 15:07

## 2024-08-05 NOTE — PROGRESS NOTES
Patient tolerated first venofer without complication. Intake assessment completed. AVS declined, aware of next appointment on 8/8 @ 14:00, patient left unit in stable condition.

## 2024-08-07 ENCOUNTER — NURSE TRIAGE (OUTPATIENT)
Age: 24
End: 2024-08-07

## 2024-08-07 PROBLEM — Z3A.32 32 WEEKS GESTATION OF PREGNANCY: Status: ACTIVE | Noted: 2024-03-26

## 2024-08-07 NOTE — TELEPHONE ENCOUNTER
"Patient called office c/o reaction with iron infusions . She states she had first iron infusion this Monday and had nausea and dizziness,  that lasted 48 hours.  During infusion she had diaphoresis and was cold. She could not eat for 48 hours and had 4 episodes of diarrhea. This morning was the first time she was able to eat anything. She has been able to drink, does not feel dehydrated, she has not had any vomiting. She denies decreased fetal movement, vaginal bleeding, abdominal pain, contractions, loss of fluid. Her next scheduled iron infusion is tomorrow, she would like to know if she can discontinue infusions and go back to taking oral iron, although she has reaction to oral iron as well. She would like further advice.   Routing to provider.  Advised her to call back if symptoms worsen.       Reason for Disposition   Caller has NON-URGENT medicine question about med that PCP or specialist prescribed and triager unable to answer question    Answer Assessment - Initial Assessment Questions  1. NAME of MEDICATION: \"What medicine are you calling about?\"      Iron infusion  2. QUESTION: \"What is your question?\" (e.g., medication refill, side effect)      reaction  3. PRESCRIBING HCP: \"Who prescribed it?\" Reason: if prescribed by specialist, call should be referred to that group.      [facility]  4. SYMPTOMS: \"Do you have any symptoms?\"      Nauseous   5. SEVERITY: If symptoms are present, ask \"Are they mild, moderate or severe?\"      Couldn't eat anything the entire day. Abdominal pain. Dizziness the morning after.    6. PREGNANCY:  \"Is there any chance that you are pregnant?\" \"When was your last menstrual period?\"      32 weeks 2 days pregnant.    Protocols used: Medication Question Call-ADULT-[place]    "

## 2024-08-08 ENCOUNTER — HOSPITAL ENCOUNTER (OUTPATIENT)
Dept: INFUSION CENTER | Facility: CLINIC | Age: 24
Discharge: HOME | End: 2024-08-08

## 2024-08-08 ENCOUNTER — HOSPITAL ENCOUNTER (OUTPATIENT)
Facility: HOSPITAL | Age: 24
Discharge: HOME | End: 2024-08-08
Attending: OBSTETRICS & GYNECOLOGY | Admitting: OBSTETRICS & GYNECOLOGY
Payer: COMMERCIAL

## 2024-08-08 ENCOUNTER — ROUTINE PRENATAL (OUTPATIENT)
Dept: OBGYN CLINIC | Facility: MEDICAL CENTER | Age: 24
End: 2024-08-08
Payer: COMMERCIAL

## 2024-08-08 VITALS — BODY MASS INDEX: 33.75 KG/M2 | SYSTOLIC BLOOD PRESSURE: 114 MMHG | DIASTOLIC BLOOD PRESSURE: 70 MMHG | WEIGHT: 190.5 LBS

## 2024-08-08 VITALS
DIASTOLIC BLOOD PRESSURE: 67 MMHG | SYSTOLIC BLOOD PRESSURE: 116 MMHG | RESPIRATION RATE: 16 BRPM | HEART RATE: 86 BPM | TEMPERATURE: 97.8 F

## 2024-08-08 DIAGNOSIS — F32.A DEPRESSION COMPLICATING PREGNANCY IN THIRD TRIMESTER, ANTEPARTUM: Primary | ICD-10-CM

## 2024-08-08 DIAGNOSIS — O21.0 HYPEREMESIS GRAVIDARUM: ICD-10-CM

## 2024-08-08 DIAGNOSIS — A60.00 GENITAL HERPES SIMPLEX, UNSPECIFIED SITE: ICD-10-CM

## 2024-08-08 DIAGNOSIS — D50.8 IRON DEFICIENCY ANEMIA SECONDARY TO INADEQUATE DIETARY IRON INTAKE: ICD-10-CM

## 2024-08-08 DIAGNOSIS — O36.8390 ABNORMAL FETAL HEART RATE AFFECTING PREGNANCY: ICD-10-CM

## 2024-08-08 DIAGNOSIS — Z34.93 THIRD TRIMESTER PREGNANCY: ICD-10-CM

## 2024-08-08 DIAGNOSIS — Z67.91 RH NEGATIVE STATUS IN SINGLETON PREGNANCY IN THIRD TRIMESTER: ICD-10-CM

## 2024-08-08 DIAGNOSIS — Z3A.32 32 WEEKS GESTATION OF PREGNANCY: ICD-10-CM

## 2024-08-08 DIAGNOSIS — O99.343 DEPRESSION COMPLICATING PREGNANCY IN THIRD TRIMESTER, ANTEPARTUM: Primary | ICD-10-CM

## 2024-08-08 DIAGNOSIS — O26.893 RH NEGATIVE STATUS IN SINGLETON PREGNANCY IN THIRD TRIMESTER: ICD-10-CM

## 2024-08-08 PROCEDURE — 99202 OFFICE O/P NEW SF 15 MIN: CPT

## 2024-08-08 PROCEDURE — 99213 OFFICE O/P EST LOW 20 MIN: CPT | Performed by: ADVANCED PRACTICE MIDWIFE

## 2024-08-08 PROCEDURE — NC001 PR NO CHARGE: Performed by: OBSTETRICS & GYNECOLOGY

## 2024-08-08 NOTE — PROGRESS NOTES
Routine Prenatal Visit  [facility] [address], [place], [place]    Assessment/Plan:  Mary Jane is a 24 y.o. year old  at 32w3d who presents for routine prenatal visit.     1. Depression complicating pregnancy in third trimester, antepartum  2. Rh negative status in grady pregnancy in third trimester  3. Genital herpes simplex, unspecified site  Assessment & Plan:  - to start Valtrex at 35 weeks  4. Iron deficiency anemia secondary to inadequate dietary iron intake  5. Third trimester pregnancy  6. 32 weeks gestation of pregnancy  Assessment & Plan:  - reviewed s/s PTL, [facility]  - currently getting FE infusions  -Taking PNV  - next visit 2 weeks  7. Hyperemesis gravidarum  8. Abnormal fetal heart rate affecting pregnancy    - noted -180s with prolonged auscultation, no noted decrease in rate  - started FE infusions yesterday and was not feeling well post infusion  - positive nicotine- vapes  - will send to L&D for further eval     Subjective:     CC: Prenatal care    Mary Jane Reyes is a 24 y.o.  female who presents for routine prenatal care at 32w3d.  Pregnancy ROS: no leakage of fluid, pelvic pain, or vaginal bleeding.  Notes that baby became super active this morning. Positive use of nicotine. Had 2 sips of coffee. Repeat heart tones 170-180s will send for further monitoring.    The following portions of the patient's history were reviewed and updated as appropriate: allergies, current medications, past family history, past medical history, obstetric history, gynecologic history, past social history, past surgical history and problem list.      Objective:  /70   Wt 86.4 kg (190 lb 8 oz)   LMP 2023 (Exact Date)   BMI 33.75 kg/m²   Pregravid Weight/BMI: 73.5 kg (162 lb) (BMI 28.70)  Current Weight: 86.4 kg (190 lb 8 oz)   Total Weight Gain: 12.9 kg (28 lb 8 oz)   Pre-Beny Vitals    Flowsheet Row Most Recent Value   Prenatal Assessment    Fetal  Heart Rate 181   Movement Present   Prenatal Vitals    Blood Pressure 114/70   Weight - Scale 86.4 kg (190 lb 8 oz)   Urine Albumin/Glucose    Dilation/Effacement/Station    Vaginal Drainage    Draining Fluid No   Edema    LLE Edema None   RLE Edema None   Facial Edema None           General: Well appearing, no distress  Respiratory: Unlabored breathing  Cardiovascular: Regular rate.  Abdomen: Soft, gravid, nontender  Fundal Height: Appropriate for gestational age.  Extremities: Warm and well perfused.  Non tender.

## 2024-08-08 NOTE — PROGRESS NOTES
Triage Note - OB  Mary Jane Reyes 24 y.o. female MRN: [id number]  Unit/Bed#:  TRIAGE 2-01 Encounter: [id number]    OB TRIAGE NOTE  Mary Jane Reyes  [id number]  2024  12:26 PM  LD TRIAGE 2/LD TRIAGE 2-*    ASSESS:  24 y.o.  32w3d with fetal tachycardia at outpatient prenatal visit. NST in triage reactive and reassuring with baseline 150 bpm. No OB complaints, stable for discharge.     PLAN  #1. Fetal Tachycardia  FHR 170s-180s by doppler in office  NST reactive, baseline 150 bpm   Maternal VSS, afebrile and asymptomatic  Endorses good FM, denies VB, LOF, contractions/cramping    #2. IUP @ 32w3d  Pregnancy complicated by Rh negative status, iron deficiency anemia, hyperemesis gravidarum, HSV.  Continue routine prenatal care.     #3. Discharge instructions  Patient instructed to call if experiencing worsening contractions, vaginal bleeding, loss of fluid or decreased fetal movement.  Will follow up with OBGYN on 2024.    D/w Dr. Mascorro  ______________    SUBJECTIVE    LARRY: Estimated Date of Delivery: 24    HPI Chronology:  24 y.o.  32w3d presents with complaint of fetal tachycardia at outpatient prenatal visit. She states that she feels baby move as normal. She feels well, denies fevers, chills. Denies caffeine intake, use of recreational drugs or stimulants.     Contractions: denies  Leakage: denies  Bleeding: denies  Fetal Movement: endorses good movement    Vitals:   /67 (BP Location: Left arm)   Pulse 86   Temp 97.8 °F (36.6 °C) (Temporal)   Resp 16   LMP 2023 (Exact Date)   There is no height or weight on file to calculate BMI.    Review of Systems   Constitutional:  Negative for chills and fever.   Eyes:  Negative for visual disturbance.   Respiratory:  Negative for shortness of breath.    Cardiovascular:  Negative for chest pain and palpitations.   Gastrointestinal:  Negative for abdominal pain.   Genitourinary:  Negative for vaginal bleeding.   Skin: Negative.     Neurological:  Negative for dizziness, numbness and headaches.   Psychiatric/Behavioral: Negative.         Physical Exam  Vitals reviewed.   Constitutional:       General: She is not in acute distress.     Appearance: Normal appearance.   HENT:      Head: Normocephalic and atraumatic.   Eyes:      Extraocular Movements: Extraocular movements intact.   Cardiovascular:      Rate and Rhythm: Normal rate.   Pulmonary:      Effort: Pulmonary effort is normal. No respiratory distress.   Abdominal:      Comments: gravid   Musculoskeletal:         General: Normal range of motion.      Cervical back: Normal range of motion.   Skin:     General: Skin is warm and dry.   Neurological:      Mental Status: She is alert and oriented to person, place, and time.   Psychiatric:         Mood and Affect: Mood normal.         Behavior: Behavior normal.         FHT:  Baseline Rate (FHR): 145 bpm  Variability: Moderate  Accelerations: 15 x 15 or greater, At variable times  Decelerations: None    TOCO:   Contraction Frequency (minutes): occas  Contraction Duration (seconds): 50-70  Contraction Intensity: Mild (pt denies ctx)        Eliz Cole MD  OBGYN PGY-1  8/8/2024  12:26 PM

## 2024-08-08 NOTE — TELEPHONE ENCOUNTER
Patient would like to know if she should restart the Ferrous sulfate.  Patient said since she is pregnant she hasn't been eating red meat.  She is going to try and eat red meat.

## 2024-08-12 ENCOUNTER — HOSPITAL ENCOUNTER (OUTPATIENT)
Dept: INFUSION CENTER | Facility: CLINIC | Age: 24
End: 2024-08-12

## 2024-08-16 ENCOUNTER — HOSPITAL ENCOUNTER (OUTPATIENT)
Facility: HOSPITAL | Age: 24
Discharge: HOME | End: 2024-08-16
Attending: OBSTETRICS & GYNECOLOGY | Admitting: OBSTETRICS & GYNECOLOGY
Payer: COMMERCIAL

## 2024-08-16 ENCOUNTER — TELEPHONE (OUTPATIENT)
Age: 24
End: 2024-08-16

## 2024-08-16 ENCOUNTER — NURSE TRIAGE (OUTPATIENT)
Age: 24
End: 2024-08-16

## 2024-08-16 VITALS
RESPIRATION RATE: 18 BRPM | WEIGHT: 190.5 LBS | HEIGHT: 63 IN | TEMPERATURE: 98.6 F | DIASTOLIC BLOOD PRESSURE: 73 MMHG | HEART RATE: 80 BPM | SYSTOLIC BLOOD PRESSURE: 125 MMHG | BODY MASS INDEX: 33.75 KG/M2

## 2024-08-16 DIAGNOSIS — O99.343 DEPRESSION COMPLICATING PREGNANCY IN THIRD TRIMESTER, ANTEPARTUM: Primary | ICD-10-CM

## 2024-08-16 DIAGNOSIS — F32.A DEPRESSION COMPLICATING PREGNANCY IN THIRD TRIMESTER, ANTEPARTUM: Primary | ICD-10-CM

## 2024-08-16 PROCEDURE — 99214 OFFICE O/P EST MOD 30 MIN: CPT | Performed by: OBSTETRICS & GYNECOLOGY

## 2024-08-16 PROCEDURE — 99212 OFFICE O/P EST SF 10 MIN: CPT

## 2024-08-16 NOTE — PROGRESS NOTES
L&D Triage Note - OB/GYN  Mary Jane Reyes 24 y.o. female MRN: [id number]  Unit/Bed#: -01 Encounter: [id number]      ASSESSMENT:    Mary Jane Reyes is a 24 y.o.  at 33w4d presenting with Contraction. Opportunity to run depression and domestic violence screening. EPDS 22. Talked to the pt about to start Zoloft 50mg daily.      PLAN:    1) R/O  labor    - SVE: closed/thick/high  - NST reactive 140bpm  - TOCO no contractions    2) EPDS screening of 22      - start her on zoloft 50 mg po daily   - Referral for [facility] and [facility].     SUBJECTIVE:    Mary Jane Reyes 24 y.o.  at 33w4d with an Estimated Date of Delivery: 24 who presents to triage for contractions that started since yesterday but right now at the triage room she denies contractions.  She otherwise denies leakage of fluid, vaginal bleeding or decreased fetal movement.    - Case discussed with Dr. Danielle    Her current obstetrical history is significant for depression, RH negative, Iron deficiency and hyperemesis gravidarum.    Her past obstetrical history  - None    OBJECTIVE:    Vitals:    24 1305   BP: 125/73   Pulse: 80   Resp: 18   Temp: 98.6 °F (37 °C)       ROS:  Constitutional: Negative  Respiratory: Negative  Cardiovascular: Negative    Gastrointestinal: Negative    General Physical Exam:  General: Well appearing, no distress  Respiratory: Unlabored breathing  Cardiovascular: Regular rate.  Abdomen: Soft, gravid, nontender  Fundal Height: Appropriate for gestational age.  Extremities: Warm and well perfused.  Non tender.      Cervical Exam  Speculum: Cervical os is closed  SVE: closed/thick/high    FETAL ASSESSMENT:  Fetal heart rate:  145 bpm  [place]:  No contractions noted      Sofya Hernandez MD  OBGYN, PGY-1  24  3:02 PM

## 2024-08-16 NOTE — TELEPHONE ENCOUNTER
"Patient calling in stating that she's 33w4d , pt reporting that she has been cramping since yesterday and pt reporting that she has back pain that resolved, and pt reporting pain down her legs and then it resolved but the cramping is still occurring. Pt reporting she might have lost her mucous plug. Pt reporting she keeps getting hot/cold sweats.  Pt reporting +FM. Pt denies leakage of fluid, vaginal bleeding, hand or facial swelling, fevers    Patient reporting that she just arrived at the hospital and will be going to L&D    Patient is advised to go to L&D for further evaluation, pt stated she's there and is currently parking    Epic Secure Chat sent to Dr. Urban on call         Reason for Disposition   Contractions > 10 minutes apart that persist > 24 hours, and no improvement using CARE ADVICE    Answer Assessment - Initial Assessment Questions  1. ONSET: \"When did the symptoms begin?\"         Started yesterday   2. CONTRACTIONS: \"Describe the contractions that you are having.\" (e.g., duration, frequency, regularity, severity)        Pt reporting that she's been having cramping consistently, pt isnt timing it     3. LARRY: \"What date are you expecting to deliver?\"      24  4. PARITY: \"Have you had a baby before?\" If Yes, ask: \"How long did the labor last?\"      First baby   5. FETAL MOVEMENT: \"Has the baby's movement decreased or changed significantly from normal?\"      Pt reporting +FM   6. OTHER SYMPTOMS: \"Do you have any other symptoms?\" (e.g., leaking fluid from vagina, fever, hand/facial swelling)      Pt reporting +FM. Pt denies leakage of fluid, vaginal bleeding, hand or facial swelling, fevers    Protocols used: Pregnancy - Labor - -ADULT-[place]    "

## 2024-08-16 NOTE — DISCHARGE INSTRUCTIONS
Pregnancy at 31 to 34 Weeks   WHAT YOU NEED TO KNOW:   What changes are happening with my body?  You may continue to have symptoms such as shortness of breath, heartburn, contractions, or swelling of your ankles and feet. You may be gaining about 1 pound a week now.   How do I care for myself at this stage of my pregnancy?       Eat a variety of healthy foods.  Healthy foods include fruits, vegetables, whole-grain breads, low-fat dairy foods, beans, lean meats, and fish. Drink liquids as directed. Ask how much liquid to drink each day and which liquids are best for you. Limit caffeine to less than 200 milligrams each day. Limit your intake of fish to 2 servings each week. Choose fish low in mercury such as canned light tuna, shrimp, salmon, cod, or tilapia. Do not  eat fish high in mercury such as swordfish, tilefish, carmen mackerel, and shark.         Manage heartburn  by eating 4 or 5 small meals each day instead of large meals. Avoid spicy food.    Manage swelling  by lying down and putting your feet up.         Take prenatal vitamins as directed.  Your need for certain vitamins and minerals, such as folic acid, increases during pregnancy. Prenatal vitamins provide some of the extra vitamins and minerals you need. Prenatal vitamins may also help to decrease the risk of certain birth defects.         Talk to your healthcare provider about exercise.  Moderate exercise can help you stay fit. Your healthcare provider will help you plan an exercise program that is safe for you during pregnancy.         Do not smoke.  Smoking increases your risk of a miscarriage and other health problems during your pregnancy. Smoking can cause your baby to be born too early or weigh less at birth. Ask your healthcare provider for information if you need help quitting.    Do not drink alcohol.  Alcohol passes from your body to your baby through the placenta. It can affect your baby's brain development and cause fetal alcohol syndrome  (FAS). FAS is a group of conditions that causes mental, behavior, and growth problems.    Talk to your healthcare provider before you take any medicines.  Many medicines may harm your baby if you take them when you are pregnant. Do not take any medicines, vitamins, herbs, or supplements without first talking to your healthcare provider. Never use illegal or street drugs (such as marijuana or cocaine) while you are pregnant.    What are some safety tips during pregnancy?   Avoid hot tubs and saunas.  Do not use a hot tub or sauna while you are pregnant, especially during your first trimester. Hot tubs and saunas may raise your baby's temperature and increase the risk of birth defects.    Avoid toxoplasmosis.  This is an infection caused by eating raw meat or being around infected cat feces. It can cause birth defects, miscarriages, and other problems. Wash your hands after you touch raw meat. Make sure any meat is well-cooked before you eat it. Avoid raw eggs and unpasteurized milk. Use gloves or ask someone else to clean your cat's litter box while you are pregnant.       What changes are happening with my baby?  By 34 weeks, your baby may weigh more than 5 pounds. Your baby will be about 12 ½ inches long from the top of the head to the rump (baby's bottom). Your baby is gaining about ½ pound a week. Your baby's eyes open and close now. Your baby's kicks and movements are more forceful at this time.  What do I need to know about prenatal care?  Your healthcare provider will check your blood pressure and weight. You may also need the following:  A urine test  may also be done to check for sugar and protein. These can be signs of gestational diabetes or infection. Protein in your urine may also be a sign of preeclampsia. Preeclampsia is a condition that can develop during week 20 or later of your pregnancy. It causes high blood pressure, and it can cause problems with your kidneys and other organs.    A gestational  diabetes screen  may be done. Your healthcare provider may order either a 1-step or 2-step oral glucose tolerance test (OGTT).     1-step OGTT:  Your blood sugar level will be tested after you have not eaten for 8 hours (fasting). You will then be given a glucose drink. Your level will be tested again 1 hour and 2 hours after you finish the drink.    2-step OGTT:  You do not have to fast for the first part of the test. You will have the glucose drink at any time of day. Your blood sugar level will be checked 1 hour later. If your blood sugar is higher than a certain level, another test will be ordered. You will fast and your blood sugar level will be tested. You will have the glucose drink. Your blood will be tested again 1 hour, 2 hours, and 3 hours after you finish the glucose drink.    A Tdap vaccine  may be recommended by your healthcare provider.    Fundal height  is a measurement of your uterus to check your baby's growth. This number is usually the same as the number of weeks that you have been pregnant. Your healthcare provider may also check your baby's position.    Your baby's heart rate  will be checked.    When should I seek immediate care?   You develop a severe headache that does not go away.    You have new or increased vision changes, such as blurred or spotted vision.    You have new or increased swelling in your face or hands.    You have vaginal spotting or bleeding.    Your water broke or you feel warm water gushing or trickling from your vagina.    When should I call my obstetrician?   You have more than 5 contractions in 1 hour.    You notice any changes in your baby's movements.    You have abdominal cramps, pressure, or tightening.    You have a change in vaginal discharge.    You have chills or a fever.    You have vaginal itching, burning, or pain.    You have yellow, green, white, or foul-smelling vaginal discharge.    You have pain or burning when you urinate, less urine than usual, or  pink or bloody urine.    You have questions or concerns about your condition or care.    CARE AGREEMENT:   You have the right to help plan your care. Learn about your health condition and how it may be treated. Discuss treatment options with your healthcare providers to decide what care you want to receive. You always have the right to refuse treatment. The above information is an  only. It is not intended as medical advice for individual conditions or treatments. Talk to your doctor, nurse or pharmacist before following any medical regimen to see if it is safe and effective for you.  © Copyright [employer] 2022 Information is for End User's use only and may not be sold, redistributed or otherwise used for commercial purposes. All illustrations and images included in CareNotes® are the copyrighted property of A.D.A[employer] Inc. or [employer]

## 2024-08-16 NOTE — PROGRESS NOTES
"Routine Prenatal Visit  [facility] [address], [place], [place]      OB/GYN Prenatal Visit    ASSESSMENT / PLAN:  1. 30 weeks gestation of pregnancy  2. Rh negative status in grady pregnancy in third trimester  3. Depression complicating pregnancy in third trimester, antepartum  -     buPROPion (Wellbutrin SR) 100 mg 12 hr tablet; Take 1 tablet (100 mg total) by mouth 2 (two) times a day  -     doxylamine (UNISOM) 25 MG tablet; Take 0.5 tablets (12.5 mg total) by mouth daily at bedtime as needed for sleep (Patient not taking: Reported on 2024)  4. Iron deficiency anemia secondary to inadequate dietary iron intake        SUBJECTIVE:  Mary Jane Reyes is a 24 y.o.  at 30 here for prenatal visit.  She has no obstetric complaints and denies pelvic pain, cramping/contractions, vaginal bleeding, loss of fluid, or decreased fetal movement.       The following portions of the patient's history were reviewed and updated as appropriate: allergies, current medications, past family history, past medical history, obstetric history, gynecologic history, past social history, past surgical history and problem list.      Objective:  /74   Ht 5' 3\" (1.6 m)   Wt 83.9 kg (185 lb)   LMP 2023 (Exact Date)   BMI 32.77 kg/m²   Pregravid Weight/BMI: 73.5 kg (162 lb) (BMI 28.70)  Current Weight: 83.9 kg (185 lb)   Total Weight Gain: 10.4 kg (23 lb)   Pre- Vitals      Flowsheet Row Most Recent Value   Prenatal Assessment    Fetal Heart Rate 152   Movement Present   Prenatal Vitals    Blood Pressure 120/74   Weight - Scale 83.9 kg (185 lb)   Urine Albumin/Glucose    Dilation/Effacement/Station    Vaginal Drainage    Draining Fluid No   Edema    LLE Edema None   RLE Edema None   Facial Edema None               Physical Exam:    General: Well appearing, no distress  Respiratory: Unlabored breathing  Cardiovascular: Regular rate.  Abdomen: Soft, gravid, nontender  Fundal " Height: Appropriate for gestational age.  Extremities: Warm and well perfused.  Non tender.      Anson Conti MD

## 2024-08-19 ENCOUNTER — TELEPHONE (OUTPATIENT)
Age: 24
End: 2024-08-19

## 2024-08-19 DIAGNOSIS — Z3A.34 34 WEEKS GESTATION OF PREGNANCY: ICD-10-CM

## 2024-08-19 DIAGNOSIS — A60.00 GENITAL HERPES SIMPLEX, UNSPECIFIED SITE: Primary | ICD-10-CM

## 2024-08-19 RX ORDER — VALACYCLOVIR HYDROCHLORIDE 500 MG/1
500 TABLET, FILM COATED ORAL SEE ADMIN INSTRUCTIONS
Qty: 60 TABLET | Refills: 0 | Status: SHIPPED | OUTPATIENT
Start: 2024-08-19 | End: 2024-08-22

## 2024-08-19 NOTE — TELEPHONE ENCOUNTER
Patient calling to see if provider was able to provide instructions on how to take medication. Unsuccessful warm transfer to [facility]. Please call patient back.

## 2024-08-19 NOTE — TELEPHONE ENCOUNTER
Contacted patient in regards to Routine Referral in attempts to verify patient's needs of services and add patient to proper wait list.     Talk Therapy ;ROF  Medication Mgmt;ROF

## 2024-08-19 NOTE — TELEPHONE ENCOUNTER
Patient called in stating that she was prescribed valtrex by Juli CHAVIRA, and as per pt the medication doesn't have instructions. Pt was notified that the instructions that were written by the provider are:   Sig: Take 1 tablet (500 mg total) by mouth see administration instructions Take 1 tablet twice a day for the next 3 days then daily until delivery     Patient was notified to not take the medication until the directions are clarified. Patient was also notified to contact the pharmacy as medication shouldn't have been dispensed without instructions, Pt verbalized understanding, no further questions at this time        Pt would like a call back with further instructions on how to take the medication

## 2024-08-19 NOTE — TELEPHONE ENCOUNTER
RN placed a call to patient with administration instructions. Pt verbalized an understanding. No further questions.

## 2024-08-21 PROBLEM — Z3A.34 34 WEEKS GESTATION OF PREGNANCY: Status: ACTIVE | Noted: 2024-03-26

## 2024-08-21 NOTE — ASSESSMENT & PLAN NOTE
- reviewed s/s PTL, FKC, perineal massage  -Taking PNV, ferrous sulfate  - GBS in 2 weeks  - next visit 2 weeks

## 2024-08-22 ENCOUNTER — ROUTINE PRENATAL (OUTPATIENT)
Dept: OBGYN CLINIC | Facility: MEDICAL CENTER | Age: 24
End: 2024-08-22
Payer: COMMERCIAL

## 2024-08-22 VITALS — WEIGHT: 197.2 LBS | SYSTOLIC BLOOD PRESSURE: 112 MMHG | DIASTOLIC BLOOD PRESSURE: 74 MMHG | BODY MASS INDEX: 34.93 KG/M2

## 2024-08-22 DIAGNOSIS — Z23 ENCOUNTER FOR IMMUNIZATION: ICD-10-CM

## 2024-08-22 DIAGNOSIS — Z67.91 RH NEGATIVE STATUS IN SINGLETON PREGNANCY IN THIRD TRIMESTER: Primary | ICD-10-CM

## 2024-08-22 DIAGNOSIS — F32.A DEPRESSION COMPLICATING PREGNANCY IN THIRD TRIMESTER, ANTEPARTUM: ICD-10-CM

## 2024-08-22 DIAGNOSIS — A60.00 GENITAL HERPES SIMPLEX, UNSPECIFIED SITE: ICD-10-CM

## 2024-08-22 DIAGNOSIS — O99.343 DEPRESSION COMPLICATING PREGNANCY IN THIRD TRIMESTER, ANTEPARTUM: ICD-10-CM

## 2024-08-22 DIAGNOSIS — O26.893 RH NEGATIVE STATUS IN SINGLETON PREGNANCY IN THIRD TRIMESTER: Primary | ICD-10-CM

## 2024-08-22 DIAGNOSIS — Z3A.34 34 WEEKS GESTATION OF PREGNANCY: ICD-10-CM

## 2024-08-22 DIAGNOSIS — D50.8 IRON DEFICIENCY ANEMIA SECONDARY TO INADEQUATE DIETARY IRON INTAKE: ICD-10-CM

## 2024-08-22 DIAGNOSIS — Z34.93 THIRD TRIMESTER PREGNANCY: ICD-10-CM

## 2024-08-22 PROCEDURE — 90471 IMMUNIZATION ADMIN: CPT | Performed by: ADVANCED PRACTICE MIDWIFE

## 2024-08-22 PROCEDURE — 99213 OFFICE O/P EST LOW 20 MIN: CPT | Performed by: ADVANCED PRACTICE MIDWIFE

## 2024-08-22 PROCEDURE — 90715 TDAP VACCINE 7 YRS/> IM: CPT | Performed by: ADVANCED PRACTICE MIDWIFE

## 2024-08-22 RX ORDER — VALACYCLOVIR HYDROCHLORIDE 500 MG/1
500 TABLET, FILM COATED ORAL 2 TIMES DAILY
Start: 2024-08-22 | End: 2024-10-11

## 2024-08-22 NOTE — PROGRESS NOTES
Routine Prenatal Visit  [facility] [address], [place], [place]    Assessment/Plan:  Mary Jane is a 24 y.o. year old  at 34w3d who presents for routine prenatal visit.     1. Rh negative status in grady pregnancy in third trimester  2. Depression complicating pregnancy in third trimester, antepartum  Assessment & Plan:  Doing well, has good support  3. Genital herpes simplex, unspecified site  Assessment & Plan:  - script for valtrex - taking 500 mg 2 times daily  Orders:  -     valACYclovir (VALTREX) 500 mg tablet; Take 1 tablet (500 mg total) by mouth 2 (two) times a day Take 1 tablet twice a day for the next 3 days then daily until delivery  4. Iron deficiency anemia secondary to inadequate dietary iron intake  5. Third trimester pregnancy  6. 34 weeks gestation of pregnancy  Assessment & Plan:  - reviewed s/s PTL, FKC, perineal massage  -Taking PNV, ferrous sulfate  - GBS in 2 weeks  - next visit 2 weeks  Orders:  -     valACYclovir (VALTREX) 500 mg tablet; Take 1 tablet (500 mg total) by mouth 2 (two) times a day Take 1 tablet twice a day for the next 3 days then daily until delivery        Subjective:     CC: Prenatal care    Mary Jane Reyes is a 24 y.o.  female who presents for routine prenatal care at 34w3d.  Pregnancy ROS: no leakage of fluid, pelvic pain, or vaginal bleeding.  Good fetal movement.    The following portions of the patient's history were reviewed and updated as appropriate: allergies, current medications, past family history, past medical history, obstetric history, gynecologic history, past social history, past surgical history and problem list.      Objective:  /74   Wt 89.4 kg (197 lb 3.2 oz)   LMP 2023 (Exact Date)   BMI 34.93 kg/m²   Pregravid Weight/BMI: 73.5 kg (162 lb) (BMI 28.70)  Current Weight: 89.4 kg (197 lb 3.2 oz)   Total Weight Gain: 16 kg (35 lb 3.2 oz)   Pre-Beny Vitals    Flowsheet Row Most Recent Value    Prenatal Assessment    Fetal Heart Rate 148   Fundal Height (cm) 34 cm   Movement Present   Prenatal Vitals    Blood Pressure 112/74   Weight - Scale 89.4 kg (197 lb 3.2 oz)   Urine Albumin/Glucose    Dilation/Effacement/Station    Vaginal Drainage    Edema    LLE Edema None   RLE Edema None           General: Well appearing, no distress  Respiratory: Unlabored breathing  Cardiovascular: Regular rate.  Abdomen: Soft, gravid, nontender  Fundal Height: Appropriate for gestational age.  Extremities: Warm and well perfused.  Non tender.

## 2024-08-30 ENCOUNTER — TELEPHONE (OUTPATIENT)
Age: 24
End: 2024-08-30

## 2024-08-30 ENCOUNTER — NURSE TRIAGE (OUTPATIENT)
Age: 24
End: 2024-08-30

## 2024-08-30 DIAGNOSIS — L29.9 ITCHING: Primary | ICD-10-CM

## 2024-08-30 NOTE — TELEPHONE ENCOUNTER
"Pt called 35w4d,   reporting itching on the palms of her hands, sides of feet and sides of calves intermittently for the last few days. Denies rash. Has not tried anything to help with itching. Has had mild nausea, ongoing since starting iron supplement. Denies vaginal bleeding, LOF, abnormal discharge. Baby has been active.     ESC sent to On Call Provider Dr. Urban- would recommend labs. persistent itching can be a sign of cholestasis of pregnancy (ehsan in unusual places like palms and soles). if you're able to place for me -- bile acids and CMP please. if not I can place after next pt. may try an antihistamine if has not done yet. any of the plain forms are safe. also would recommend a gentle moisturizer targeted for eczema, as can be soothing on sensitive pregnancy skin (even if no hx eczema! its just a hypoallergenic, very moisturizing formula)     Call back to pt and reviewed recommendations from Dr. Urban. Pt verbalized understanding and is thankful, she will go for labs right away. Pt aware to call back with any worsening symptoms.  Answer Assessment - Initial Assessment Questions  1. DESCRIPTION: \"Describe the itching you are having.\" \"Where is it located?\"      Palms of hands, sides of feet, sides of calves  2. SEVERITY: \"How bad is it?\"     - MILD - doesn't interfere with normal activities    - MODERATE-SEVERE: interferes with work, school, sleep, or other activities       7/10  3. SCRATCHING: \"Are there any scratch marks? Bleeding?\"      denies  4. ONSET: \"When did the itching begin?\"       A few days ago  5. CAUSE: \"What do you think is causing the itching?\"       unsure  6. OTHER SYMPTOMS: \"Do you have any other symptoms?\"       denies  7. PREGNANCY: \"Is there any chance you are pregnant?\" \"When was your last menstrual period?\"      35w4d    Protocols used: Itching - Localized-ADULT-[place]    "

## 2024-08-30 NOTE — TELEPHONE ENCOUNTER
Spoke with patient who is to get a CMP and bile acids done.  She wanted to know if it was a fasting test.  She was advised the bile acids are fasting.  Appointment made for her for tomorrow at the Lab.  No further questions or concerns at this time.

## 2024-08-31 ENCOUNTER — NURSE TRIAGE (OUTPATIENT)
Dept: OTHER | Facility: OTHER | Age: 24
End: 2024-08-31

## 2024-08-31 ENCOUNTER — APPOINTMENT (OUTPATIENT)
Dept: LAB | Facility: MEDICAL CENTER | Age: 24
End: 2024-08-31
Payer: COMMERCIAL

## 2024-08-31 DIAGNOSIS — L29.9 ITCHING: ICD-10-CM

## 2024-08-31 DIAGNOSIS — E03.9 HYPOTHYROIDISM AFFECTING PREGNANCY IN THIRD TRIMESTER: ICD-10-CM

## 2024-08-31 DIAGNOSIS — O99.283 HYPOTHYROIDISM AFFECTING PREGNANCY IN THIRD TRIMESTER: ICD-10-CM

## 2024-08-31 DIAGNOSIS — O99.013 ANEMIA DURING PREGNANCY IN THIRD TRIMESTER: ICD-10-CM

## 2024-08-31 LAB
ALBUMIN SERPL BCG-MCNC: 3.1 G/DL (ref 3.5–5)
ALP SERPL-CCNC: 155 U/L (ref 34–104)
ALT SERPL W P-5'-P-CCNC: 15 U/L (ref 7–52)
ANION GAP SERPL CALCULATED.3IONS-SCNC: 5 MMOL/L (ref 4–13)
AST SERPL W P-5'-P-CCNC: 17 U/L (ref 13–39)
BILIRUB SERPL-MCNC: 0.25 MG/DL (ref 0.2–1)
BUN SERPL-MCNC: 4 MG/DL (ref 5–25)
CALCIUM ALBUM COR SERPL-MCNC: 9.2 MG/DL (ref 8.3–10.1)
CALCIUM SERPL-MCNC: 8.5 MG/DL (ref 8.4–10.2)
CHLORIDE SERPL-SCNC: 105 MMOL/L (ref 96–108)
CO2 SERPL-SCNC: 24 MMOL/L (ref 21–32)
CREAT SERPL-MCNC: 0.42 MG/DL (ref 0.6–1.3)
ERYTHROCYTE [DISTWIDTH] IN BLOOD BY AUTOMATED COUNT: 16.3 % (ref 11.6–15.1)
FERRITIN SERPL-MCNC: 11 NG/ML (ref 11–307)
GFR SERPL CREATININE-BSD FRML MDRD: 143 ML/MIN/1.73SQ M
GLUCOSE P FAST SERPL-MCNC: 78 MG/DL (ref 65–99)
HCT VFR BLD AUTO: 31.4 % (ref 34.8–46.1)
HGB BLD-MCNC: 9.7 G/DL (ref 11.5–15.4)
MCH RBC QN AUTO: 29 PG (ref 26.8–34.3)
MCHC RBC AUTO-ENTMCNC: 30.9 G/DL (ref 31.4–37.4)
MCV RBC AUTO: 94 FL (ref 82–98)
PLATELET # BLD AUTO: 512 THOUSANDS/UL (ref 149–390)
PMV BLD AUTO: 10.8 FL (ref 8.9–12.7)
POTASSIUM SERPL-SCNC: 4.1 MMOL/L (ref 3.5–5.3)
PROT SERPL-MCNC: 6.4 G/DL (ref 6.4–8.4)
RBC # BLD AUTO: 3.34 MILLION/UL (ref 3.81–5.12)
SODIUM SERPL-SCNC: 134 MMOL/L (ref 135–147)
T4 FREE SERPL-MCNC: 0.53 NG/DL (ref 0.61–1.12)
TSH SERPL DL<=0.05 MIU/L-ACNC: 0.9 UIU/ML (ref 0.45–4.5)
WBC # BLD AUTO: 16.88 THOUSAND/UL (ref 4.31–10.16)

## 2024-08-31 PROCEDURE — 82728 ASSAY OF FERRITIN: CPT

## 2024-08-31 PROCEDURE — 84443 ASSAY THYROID STIM HORMONE: CPT

## 2024-08-31 PROCEDURE — 84439 ASSAY OF FREE THYROXINE: CPT

## 2024-08-31 PROCEDURE — 80053 COMPREHEN METABOLIC PANEL: CPT

## 2024-08-31 PROCEDURE — 36415 COLL VENOUS BLD VENIPUNCTURE: CPT

## 2024-08-31 PROCEDURE — 82240 BILE ACIDS CHOLYLGLYCINE: CPT

## 2024-08-31 PROCEDURE — 85027 COMPLETE CBC AUTOMATED: CPT

## 2024-08-31 NOTE — TELEPHONE ENCOUNTER
On call provider contacted, stated there was nothing urgent to do at this point regarding her lab work. Patient made aware. Instructed patient to follow up once she see her Bile Acid level come back if she has any concerns. Patient verbalized understanding, no further assistance needed at this time.   Reason for Disposition  • Caller requesting lab results    Protocols used: PCP Call - No Triage-ADULT-

## 2024-08-31 NOTE — TELEPHONE ENCOUNTER
"Regardinw pregnant / worried about lab results  ----- Message from Lala ZAZUETA sent at 2024  1:03 PM EDT -----  \" I am 35w pregnant, I had bloodwork today because I have really itchy hands and feet. I just saw the results and they don't look right. \"    "

## 2024-08-31 NOTE — TELEPHONE ENCOUNTER
"Answer Assessment - Initial Assessment Questions  1. REASON FOR CALL or QUESTION: \"What is your reason for calling today?\" or \"How can I best  help you?\" or \"What question do you have that I can help answer?\"      Following up on lab work that was completed today- concerned with levels coming back abnormal    2. CALLER: Document the source of call. (e.g., laboratory, patient).      Patient    Protocols used: PCP Call - No Triage-ADULT-    "

## 2024-09-02 ENCOUNTER — HOSPITAL ENCOUNTER (OUTPATIENT)
Facility: HOSPITAL | Age: 24
Discharge: HOME | End: 2024-09-02
Attending: OBSTETRICS & GYNECOLOGY | Admitting: OBSTETRICS & GYNECOLOGY
Payer: COMMERCIAL

## 2024-09-02 VITALS — RESPIRATION RATE: 18 BRPM | SYSTOLIC BLOOD PRESSURE: 114 MMHG | HEART RATE: 89 BPM | DIASTOLIC BLOOD PRESSURE: 62 MMHG

## 2024-09-02 PROBLEM — Z3A.36 36 WEEKS GESTATION OF PREGNANCY: Status: ACTIVE | Noted: 2024-03-26

## 2024-09-02 PROCEDURE — 99213 OFFICE O/P EST LOW 20 MIN: CPT | Performed by: OBSTETRICS & GYNECOLOGY

## 2024-09-02 PROCEDURE — 99212 OFFICE O/P EST SF 10 MIN: CPT

## 2024-09-02 PROCEDURE — 59025 FETAL NON-STRESS TEST: CPT | Performed by: OBSTETRICS & GYNECOLOGY

## 2024-09-02 NOTE — PROGRESS NOTES
Triage Note - OB  Mary Jane Reyes 24 y.o. female MRN: [id number]  Unit/Bed#:  TRIAGE 1-01 Encounter: [id number]    OB TRIAGE NOTE  Mary Jane Reyes  [id number]  2024  12:03 PM  LD TRIAGE 1/LD TRIAGE 1-*    ASSESS:  24 y.o.  36w0d with abdominal pain    PLAN  #1. Abdominal pain:   SVE closed/thick/high, unchanged from previous exam   NST reactive, rare, irregular contractions  Discussed with patient that her symptoms are likely musculoskeletal, recommend rest, hydration    Discharge instructions  Patient instructed to call if experiencing worsening contractions, vaginal bleeding, loss of fluid or decreased fetal movement.  Will follow up with OBGYN on 24.    ______________    SUBJECTIVE    LARRY: Estimated Date of Delivery: 24    HPI Chronology:  24 y.o.  36w0d presents with complaint of abdominal pain. She describes right periumbilical pain this morning that is now improved. She also describes groin pain that makes it difficult to ambulate. She has also had episodes of diarrhea and nausea. She denies any bleeding or abnormal discharge. She reports normal fetal movement.     Vitals:   /62 (BP Location: Left arm)   Pulse 89   Resp 18   LMP 2023 (Exact Date)   There is no height or weight on file to calculate BMI.    Physical Exam  Constitutional:       Appearance: Normal appearance.   HENT:      Head: Normocephalic and atraumatic.   Cardiovascular:      Rate and Rhythm: Normal rate.   Pulmonary:      Effort: Pulmonary effort is normal. No respiratory distress.   Abdominal:      Palpations: Abdomen is soft.      Tenderness: There is no abdominal tenderness. There is no guarding or rebound.   Genitourinary:     General: Normal vulva.      Vagina: No vaginal discharge.      Comments: SVE closed/thick/high  Musculoskeletal:         General: Normal range of motion.   Skin:     General: Skin is warm and dry.   Neurological:      Mental Status: She is alert.          FHT:  Baseline Rate (FHR): 150 bpm  Variability: Moderate  Accelerations: 15 x 15 or greater  Decelerations: None  FHR Category: Category I  TOCO:   Contraction Frequency (minutes): 10  Contraction Duration (seconds): 45  Contraction Intensity: Mild    Labs: No results found for this or any previous visit (from the past 24 hour(s)).    Lab, Imaging and other studies: I have personally reviewed pertinent reports.        Duyen Luciano MD  OB/GYN   9/2/2024  12:03 PM

## 2024-09-05 LAB — BILE AC SERPL-SCNC: <1 UMOL/L (ref 0–10)

## 2024-09-06 ENCOUNTER — OFFICE VISIT (OUTPATIENT)
Dept: OBGYN CLINIC | Facility: MEDICAL CENTER | Age: 24
End: 2024-09-06
Payer: COMMERCIAL

## 2024-09-06 VITALS
DIASTOLIC BLOOD PRESSURE: 60 MMHG | BODY MASS INDEX: 35.54 KG/M2 | HEIGHT: 63 IN | WEIGHT: 200.6 LBS | SYSTOLIC BLOOD PRESSURE: 112 MMHG

## 2024-09-06 DIAGNOSIS — O26.893 RH NEGATIVE STATUS IN SINGLETON PREGNANCY IN THIRD TRIMESTER: ICD-10-CM

## 2024-09-06 DIAGNOSIS — D50.8 IRON DEFICIENCY ANEMIA SECONDARY TO INADEQUATE DIETARY IRON INTAKE: ICD-10-CM

## 2024-09-06 DIAGNOSIS — O99.343 DEPRESSION COMPLICATING PREGNANCY IN THIRD TRIMESTER, ANTEPARTUM: Primary | ICD-10-CM

## 2024-09-06 DIAGNOSIS — F32.A DEPRESSION COMPLICATING PREGNANCY IN THIRD TRIMESTER, ANTEPARTUM: Primary | ICD-10-CM

## 2024-09-06 DIAGNOSIS — Z3A.36 36 WEEKS GESTATION OF PREGNANCY: ICD-10-CM

## 2024-09-06 DIAGNOSIS — Z67.91 RH NEGATIVE STATUS IN SINGLETON PREGNANCY IN THIRD TRIMESTER: ICD-10-CM

## 2024-09-06 DIAGNOSIS — O21.0 HYPEREMESIS GRAVIDARUM: ICD-10-CM

## 2024-09-06 PROCEDURE — 87184 SC STD DISK METHOD PER PLATE: CPT | Performed by: STUDENT IN AN ORGANIZED HEALTH CARE EDUCATION/TRAINING PROGRAM

## 2024-09-06 PROCEDURE — 99214 OFFICE O/P EST MOD 30 MIN: CPT | Performed by: STUDENT IN AN ORGANIZED HEALTH CARE EDUCATION/TRAINING PROGRAM

## 2024-09-06 PROCEDURE — 87150 DNA/RNA AMPLIFIED PROBE: CPT | Performed by: STUDENT IN AN ORGANIZED HEALTH CARE EDUCATION/TRAINING PROGRAM

## 2024-09-06 NOTE — PROGRESS NOTES
"Routine Prenatal Visit  [facility] [address], [place], [place]    Assessment/Plan:  Mary Jane is a 24 y.o. year old  at 36w4d who presents for routine prenatal visit.     1. Depression complicating pregnancy in third trimester, antepartum  2. Hyperemesis gravidarum  3. Rh negative status in grady pregnancy in third trimester  4. Iron deficiency anemia secondary to inadequate dietary iron intake  5. 36 weeks gestation of pregnancy  -     Strep B DNA probe, amplification        Subjective:     CC: Prenatal care    Mary Jane Reyes is a 24 y.o.  female who presents for routine prenatal care at 36w4d.  Pregnancy ROS: Denies leakage of fluid, pelvic pain, or vaginal bleeding.  Reports normal fetal movement.    The following portions of the patient's history were reviewed and updated as appropriate: allergies, current medications, past family history, past medical history, obstetric history, gynecologic history, past social history, past surgical history and problem list.      Objective:  /60   Ht 5' 3\" (1.6 m)   Wt 91 kg (200 lb 9.6 oz)   LMP 2023 (Exact Date)   Breastfeeding Unknown   BMI 35.53 kg/m²   Pregravid Weight/BMI: 73.5 kg (162 lb) (BMI 28.70)  Current Weight: 91 kg (200 lb 9.6 oz)   Total Weight Gain: 17.5 kg (38 lb 9.6 oz)   Pre- Vitals      Flowsheet Row Most Recent Value   Prenatal Assessment    Fetal Heart Rate 134   Movement Present   Prenatal Vitals    Blood Pressure 112/60   Weight - Scale 91 kg (200 lb 9.6 oz)   Urine Albumin/Glucose    Dilation/Effacement/Station    Vaginal Drainage    Draining Fluid No   Edema    LLE Edema None   RLE Edema None             General: Well appearing, no distress  Respiratory: Unlabored breathing  Cardiovascular: Regular rate.  Abdomen: Soft, gravid, nontender  Fundal Height: Appropriate for gestational age.  Extremities: Warm and well perfused.  Non tender.  "

## 2024-09-08 LAB — GP B STREP DNA SPEC QL NAA+PROBE: POSITIVE

## 2024-09-09 ENCOUNTER — TELEPHONE (OUTPATIENT)
Age: 24
End: 2024-09-09

## 2024-09-09 PROBLEM — Z3A.37 37 WEEKS GESTATION OF PREGNANCY: Status: ACTIVE | Noted: 2024-03-26

## 2024-09-09 NOTE — TELEPHONE ENCOUNTER
Pt is 37 weeks pregnant calling with concerns for positive group B strep result. RN advised there are additional testing in process which is likely why provider did not review results yet. Advised that for pregnant women who are positive for group B strep - no concern until delivery time, at which point they will give her antibiotics via IV during labor/induction which clears bacteria from the vagina and therefore baby is not exposed to it during delivery. Otherwise, women who have this bacteria in vagina are not affected by it and neither does pregnancy. However, RN will route to provider requesting to review results and advise additional recommendations.     Pt also asking if she should get another bottle of prenatal vitamins since she only has a few more weeks to go. RN advised prenatal vitamins is essential throughout entire pregnancy as well as in the postpartum period for healing and wellness, as well as if patient plans to breastfeed. Advised pt should obtain at least a small bottle to hold her over until she delivers. Pt verbalized an understanding. No further questions.

## 2024-09-10 DIAGNOSIS — O99.343 DEPRESSION COMPLICATING PREGNANCY IN THIRD TRIMESTER, ANTEPARTUM: ICD-10-CM

## 2024-09-10 DIAGNOSIS — F32.A DEPRESSION COMPLICATING PREGNANCY IN THIRD TRIMESTER, ANTEPARTUM: ICD-10-CM

## 2024-09-12 ENCOUNTER — ROUTINE PRENATAL (OUTPATIENT)
Dept: OBGYN CLINIC | Facility: MEDICAL CENTER | Age: 24
End: 2024-09-12
Payer: COMMERCIAL

## 2024-09-12 VITALS — SYSTOLIC BLOOD PRESSURE: 124 MMHG | BODY MASS INDEX: 36.14 KG/M2 | DIASTOLIC BLOOD PRESSURE: 78 MMHG | WEIGHT: 204 LBS

## 2024-09-12 DIAGNOSIS — O99.343 BIPOLAR DISEASE DURING PREGNANCY IN THIRD TRIMESTER (HCC): ICD-10-CM

## 2024-09-12 DIAGNOSIS — O99.343 DEPRESSION COMPLICATING PREGNANCY IN THIRD TRIMESTER, ANTEPARTUM: ICD-10-CM

## 2024-09-12 DIAGNOSIS — F41.9 ANXIETY DURING PREGNANCY: Primary | ICD-10-CM

## 2024-09-12 DIAGNOSIS — O99.340 ANXIETY DURING PREGNANCY: Primary | ICD-10-CM

## 2024-09-12 DIAGNOSIS — F31.9 BIPOLAR DISEASE DURING PREGNANCY IN THIRD TRIMESTER (HCC): ICD-10-CM

## 2024-09-12 DIAGNOSIS — O26.893 RH NEGATIVE STATUS IN SINGLETON PREGNANCY IN THIRD TRIMESTER: ICD-10-CM

## 2024-09-12 DIAGNOSIS — Z3A.37 37 WEEKS GESTATION OF PREGNANCY: ICD-10-CM

## 2024-09-12 DIAGNOSIS — F32.A DEPRESSION COMPLICATING PREGNANCY IN THIRD TRIMESTER, ANTEPARTUM: ICD-10-CM

## 2024-09-12 DIAGNOSIS — Z67.91 RH NEGATIVE STATUS IN SINGLETON PREGNANCY IN THIRD TRIMESTER: ICD-10-CM

## 2024-09-12 PROCEDURE — 99213 OFFICE O/P EST LOW 20 MIN: CPT | Performed by: NURSE PRACTITIONER

## 2024-09-12 NOTE — PROGRESS NOTES
Denies loss of fluid, vaginal bleeding and abdominal pain.  Is having irregular occasional contractions.  Confirms frequent fetal movement.  Tolerating prenatal vitamin, iron and Valtrex well.  Stopped taking Zoloft.  Is doing okay.  Continues to use medical marijuana about 2 times per day.  GBS positive will need treatment in labor reviewed with patient  BP: 124/78 weight: +42lbs SVE FT/th/-3; tolerated well  Plan  -Continue prenatal vitamin daily  -Continue fetal kick count  -Continue vaginal/perineal massage 1-4 times per week  -HSV continue Valtrex twice daily.  No active lesions  -Anemia in pregnancy continue iron as ordered  -GBS positive will need treatment penicillin allergy  -Encourage marijuana cessation.  -Common discomforts of pregnancy and precautions reviewed.  Signs and symptoms of labor reviewed.  RTO 1 week

## 2024-09-13 ENCOUNTER — NURSE TRIAGE (OUTPATIENT)
Age: 24
End: 2024-09-13

## 2024-09-13 LAB
GP B STREP DNA SPEC QL NAA+PROBE: ABNORMAL
GP B STREP DNA SPEC QL NAA+PROBE: ABNORMAL

## 2024-09-13 NOTE — TELEPHONE ENCOUNTER
"Spoke with patient who 37w4d, . She reports she is unsure if her water broke.  She noted a wet spot in her underwear last night but it didn't go through to her clothes. She does not need to wear a pad currently.  She also notes irregular contractions for the last 2 days.  She also noted 2 drops of blood in her underwear this morning.  She reports good fetal movement. She was advised to lay down for 15 mins and see if she has any fluid coming out of her vaginal when getting up, if so she was advised to call back.  She was advised to continue to monitor contractions and when consistent and regular less than 10 mins apart to call back.   She was also advised to call back if any decreased fetal movement or vaginal bleeding.      SEC to on call provider for any additional recommendations.     Reason for Disposition   Possible incontinence of urine, BUT patient uncertain    Answer Assessment - Initial Assessment Questions  1. ONSET: \"When did the symptoms begin?\"         Last night noted a wet spot on underwear  2. CONTRACTIONS: \"Are you having any contractions?\" If yes, ask: \"Describe the contractions that you are having.\" (e.g., duration, frequency, regularity, severity)      For the last 2 days, irregular few mins apart to 20 mins, will start and stop.  3. LARRY: \"What date are you expecting to deliver?\"      24  4. PARITY: \"Have you had a baby before?\" If Yes, ask: \"How long did the labor last?\"      no  5. FETAL MOVEMENT: \"Has the baby's movement decreased or changed significantly from normal?\"      Normal movement.  6. OTHER SYMPTOMS: \"Do you have any other symptoms?\" (e.g., abdominal pain, vaginal bleeding, fever, hand/facial swelling)      Noted 2 tiny drops of blood.    Protocols used: Pregnancy - Rupture of Membranes-ADULT-[place]    "

## 2024-09-13 NOTE — TELEPHONE ENCOUNTER
Pt called back in 37w4d - stating she was continuing to monitor but noticed a few wet spots in her underwear when she stood up to use the bathroom. Denies any vaginal bleeding. States that she is still having the irregular contractions, they come about every 10 minutes. Sometimes they only last a few seconds but sometimes will last up to 10 minutes. Doesn't always cause pain but sometimes will have increased pressure and period like cramping. Baby is active. Pt is nervous about possible ROM. Advised pt to go to L&D to check for ROM. Pt verbalized understanding and is in agreement.     [facility] sent to On Call Provider GORDO Gauthier and Quyen L&D Charge RN.

## 2024-09-16 ENCOUNTER — HOSPITAL ENCOUNTER (OUTPATIENT)
Facility: HOSPITAL | Age: 24
Discharge: HOME | End: 2024-09-16
Attending: OBSTETRICS & GYNECOLOGY | Admitting: OBSTETRICS & GYNECOLOGY
Payer: COMMERCIAL

## 2024-09-16 VITALS
HEART RATE: 101 BPM | TEMPERATURE: 99.6 F | WEIGHT: 204 LBS | SYSTOLIC BLOOD PRESSURE: 132 MMHG | HEIGHT: 63 IN | BODY MASS INDEX: 36.14 KG/M2 | DIASTOLIC BLOOD PRESSURE: 89 MMHG | OXYGEN SATURATION: 98 % | RESPIRATION RATE: 22 BRPM

## 2024-09-16 DIAGNOSIS — F41.9 ANXIETY: Primary | ICD-10-CM

## 2024-09-16 PROBLEM — O36.8190 DECREASED FETAL MOVEMENT: Status: ACTIVE | Noted: 2024-09-16

## 2024-09-16 PROCEDURE — 99212 OFFICE O/P EST SF 10 MIN: CPT

## 2024-09-16 PROCEDURE — 99214 OFFICE O/P EST MOD 30 MIN: CPT | Performed by: OBSTETRICS & GYNECOLOGY

## 2024-09-16 PROCEDURE — 76815 OB US LIMITED FETUS(S): CPT

## 2024-09-16 PROCEDURE — NC001 PR NO CHARGE: Performed by: OBSTETRICS & GYNECOLOGY

## 2024-09-16 RX ORDER — HYDROXYZINE HYDROCHLORIDE 10 MG/1
10 TABLET, FILM COATED ORAL EVERY 6 HOURS PRN
Qty: 30 TABLET | Refills: 0 | Status: SHIPPED | OUTPATIENT
Start: 2024-09-16

## 2024-09-16 NOTE — PROCEDURES
Mary Jane Reyes, a  at 38w0d with an LARRY of 2024, by Last Menstrual Period, was seen at [facility] LABOR AND DELIVERY for the following procedure(s): $Procedure Type: WILFRED]         4 Quadrant WILFRED  WILFRED Q1 (cm): 1.5 cm  WILFRED Q2 (cm): 2.4 cm  WILFRED Q3 (cm): 5.9 cm  WILFRED Q4 (cm): 1.1 cm  WILFRED TOTAL (cm): 10.9 cm

## 2024-09-16 NOTE — PROGRESS NOTES
L&D Triage Note - OB/GYN  Mary Jane Reyes 24 y.o. female MRN: [id number]  Unit/Bed#:  TRIAGE  Encounter: [id number]    Patient is seen by [facility].    ASSESSMENT  Mary Jane Reyes is a 24 y.o.  at 38w0d presenting for loss of fluid and decreased fetal movement. Speculum exam did not demonstrate pooling. Nitrazine test was negative. NST was reactive. WILFRED was WNL at 10.9. Good fetal movement was seen on TAUS. SVE was 0.  5, thick, and high. Patient safe for discharge at this time. Patient given strict return precautions.     PLAN  #1. Loss of fluid  Speculum exam was negative for pooling.   No ferning, clue cells, hyphae, or trichomonads were noticed on microscopy  NST was reactive  WILFRED wnl at 10.9.    #2. Decreased fetal movement  NST reactive  WILFRED wnl at 10.9.  Good fetal movement noticed on TAUS.    #3. Anxiety  Patient with history of anxiety  Will be given script for Atarax.      Discharge instructions  Patient instructed to call if experiencing worsening contractions, vaginal bleeding, loss of fluid or decreased fetal movement.  Will follow up with OBGYN on 24.    D/w Dr. Conti.  ______________    SUBJECTIVE    LARRY: Estimated Date of Delivery: 24    HPI:  24 y.o.  38w0d presents with complaint of loss of fluid and decreased fetal movement. The patient called the nurse triage line on 24 for concern for loss of fluid. The patient notes that she had a small amount of fluid on her underwear. The patient notes that she may have lost her mucus plug around the same time. The patient states that she had a few strong contractions at that time, but her contractions have been very spaced out. The patient was not seen on the  after the loss of fluid. The patient notes that she has felt slightly less movement than normal today from baby which prompted her to be evaulated. The patient denies any headache, vision changes, chest pain, shortness of breath, abdominal pain, swelling in her  "legs.    Contractions: Endorses, irregular, very long intervals between  Leakage of fluid: Endorses on 09/13/24  Vaginal Bleeding: Denies  Fetal movement: Decreased    Her obstetrical history is significant for hyperemesis gravidarum, Rh negative status in pregnancy, iron deficiency anemia, GBS positive.    ROS:  Constitutional: Negative  Respiratory: Negative  Cardiovascular: Negative    Gastrointestinal: Negative    OBJECTIVE:    Vitals:  /89 (BP Location: Right arm)   Pulse 101   Temp 99.6 °F (37.6 °C) (Oral)   Resp 22   Ht 5' 3\" (1.6 m)   Wt 92.5 kg (204 lb)   LMP 12/25/2023 (Exact Date)   SpO2 98%   BMI 36.14 kg/m²   Body mass index is 36.14 kg/m².    Physical Exam  Vitals and nursing note reviewed.   Constitutional:       General: She is not in acute distress.     Appearance: She is well-developed.   HENT:      Head: Normocephalic and atraumatic.   Eyes:      Conjunctiva/sclera: Conjunctivae normal.   Cardiovascular:      Rate and Rhythm: Normal rate and regular rhythm.      Heart sounds: No murmur heard.  Pulmonary:      Effort: Pulmonary effort is normal. No respiratory distress.      Breath sounds: Normal breath sounds.   Abdominal:      Palpations: Abdomen is soft.      Tenderness: There is no abdominal tenderness.   Musculoskeletal:         General: No swelling.      Cervical back: Neck supple.   Skin:     General: Skin is warm and dry.      Capillary Refill: Capillary refill takes less than 2 seconds.   Neurological:      Mental Status: She is alert.   Psychiatric:         Mood and Affect: Mood normal.         Speculum exam:  Normal external female genitalia  Cervix fully visualized and not visibly dilated  Physiologic discharge  No bleeding, pooling, abnormal discharge, or lesions noted      Microscopy:     Infection:   - No clue cells    - No hyphae   - No trichomonads present    Membrane status   - No ferning   - Nitrazene negative   - No pooling     SVE: 0.5/50/-3 performed by " Elsie.       FHT: Reactive  Baseline Rate (FHR): 160 bpm  Variability: Moderate  Accelerations: 15 x 15 or greater, At variable times  Decelerations: None    TOCO:   Contraction Frequency (minutes): x1  Contraction Duration (seconds): 60  Contraction Intensity: Mild    IMAGING:      TAUS   WILFRED      - Q1 1.5 cm     - Q2 2.4 cm     - Q3 5.9 cm     - Q4 1.1 cm     - Total: 10.9 cm   Placenta: Anterior   Presentation: Vertex    Labs: No results found for this or any previous visit (from the past 24 hour(s)).        Nacho Bergman DO  9/16/2024  7:31 PM

## 2024-09-20 ENCOUNTER — TELEPHONE (OUTPATIENT)
Age: 24
End: 2024-09-20

## 2024-09-20 ENCOUNTER — ROUTINE PRENATAL (OUTPATIENT)
Dept: OBGYN CLINIC | Facility: MEDICAL CENTER | Age: 24
End: 2024-09-20
Payer: COMMERCIAL

## 2024-09-20 VITALS — SYSTOLIC BLOOD PRESSURE: 120 MMHG | DIASTOLIC BLOOD PRESSURE: 74 MMHG | BODY MASS INDEX: 37.18 KG/M2 | WEIGHT: 209.9 LBS

## 2024-09-20 DIAGNOSIS — O99.343 BIPOLAR DISEASE DURING PREGNANCY IN THIRD TRIMESTER (HCC): Primary | ICD-10-CM

## 2024-09-20 DIAGNOSIS — Z67.91 RH NEGATIVE STATUS IN SINGLETON PREGNANCY IN THIRD TRIMESTER: ICD-10-CM

## 2024-09-20 DIAGNOSIS — F31.9 BIPOLAR DISEASE DURING PREGNANCY IN THIRD TRIMESTER (HCC): Primary | ICD-10-CM

## 2024-09-20 DIAGNOSIS — O26.893 RH NEGATIVE STATUS IN SINGLETON PREGNANCY IN THIRD TRIMESTER: ICD-10-CM

## 2024-09-20 DIAGNOSIS — Z3A.38 38 WEEKS GESTATION OF PREGNANCY: ICD-10-CM

## 2024-09-20 PROCEDURE — 99213 OFFICE O/P EST LOW 20 MIN: CPT | Performed by: NURSE PRACTITIONER

## 2024-09-20 RX ORDER — LEVOTHYROXINE SODIUM 25 UG/1
25 TABLET ORAL DAILY
Status: ON HOLD | COMMUNITY
Start: 2024-07-06 | End: 2024-09-24 | Stop reason: ALTCHOICE

## 2024-09-20 RX ORDER — BUPROPION HYDROCHLORIDE 100 MG/1
100 TABLET, EXTENDED RELEASE ORAL 2 TIMES DAILY
Status: ON HOLD | COMMUNITY
Start: 2024-07-26 | End: 2024-09-24 | Stop reason: ALTCHOICE

## 2024-09-20 RX ORDER — CEPHALEXIN 500 MG/1
CAPSULE ORAL
Status: ON HOLD | COMMUNITY
Start: 2024-09-15 | End: 2024-09-24 | Stop reason: ALTCHOICE

## 2024-09-20 NOTE — PROGRESS NOTES
Denies loss of fluid, vaginal bleeding and abdominal pain.  Confirms fetal movement.  Doing fetal kick counts.  Tolerating prenatal vitamin, iron and Valtrex well.  Is not taking Zoloft, Wellbutrin or hydroxyzine.  Continues to use medical marijuana approximately 2 times per day.  Is interested in elective induction.  BP: 120/74 weight: +47lbs  Plan   -continue prenatal vitamin daily  -Continue fetal kick counts daily  -Continue vaginal/perineal massage 1-4 times per week  -HSV positive continue Valtrex twice daily.  -Anemia in pregnancy continue iron as ordered  -Encouraged marijuana cessation  -GBS positive will need treatment in labor-penicillin allergy  -Message sent to nurse navigator regarding induction of labor  -Common discomforts of pregnancy and precautions reviewed.  Signs and symptoms of labor reviewed  RTO 1 weeks

## 2024-09-20 NOTE — TELEPHONE ENCOUNTER
Spoke w/pt in regards to scheduled IOL.  Scheduled at [facility] L/D 9/24/24 at 8pm. Pt aware and agreeable

## 2024-09-23 ENCOUNTER — TELEPHONE (OUTPATIENT)
Dept: OBGYN CLINIC | Facility: MEDICAL CENTER | Age: 24
End: 2024-09-23

## 2024-09-24 ENCOUNTER — HOSPITAL ENCOUNTER (OUTPATIENT)
Dept: LABOR AND DELIVERY | Facility: HOSPITAL | Age: 24
Discharge: HOME | DRG: 560 | End: 2024-09-24
Payer: COMMERCIAL

## 2024-09-24 ENCOUNTER — HOSPITAL ENCOUNTER (INPATIENT)
Facility: HOSPITAL | Age: 24
LOS: 3 days | Discharge: HOME | DRG: 560 | End: 2024-09-27
Attending: OBSTETRICS & GYNECOLOGY | Admitting: OBSTETRICS & GYNECOLOGY
Payer: COMMERCIAL

## 2024-09-24 DIAGNOSIS — Z3A.38 38 WEEKS GESTATION OF PREGNANCY: Primary | ICD-10-CM

## 2024-09-24 PROBLEM — O36.8190 DECREASED FETAL MOVEMENT: Status: RESOLVED | Noted: 2024-09-16 | Resolved: 2024-09-24

## 2024-09-24 PROBLEM — Z79.899 MEDICAL MARIJUANA USE: Status: ACTIVE | Noted: 2024-09-24

## 2024-09-24 PROBLEM — O36.8390 ABNORMAL FETAL HEART RATE AFFECTING PREGNANCY: Status: RESOLVED | Noted: 2024-08-08 | Resolved: 2024-09-24

## 2024-09-24 PROBLEM — Z34.90 ENCOUNTER FOR ELECTIVE INDUCTION OF LABOR: Status: ACTIVE | Noted: 2024-09-24

## 2024-09-24 LAB
ABO GROUP BLD: NORMAL
BLD GP AB SCN SERPL QL: NEGATIVE
ERYTHROCYTE [DISTWIDTH] IN BLOOD BY AUTOMATED COUNT: 16.2 % (ref 11.6–15.1)
HCT VFR BLD AUTO: 29.7 % (ref 34.8–46.1)
HGB BLD-MCNC: 9.6 G/DL (ref 11.5–15.4)
HOLD SPECIMEN: YES
MCH RBC QN AUTO: 28.7 PG (ref 26.8–34.3)
MCHC RBC AUTO-ENTMCNC: 32.3 G/DL (ref 31.4–37.4)
MCV RBC AUTO: 89 FL (ref 82–98)
PLATELET # BLD AUTO: 588 THOUSANDS/UL (ref 149–390)
PMV BLD AUTO: 10.1 FL (ref 8.9–12.7)
RBC # BLD AUTO: 3.34 MILLION/UL (ref 3.81–5.12)
RH BLD: NEGATIVE
SPECIMEN EXPIRATION DATE: NORMAL
WBC # BLD AUTO: 20.69 THOUSAND/UL (ref 4.31–10.16)

## 2024-09-24 PROCEDURE — 85027 COMPLETE CBC AUTOMATED: CPT

## 2024-09-24 PROCEDURE — 86850 RBC ANTIBODY SCREEN: CPT

## 2024-09-24 PROCEDURE — 80053 COMPREHEN METABOLIC PANEL: CPT

## 2024-09-24 PROCEDURE — 86900 BLOOD TYPING SEROLOGIC ABO: CPT

## 2024-09-24 PROCEDURE — 4A1HXCZ MONITORING OF PRODUCTS OF CONCEPTION, CARDIAC RATE, EXTERNAL APPROACH: ICD-10-PCS | Performed by: OBSTETRICS & GYNECOLOGY

## 2024-09-24 PROCEDURE — 86901 BLOOD TYPING SEROLOGIC RH(D): CPT

## 2024-09-24 PROCEDURE — 86780 TREPONEMA PALLIDUM: CPT

## 2024-09-24 RX ORDER — ONDANSETRON 2 MG/ML
4 INJECTION INTRAMUSCULAR; INTRAVENOUS EVERY 6 HOURS PRN
Status: DISCONTINUED | OUTPATIENT
Start: 2024-09-24 | End: 2024-09-25

## 2024-09-24 RX ORDER — HYDROXYZINE HYDROCHLORIDE 25 MG/1
25 TABLET, FILM COATED ORAL EVERY 6 HOURS PRN
Status: DISCONTINUED | OUTPATIENT
Start: 2024-09-24 | End: 2024-09-25

## 2024-09-24 RX ORDER — ACETAMINOPHEN 325 MG/1
975 TABLET ORAL EVERY 6 HOURS PRN
Status: DISCONTINUED | OUTPATIENT
Start: 2024-09-24 | End: 2024-09-25

## 2024-09-24 RX ORDER — CEFAZOLIN SODIUM 2 G/50ML
2000 SOLUTION INTRAVENOUS ONCE
Status: COMPLETED | OUTPATIENT
Start: 2024-09-24 | End: 2024-09-24

## 2024-09-24 RX ORDER — CEFAZOLIN SODIUM 1 G/50ML
1000 SOLUTION INTRAVENOUS EVERY 8 HOURS
Status: DISCONTINUED | OUTPATIENT
Start: 2024-09-25 | End: 2024-09-25

## 2024-09-24 RX ORDER — BUPIVACAINE HYDROCHLORIDE 2.5 MG/ML
30 INJECTION, SOLUTION EPIDURAL; INFILTRATION; INTRACAUDAL ONCE AS NEEDED
Status: COMPLETED | OUTPATIENT
Start: 2024-09-24 | End: 2024-09-25

## 2024-09-24 RX ORDER — HYDROXYZINE HYDROCHLORIDE 50 MG/1
50 TABLET, FILM COATED ORAL EVERY 6 HOURS PRN
COMMUNITY

## 2024-09-24 RX ORDER — SODIUM CHLORIDE, SODIUM LACTATE, POTASSIUM CHLORIDE, CALCIUM CHLORIDE 600; 310; 30; 20 MG/100ML; MG/100ML; MG/100ML; MG/100ML
125 INJECTION, SOLUTION INTRAVENOUS CONTINUOUS
Status: DISCONTINUED | OUTPATIENT
Start: 2024-09-24 | End: 2024-09-25

## 2024-09-24 RX ORDER — CALCIUM CARBONATE 500 MG/1
500 TABLET, CHEWABLE ORAL DAILY PRN
Status: DISCONTINUED | OUTPATIENT
Start: 2024-09-24 | End: 2024-09-25

## 2024-09-24 RX ORDER — PROMETHAZINE HYDROCHLORIDE 25 MG/ML
25 INJECTION, SOLUTION INTRAMUSCULAR; INTRAVENOUS ONCE
Status: COMPLETED | OUTPATIENT
Start: 2024-09-24 | End: 2024-09-24

## 2024-09-24 RX ADMIN — Medication 50 MCG: at 22:22

## 2024-09-24 RX ADMIN — SODIUM CHLORIDE, SODIUM LACTATE, POTASSIUM CHLORIDE, AND CALCIUM CHLORIDE 125 ML/HR: .6; .31; .03; .02 INJECTION, SOLUTION INTRAVENOUS at 21:04

## 2024-09-24 RX ADMIN — CEFAZOLIN SODIUM 2000 MG: 2 SOLUTION INTRAVENOUS at 21:05

## 2024-09-24 RX ADMIN — HYDROXYZINE HYDROCHLORIDE 25 MG: 25 TABLET, FILM COATED ORAL at 23:20

## 2024-09-24 RX ADMIN — MORPHINE SULFATE 2 MG: 2 INJECTION, SOLUTION INTRAMUSCULAR; INTRAVENOUS at 22:21

## 2024-09-24 RX ADMIN — PROMETHAZINE HYDROCHLORIDE 25 MG: 25 INJECTION INTRAMUSCULAR; INTRAVENOUS at 22:21

## 2024-09-25 ENCOUNTER — ANESTHESIA EVENT (INPATIENT)
Dept: ANESTHESIOLOGY | Facility: HOSPITAL | Age: 24
DRG: 560 | End: 2024-09-25
Payer: COMMERCIAL

## 2024-09-25 ENCOUNTER — ANESTHESIA (INPATIENT)
Dept: ANESTHESIOLOGY | Facility: HOSPITAL | Age: 24
DRG: 560 | End: 2024-09-25
Payer: COMMERCIAL

## 2024-09-25 PROBLEM — Z3A.39 39 WEEKS GESTATION OF PREGNANCY: Status: ACTIVE | Noted: 2024-03-26

## 2024-09-25 PROBLEM — O13.9 GESTATIONAL HYPERTENSION: Status: ACTIVE | Noted: 2024-09-25

## 2024-09-25 PROBLEM — F17.200 SMOKING: Status: ACTIVE | Noted: 2024-09-25

## 2024-09-25 PROBLEM — IMO0001 SMOKING: Status: ACTIVE | Noted: 2024-09-25

## 2024-09-25 PROBLEM — Z34.93 THIRD TRIMESTER PREGNANCY: Status: ACTIVE | Noted: 2024-09-25

## 2024-09-25 LAB
ABO GROUP BLD: NORMAL
BASE EXCESS BLDCOA CALC-SCNC: -10.3 MMOL/L (ref 3–11)
BASE EXCESS BLDCOV CALC-SCNC: -4.2 MMOL/L (ref 1–9)
BLD GP AB SCN SERPL QL: POSITIVE
BLOOD GROUP ANTIBODIES SERPL: NORMAL
FETAL CELL SCN BLD QL ROSETTE: NEGATIVE
HCO3 BLDCOA-SCNC: 21.1 MMOL/L (ref 17.3–27.3)
HCO3 BLDCOV-SCNC: 20.1 MMOL/L (ref 12.2–28.6)
O2 CT VFR BLDCOA CALC: 11.4 ML/DL
OXYHGB MFR BLDCOA: 46.4 %
OXYHGB MFR BLDCOV: 77.6 %
PCO2 BLDCOA: 70.8 MM[HG] (ref 30–60)
PCO2 BLDCOV: 35.1 MM HG (ref 27–43)
PH BLDCOA: 7.09 [PH] (ref 7.23–7.43)
PH BLDCOV: 7.38 [PH] (ref 7.19–7.49)
PO2 BLDCOA: 27.2 MM HG (ref 5–25)
PO2 BLDCOV: 34.6 MM HG (ref 15–45)
RH BLD: NEGATIVE
SAO2 % BLDCOV: 18.3 ML/DL
TREPONEMA PALLIDUM IGG+IGM AB [PRESENCE] IN SERUM OR PLASMA BY IMMUNOASSAY: NORMAL

## 2024-09-25 PROCEDURE — NC001 PR NO CHARGE: Performed by: STUDENT IN AN ORGANIZED HEALTH CARE EDUCATION/TRAINING PROGRAM

## 2024-09-25 PROCEDURE — 85461 HEMOGLOBIN FETAL: CPT

## 2024-09-25 PROCEDURE — 82805 BLOOD GASES W/O2 SATURATION: CPT | Performed by: OBSTETRICS & GYNECOLOGY

## 2024-09-25 PROCEDURE — 59409 OBSTETRICAL CARE: CPT | Performed by: OBSTETRICS & GYNECOLOGY

## 2024-09-25 PROCEDURE — 86850 RBC ANTIBODY SCREEN: CPT

## 2024-09-25 PROCEDURE — 86900 BLOOD TYPING SEROLOGIC ABO: CPT

## 2024-09-25 PROCEDURE — 86870 RBC ANTIBODY IDENTIFICATION: CPT | Performed by: OBSTETRICS & GYNECOLOGY

## 2024-09-25 PROCEDURE — 86901 BLOOD TYPING SEROLOGIC RH(D): CPT

## 2024-09-25 PROCEDURE — 64430 NJX AA&/STRD PUDENDAL NERVE: CPT | Performed by: OBSTETRICS & GYNECOLOGY

## 2024-09-25 PROCEDURE — 0HQ9XZZ REPAIR PERINEUM SKIN, EXTERNAL APPROACH: ICD-10-PCS | Performed by: OBSTETRICS & GYNECOLOGY

## 2024-09-25 RX ORDER — DIPHENHYDRAMINE HCL 25 MG
25 TABLET ORAL EVERY 6 HOURS PRN
Status: DISCONTINUED | OUTPATIENT
Start: 2024-09-25 | End: 2024-09-27 | Stop reason: HOSPADM

## 2024-09-25 RX ORDER — BUPIVACAINE HYDROCHLORIDE 2.5 MG/ML
INJECTION, SOLUTION EPIDURAL; INFILTRATION; INTRACAUDAL AS NEEDED
Status: DISCONTINUED | OUTPATIENT
Start: 2024-09-25 | End: 2024-09-25 | Stop reason: HOSPADM

## 2024-09-25 RX ORDER — DIPHENHYDRAMINE HYDROCHLORIDE 50 MG/ML
12.5 INJECTION INTRAMUSCULAR; INTRAVENOUS EVERY 6 HOURS PRN
Status: DISCONTINUED | OUTPATIENT
Start: 2024-09-25 | End: 2024-09-25

## 2024-09-25 RX ORDER — ACETAMINOPHEN 325 MG/1
650 TABLET ORAL EVERY 4 HOURS PRN
Status: DISCONTINUED | OUTPATIENT
Start: 2024-09-25 | End: 2024-09-27 | Stop reason: HOSPADM

## 2024-09-25 RX ORDER — OXYTOCIN/RINGER'S LACTATE 30/500 ML
1-30 PLASTIC BAG, INJECTION (ML) INTRAVENOUS
Status: DISCONTINUED | OUTPATIENT
Start: 2024-09-25 | End: 2024-09-25

## 2024-09-25 RX ORDER — BENZOCAINE/MENTHOL 6 MG-10 MG
1 LOZENGE MUCOUS MEMBRANE DAILY PRN
Status: DISCONTINUED | OUTPATIENT
Start: 2024-09-25 | End: 2024-09-27 | Stop reason: HOSPADM

## 2024-09-25 RX ORDER — ONDANSETRON 2 MG/ML
4 INJECTION INTRAMUSCULAR; INTRAVENOUS EVERY 8 HOURS PRN
Status: DISCONTINUED | OUTPATIENT
Start: 2024-09-25 | End: 2024-09-27 | Stop reason: HOSPADM

## 2024-09-25 RX ORDER — ROPIVACAINE HYDROCHLORIDE 2 MG/ML
INJECTION, SOLUTION EPIDURAL; INFILTRATION; PERINEURAL CONTINUOUS PRN
Status: DISCONTINUED | OUTPATIENT
Start: 2024-09-25 | End: 2024-09-25 | Stop reason: HOSPADM

## 2024-09-25 RX ORDER — CALCIUM CARBONATE 500 MG/1
1000 TABLET, CHEWABLE ORAL DAILY PRN
Status: DISCONTINUED | OUTPATIENT
Start: 2024-09-25 | End: 2024-09-27 | Stop reason: HOSPADM

## 2024-09-25 RX ORDER — LIDOCAINE HYDROCHLORIDE AND EPINEPHRINE 15; 5 MG/ML; UG/ML
INJECTION, SOLUTION EPIDURAL
Status: COMPLETED | OUTPATIENT
Start: 2024-09-25 | End: 2024-09-25

## 2024-09-25 RX ORDER — LIDOCAINE HYDROCHLORIDE 10 MG/ML
10 INJECTION, SOLUTION EPIDURAL; INFILTRATION; INTRACAUDAL; PERINEURAL ONCE
Status: DISCONTINUED | OUTPATIENT
Start: 2024-09-25 | End: 2024-09-25

## 2024-09-25 RX ORDER — IBUPROFEN 600 MG/1
600 TABLET, FILM COATED ORAL EVERY 6 HOURS
Status: DISCONTINUED | OUTPATIENT
Start: 2024-09-25 | End: 2024-09-27 | Stop reason: HOSPADM

## 2024-09-25 RX ORDER — SENNOSIDES 8.6 MG
1 TABLET ORAL DAILY
Status: DISCONTINUED | OUTPATIENT
Start: 2024-09-26 | End: 2024-09-27 | Stop reason: HOSPADM

## 2024-09-25 RX ORDER — SIMETHICONE 80 MG
80 TABLET,CHEWABLE ORAL 4 TIMES DAILY PRN
Status: DISCONTINUED | OUTPATIENT
Start: 2024-09-25 | End: 2024-09-27 | Stop reason: HOSPADM

## 2024-09-25 RX ADMIN — BUPIVACAINE HYDROCHLORIDE 5 ML: 2.5 INJECTION, SOLUTION EPIDURAL; INFILTRATION; INTRACAUDAL; PERINEURAL at 15:17

## 2024-09-25 RX ADMIN — Medication 50 MCG: at 01:27

## 2024-09-25 RX ADMIN — ROPIVACAINE HYDROCHLORIDE: 2 INJECTION, SOLUTION EPIDURAL; INFILTRATION at 16:22

## 2024-09-25 RX ADMIN — ROPIVACAINE HYDROCHLORIDE: 2 INJECTION, SOLUTION EPIDURAL; INFILTRATION at 09:50

## 2024-09-25 RX ADMIN — BENZOCAINE AND LEVOMENTHOL 1 APPLICATION: 200; 5 SPRAY TOPICAL at 20:51

## 2024-09-25 RX ADMIN — ROPIVACAINE HYDROCHLORIDE 4 ML: 2 INJECTION EPIDURAL; INFILTRATION; PERINEURAL at 09:10

## 2024-09-25 RX ADMIN — LIDOCAINE HYDROCHLORIDE AND EPINEPHRINE 3 ML: 15; 5 INJECTION, SOLUTION EPIDURAL at 09:07

## 2024-09-25 RX ADMIN — Medication 2 MILLI-UNITS/MIN: at 05:38

## 2024-09-25 RX ADMIN — SODIUM CHLORIDE, SODIUM LACTATE, POTASSIUM CHLORIDE, AND CALCIUM CHLORIDE 125 ML/HR: .6; .31; .03; .02 INJECTION, SOLUTION INTRAVENOUS at 15:49

## 2024-09-25 RX ADMIN — BUPIVACAINE HYDROCHLORIDE 30 ML: 2.5 INJECTION, SOLUTION EPIDURAL; INFILTRATION; INTRACAUDAL; PERINEURAL at 17:13

## 2024-09-25 RX ADMIN — CEFAZOLIN SODIUM 1000 MG: 1 SOLUTION INTRAVENOUS at 13:02

## 2024-09-25 RX ADMIN — CEFAZOLIN SODIUM 1000 MG: 1 SOLUTION INTRAVENOUS at 05:37

## 2024-09-25 RX ADMIN — IBUPROFEN 600 MG: 600 TABLET, FILM COATED ORAL at 20:44

## 2024-09-25 RX ADMIN — ROPIVACAINE HYDROCHLORIDE 8 ML/HR: 2 INJECTION EPIDURAL; INFILTRATION; PERINEURAL at 09:18

## 2024-09-25 RX ADMIN — ROPIVACAINE HYDROCHLORIDE 4 ML: 2 INJECTION EPIDURAL; INFILTRATION; PERINEURAL at 09:13

## 2024-09-25 RX ADMIN — ONDANSETRON 4 MG: 2 INJECTION INTRAMUSCULAR; INTRAVENOUS at 00:44

## 2024-09-25 RX ADMIN — SODIUM CHLORIDE, SODIUM LACTATE, POTASSIUM CHLORIDE, AND CALCIUM CHLORIDE 125 ML/HR: .6; .31; .03; .02 INJECTION, SOLUTION INTRAVENOUS at 11:39

## 2024-09-25 NOTE — OB LABOR/OXYTOCIN SAFETY PROGRESS
Oxytocin Safety Progress Check Note - Mary Jane Reyes 24 y.o. female MRN: [id number]    Unit/Bed#: -01 Encounter: [id number]    Dose (doug-units/min) Oxytocin: 4 doug-units/min  Contraction Frequency (minutes): 2-5  Contraction Intensity: Mild/Moderate  Uterine Activity Characteristics: Irregular  Cervical Dilation: 6        Cervical Effacement: 70  Fetal Station: -2  Baseline Rate (FHR): 130 bpm     FHR Category: 1               Vital Signs:   Vitals:    09/25/24 0815   BP: 124/65   Pulse:    Resp: 16   Temp: (!) 97.2 °F (36.2 °C)   SpO2:        Notes/comments:   Patient comfortable after epidural.  Will continue oxytocin titration.  D/W GORDO Thompson 9/25/2024 9:43 AM

## 2024-09-25 NOTE — L&D DELIVERY NOTE
Vaginal Delivery Summary - OB/GYN   Mary Jane Reyes 24 y.o. female MRN: [id number]  Unit/Bed#: -01 Encounter: [id number]    Pre-delivery Diagnosis:   Pregnancy at 39w2d   Depression  Anxiety  Bipolar disorder  HSV on valtrex  RH negative status     Post-delivery Diagnosis: same, delivered    Procedure: Spontaneous Vaginal Delivery and repair of 1st degree and vaginal laceration    Attending Physician: Dr. Urban  Resident Physician: Gabriela Trujillo DO, PGY-II  Other Assistant: JANA Pugh DO PGY-I    Anesthesia: Epidural    QBL: 276cc    Complications: none apparent    Specimens:   1. Arterial and venous cord gases  2. Cord blood  3. Segment of umbilical cord  4. Placenta to storage     Findings:  1. Viable female on 2024 at 1853, with APGARS of 7 and 9 at 1 and 5 minutes respectively. Weight pending at time of dictation for skin to skin bonding.  2. Spontaneous delivery of intact placenta at 1858  3. 1 degree laceration repaired with 3-0 Vicryl Rapide and vaginal laceration repaired with 3-0 vicryl rapide    Gases:  Umbilical Artery  Recent Labs     24  1854   PHCART 7.093*   BECART -10.3*       Umbilical Vein  Recent Labs     24  1854   PHCVEN 7.376   BECVEN -4.2*           Brief history and labor course:  Mary Jane Reyes is a 24 y.o. [id number] 39w2d . She presented to labor and delivery for elective induction of labor. Her pregnancy was complicated by depression, bipolar disorder, anxiety, RH negative status, HSV, Obesity,. On exam in triage she was noted to be fingertip/50/-3. She was admitted for elective induction of labor. She was induced with FB, cytotec, and pitocin. She SROMed at 0815 for clear fluid and receieved and epidural. She progressed to complete and began pushing.    Description of delivery:    Patient delivered a viable female , wt pending as mother is doing skin to skin bonding. The fetal vertex delivered LUCIA position spontaneously. There was a single  nuchal cord which was easily reduced. The left anterior shoulder delivered atraumatically with maternal expulsive forces and the assistance of gentle downward traction. The right posterior shoulder delivered with maternal expulsive forces and the assistance of gentle upward traction. The remainder of the fetus delivered spontaneously.        Upon delivery, the infant was placed on the mothers abdomen and the cord was doubly clamped and cut. Delayed cord clamping was achieved. The infant was noted to cry spontaneously and was moving all extremities appropriately. There was no evidence for injury. Awaiting nurse resuscitators evaluated the . Arterial and venous cord blood gases and cord blood was collected for analysis. These were promptly sent to the lab. In the immediate post-partum, active management of the 3rd stage of labor was performed with massage, the administration of 30 units of IV pitocin, and gentle traction on the umbilical cord. The placenta delivered spontaneously and was noted to have a centrally inserted 3 vessel cord.  The placenta was sent to storage.     Laceration Repair  The vagina, cervix, perineum, and rectum were inspected and there was noted to be a first degree laceration.    The patient was comfortable with epidural for analgesia. The vaginal mucosa and submucosa were then re approximated using 3-0 vicryl rapide. A figure of eight suture with the same stitch was thrown in the R vaginal laceration for hemostasis.    At the conclusion of the procedure, all needle, sponge, and instrument counts were noted to be correct. Dr. Urban was present and participated in all key portions of the case.    Disposition:  The patient and the  both tolerated the procedure well and are recovering in labor and delivery room       Gabriela Trujillo DO  Obstetrics and Gynecology PGY-II  2024  7:51 PM

## 2024-09-25 NOTE — ANESTHESIA POSTPROCEDURE EVALUATION
"Post-Op Assessment Note    CV Status:  Stable    Pain management: adequate      Post-op block assessment: catheter intact   Mental Status:  Awake   Hydration Status:  Stable   PONV Controlled:  Controlled   Airway Patency:  Patent     Post Op Vitals Reviewed: Yes    No anethesia notable event occurred.    Staff: Anesthesiologist           /80   Pulse (!) 113   Temp 98 °F (36.7 °C) (Temporal)   Resp 16   Ht 5' 3\" (1.6 m)   Wt 93.9 kg (207 lb)   LMP 12/25/2023 (Exact Date)   SpO2 (!) 88%   BMI 36.67 kg/m²       BP      Temp      Pulse     Resp      SpO2        "

## 2024-09-25 NOTE — ANESTHESIA PREPROCEDURE EVALUATION
Procedure:  LABOR ANALGESIA    Relevant Problems   ANESTHESIA (within normal limits)      CARDIO (within normal limits)      ENDO (within normal limits)      GI/HEPATIC (within normal limits)      /RENAL (within normal limits)      GYN   (+) 38 weeks gestation of pregnancy      HEMATOLOGY   (+) Iron deficiency anemia secondary to inadequate dietary iron intake      MUSCULOSKELETAL (within normal limits)      NEURO/PSYCH   (+) Anxiety   (+) Depression complicating pregnancy in third trimester, antepartum      Behavioral Health   (+) Anxiety during pregnancy   (+) Bipolar depression (HCC)   (+) Bipolar disease during pregnancy in third trimester (HCC)   (+) Medical marijuana use      Urinary   (+) Herpes genitalia      Other   (+) Encounter for elective induction of labor        Physical Exam    Airway    Mallampati score: II  TM Distance: >3 FB  Neck ROM: full     Dental   No notable dental hx     Cardiovascular  Rhythm: regular, Rate: normal    Pulmonary  Pulmonary exam normal Breath sounds clear to auscultation    Other Findings  post-pubertal.      Anesthesia Plan  ASA Score- 2     Anesthesia Type- epidural with ASA Monitors.         Additional Monitors:     Airway Plan:            Plan Factors-Exercise tolerance (METS): >4 METS.    Chart reviewed.  Imaging results reviewed. Existing labs reviewed. Patient summary reviewed.    Patient is a current smoker.  Patient instructed to abstain from smoking on day of procedure. Patient did not smoke on day of surgery.    Obstructive sleep apnea risk education given perioperatively.        Induction-     Postoperative Plan-     Perioperative Resuscitation Plan - Level 1 - Full Code.       Informed Consent- Anesthetic plan and risks discussed with patient.

## 2024-09-25 NOTE — OB LABOR/OXYTOCIN SAFETY PROGRESS
Oxytocin Safety Progress Check Note - Arianekeltonmaximino Reyes 24 y.o. female MRN: [id number]    Unit/Bed#: -01 Encounter: [id number]    Dose (doug-units/min) Oxytocin: 4 doug-units/min  Contraction Frequency (minutes): 1-3  Contraction Intensity: Mild  Uterine Activity Characteristics: Irregular  Cervical Dilation: 3-4        Cervical Effacement: 70  Fetal Station: -3  Baseline Rate (FHR): 135 bpm     FHR Category: cat 1               Vital Signs:   Vitals:    09/25/24 0427   BP: 118/76   Pulse: 70   Resp:    Temp:    SpO2:        Notes/comments:   SVE deferred at this time, category 1 tracing, continue Pitocin titration.    Gabriela Trujillo DO 9/25/2024 6:26 AM

## 2024-09-25 NOTE — OB LABOR/OXYTOCIN SAFETY PROGRESS
Oxytocin Safety Progress Check Note - Mary Jane Reyes 24 y.o. female MRN: [id number]    Unit/Bed#: -01 Encounter: [id number]    Dose (doug-units/min) Oxytocin: 8 doug-units/min  Contraction Frequency (minutes): 2-3  Contraction Intensity: Strong  Uterine Activity Characteristics: Regular  Cervical Dilation: 10        Cervical Effacement: 100  Fetal Station: 3  Baseline Rate (FHR): 130 bpm     FHR Category: 1               Vital Signs:   Vitals:    24 1450   BP: (!) 141/103   Pulse:    Resp:    Temp:    SpO2:        Notes/comments:   Patient pushing approx 1.5hr. Good descent from +2 to +3 station, EUGENIE position. Repositioned from dorsal lithotomy to squatting to hands and knees per pt preference and comfort. Despite repositioning, patient expressed pain limiting pushing and received epidural bolus. Improving pain now.     Endorses uncertainty in ability to proceed with pushing. Partially discomfort, partially anxiety limiting. Also becoming fatigued. Reviewed option for VAVD. All risks, benefits, and alternatives were discussed with the patient. Risks included, but were not limited to,  fetal risks including scalp laceration, bruising, subgaleal hematoma, cephalohematoma, intracranial hematoma,  jaundice, as well as risks to maternal tissue/lac and shoulder dystocia. Patient expressed understanding of the risks and is undecided. Requests to take a short break while obtaining adequate anesthesia and self soothing.      Leena Urban MD 2024 3:48 PM

## 2024-09-25 NOTE — PLAN OF CARE
Problem: Knowledge Deficit  Goal: Verbalizes understanding of labor plan  Description: Assess patient/family/caregiver's baseline knowledge level and ability to understand information.  Provide education via patient/family/caregiver's preferred learning method at appropriate level of understanding.     1. Provide teaching at level of understanding.  2. Provide teaching via preferred learning method(s).  Outcome: Progressing  Goal: Patient/family/caregiver demonstrates understanding of disease process, treatment plan, medications, and discharge instructions  Description: Complete learning assessment and assess knowledge base.  Interventions:  - Provide teaching at level of understanding  - Provide teaching via preferred learning methods  Outcome: Progressing     Problem: Labor & Delivery  Goal: Manages discomfort  Description: Assess and monitor for signs and symptoms of discomfort.  Assess patient's pain level regularly and per hospital policy.  Administer medications as ordered. Support use of nonpharmacological methods to help control pain such as distraction, imagery, relaxation, and application of heat and cold.  Collaborate with interdisciplinary team and patient to determine appropriate pain management plan.    1. Include patient in decisions related to comfort.  2. Offer non-pharmacological pain management interventions.  3. Report ineffective pain management to physician.  Outcome: Progressing  Goal: Patient vital signs are stable  Description: 1. Assess vital signs - vaginal delivery.  Outcome: Progressing     Problem: PAIN - ADULT  Goal: Verbalizes/displays adequate comfort level or baseline comfort level  Description: Interventions:  - Encourage patient to monitor pain and request assistance  - Assess pain using appropriate pain scale  - Administer analgesics based on type and severity of pain and evaluate response  - Implement non-pharmacological measures as appropriate and evaluate response  -  Consider cultural and social influences on pain and pain management  - Notify physician/advanced practitioner if interventions unsuccessful or patient reports new pain  Outcome: Progressing     Problem: INFECTION - ADULT  Goal: Absence or prevention of progression during hospitalization  Description: INTERVENTIONS:  - Assess and monitor for signs and symptoms of infection  - Monitor lab/diagnostic results  - Monitor all insertion sites, i.e. indwelling lines, tubes, and drains  - Monitor endotracheal if appropriate and nasal secretions for changes in amount and color  - [place] appropriate cooling/warming therapies per order  - Administer medications as ordered  - Instruct and encourage patient and family to use good hand hygiene technique  - Identify and instruct in appropriate isolation precautions for identified infection/condition  Outcome: Progressing  Goal: Absence of fever/infection during neutropenic period  Description: INTERVENTIONS:  - Monitor WBC    Outcome: Progressing     Problem: SAFETY ADULT  Goal: Patient will remain free of falls  Description: INTERVENTIONS:  - Educate patient/family on patient safety including physical limitations  - Instruct patient to call for assistance with activity   - Consult OT/PT to assist with strengthening/mobility   - Keep Call bell within reach  - Keep bed low and locked with side rails adjusted as appropriate  - Keep care items and personal belongings within reach  - Initiate and maintain comfort rounds  - Make Fall Risk Sign visible to staff  - Apply yellow socks and bracelet for high fall risk patients  - Consider moving patient to room near nurses station  Outcome: Progressing  Goal: Maintain or return to baseline ADL function  Description: INTERVENTIONS:  -  Assess patient's ability to carry out ADLs; assess patient's baseline for ADL function and identify physical deficits which impact ability to perform ADLs (bathing, care of mouth/teeth, toileting, grooming,  dressing, etc.)  - Assess/evaluate cause of self-care deficits   - Assess range of motion  - Assess patient's mobility; develop plan if impaired  - Assess patient's need for assistive devices and provide as appropriate  - Encourage maximum independence but intervene and supervise when necessary  - Involve family in performance of ADLs  - Assess for home care needs following discharge   - Consider OT consult to assist with ADL evaluation and planning for discharge  - Provide patient education as appropriate  Outcome: Progressing  Goal: Maintains/Returns to pre admission functional level  Description: INTERVENTIONS:  - Perform AM-PAC 6 Click Basic Mobility/ Daily Activity assessment daily.  - Set and communicate daily mobility goal to care team and patient/family/caregiver.   - Collaborate with rehabilitation services on mobility goals if consulted  - Out of bed for toileting  - Record patient progress and toleration of activity level   Outcome: Progressing     Problem: DISCHARGE PLANNING  Goal: Discharge to home or other facility with appropriate resources  Description: INTERVENTIONS:  - Identify barriers to discharge w/patient and caregiver  - Arrange for needed discharge resources and transportation as appropriate  - Identify discharge learning needs (meds, wound care, etc.)  - Arrange for interpretive services to assist at discharge as needed  - Refer to Case Management Department for coordinating discharge planning if the patient needs post-hospital services based on physician/advanced practitioner order or complex needs related to functional status, cognitive ability, or social support system  Outcome: Progressing

## 2024-09-25 NOTE — OB LABOR/OXYTOCIN SAFETY PROGRESS
Oxytocin Safety Progress Check Note - Mary Jane Reyes 24 y.o. female MRN: [id number]    Unit/Bed#: -01 Encounter: [id number]    Dose (doug-units/min) Oxytocin: 6 doug-units/min  Contraction Frequency (minutes): 2-4  Contraction Intensity: Mild/Moderate  Uterine Activity Characteristics: Regular  Cervical Dilation: 8        Cervical Effacement: 90  Fetal Station: -1  Baseline Rate (FHR): 135 bpm     FHR Category: 1               Vital Signs:   Vitals:    09/25/24 1131   BP: 130/78   Pulse: (!) 116   Resp:    Temp:    SpO2:        Notes/comments:   Patient comfortable. Repositioned. EFM/ [place] adjusted. Continue expectant management.     ESC message to Dr. Urban with update   GORDO Santana 9/25/2024 12:03 PM

## 2024-09-25 NOTE — OB LABOR/OXYTOCIN SAFETY PROGRESS
Oxytocin Safety Progress Check Note - Mary Jane Reyes 24 y.o. female MRN: [id number]    Unit/Bed#: -01 Encounter: [id number]       Contraction Frequency (minutes): 3  Contraction Intensity: Mild  Uterine Activity Characteristics: Irregular  Cervical Dilation: 3-4        Cervical Effacement: 70  Fetal Station: -3  Baseline Rate (FHR): 135 bpm     FHR Category: cat 1               Vital Signs:   Vitals:    09/25/24 0427   BP: 118/76   Pulse: 70   Resp:    Temp:    SpO2:        Notes/comments:     WILIAN deferred, cat 1 tracing. Will order pitocin to start at 0527, 4 hours after cytotec    Gabriela Trujillo DO 9/25/2024 4:43 AM

## 2024-09-25 NOTE — OB LABOR/OXYTOCIN SAFETY PROGRESS
Oxytocin Safety Progress Check Note - Mary Jane Reyes 24 y.o. female MRN: [id number]    Unit/Bed#: -01 Encounter: [id number]    Dose (doug-units/min) Oxytocin: 8 doug-units/min  Contraction Frequency (minutes): 1.5-3  Contraction Intensity: Strong  Uterine Activity Characteristics: Irregular  Cervical Dilation: 10        Cervical Effacement: 100  Fetal Station: 3  Baseline Rate (FHR): 150 bpm  Fetal Heart Rate (FHT):  (110-130)  FHR Category: 1                  Notes/comments:   To bedside to reassess pt and discuss options. Pt favoring c/s at this time.    I reviewed with pt that c/s would be by maternal request and no clear obstetric indication. I would support her decision if this is what she decides, but reviewed normal 2nd stage progression. Discussed risks of c/s, which include but are not limited to injury to bowel/bladder/neurovasc/ureters/pelvic organs and injury to fetus. Discussed need for elevation of fetal head via use of fetal pillow. Discussed inc risk of uterine extension with low fetal station. Reviewed experience of c/s and expected sensations, which may be triggering to her as her fear of pushing is of the intense pressure in perineum.    Reviewed I recommend continued pushing with intent for . Can consider pudendal block to aid in perineal relief, vacuum assist for maternal exhaustion. Discussed prior risks of vacuum and  would be alternative if unable to push unassisted. Questions answered to pt satisfaction. She is undecided and requested additional time to discuss options with family.     Leena Urban MD 2024 4:13 PM

## 2024-09-25 NOTE — ASSESSMENT & PLAN NOTE
Patient with significant anxiety about delivery process, stopped all psych medication with pregnancy.   Atarax PRN ordered  Recommend re-starting zoloft, wellbutrin postpartum    Planning to follow up with psych on 10/7.

## 2024-09-25 NOTE — ASSESSMENT & PLAN NOTE
Patient stopped all psychiatric medications with pregnancy, would like to restart once postpartum  F/u EPDS

## 2024-09-25 NOTE — ANESTHESIA PROCEDURE NOTES
Epidural Block    Patient location during procedure: OB/L&D  Start time: 9/25/2024 9:06 AM  Reason for block: at surgeon's request and primary anesthetic  Staffing  Performed by: Ken Velásquez DO  Authorized by: Ken Velásquez DO    Preanesthetic Checklist  Completed: patient identified, IV checked, risks and benefits discussed, surgical consent, monitors and equipment checked, pre-op evaluation and timeout performed  Epidural  Patient position: sitting  Prep: Betadine  Sedation Level: no sedation  Patient monitoring: frequent blood pressure checks, continuous pulse oximetry and heart rate  Approach: midline  Location: lumbar, L3-4  Injection technique: GABE saline  Needle  Needle type: Tuohy   Needle gauge: 17 G  Needle insertion depth: 5 cm  Catheter type: multi-orifice  Catheter size: 19 G  Catheter at skin depth: 10 cm  Catheter securement method: stabilization device and clear occlusive dressing  Test dose: negativelidocaine-epinephrine (XYLOCAINE-MPF/EPINEPHRINE) 1.5 %-1:200,000 injection 3 mL - Epidural   3 mL - 9/25/2024 9:07:00 AM  Assessment  Sensory level: T10  Number of attempts: 1negative aspiration for CSF, negative aspiration for heme and no paresthesia on injection  patient tolerated the procedure well with no immediate complications  Additional Notes  Dural puncture epidural technique.  24g pencan spinal needle through tuohy needle.  +CSF.  Spinal needle removed and epidural catheter placed.

## 2024-09-25 NOTE — H&P
H & P- Obstetrics   Mary Jane Reyes 24 y.o. female MRN: [id number]  Unit/Bed#: -01 Encounter: [id number]      Assessment/Plan:    Mary Jane rodriguez a 24 y.o.  at 39w1d admitted for an elective induction of labor    SVE: Cervical Dilation: Fingertip  Cervical Effacement: 50  Cervical Consistency: Firm  Fetal Station: -3  Presentation: Vertex  Position: Unknown  Method: Manual  OB Examiner: Cassandra    Encounter for elective induction of labor  Assessment & Plan  Admit to OB/GYN service  Follow up CBC, RPR, Blood Type  EFW: 8lbs by leopalds  VTX by transabdominal ultrasound   Induction method FB + Cyto  GBS pos status: Ancef for prophylaxis (PCN allergy)  Analgesia and/or epidural at patient request  Anticipate       Medical marijuana use  Assessment & Plan  Patient uses medical marijuana for severe anxiety    Iron deficiency anemia secondary to inadequate dietary iron intake  Assessment & Plan  Admit HGB 9.6.   2 IVS in place    Anxiety during pregnancy  Assessment & Plan  Patient with significant anxiety about delivery process, stopped all psych medication with pregnancy.   Atarax PRN ordered  Recommend re-starting zoloft, wellbutrin postpartum    38 weeks gestation of pregnancy  Assessment & Plan  See A+P for eIOL    Herpes genitalia  Assessment & Plan  Taking valtrex, no lesion on admit speculum exam    Depression complicating pregnancy in third trimester, antepartum  Assessment & Plan  Patient stopped all psychiatric medications with pregnancy, would like to restart once postpartum    Rh negative status in grady pregnancy in third trimester  Assessment & Plan  Plan for rhogam postpartum               Patient of: [facility]  This patient will be an INPATIENT  and I certify the anticipated length of stay is >2 Midnights  Discussed with Dr. Calderón      SUBJECTIVE:    Chief Complaint: I am here for my induction    HPI: Mary Jane Reyes is a 24 y.o.  with an LARRY of 2024, by  Last Menstrual Period at 39w1d who is being admitted for induction. She denies having uterine contractions, has no LOF, and reports no VB. She states she has felt good FM.. This pregnancy is complicated by a history of bipolar disorder, severe anxiety not on medication, Rh- status, HSV, iron deficiency anemia. All other review of systems is negative.       Pregnancy Plan:  Pregnancy: Johns  Fetal sex: Female  Support person: Mitch     Delivery Plans  Planned delivery method: Vaginal  Planned delivery location: [facility] L&D  Planned anesthesia: Epidural  Acceptable blood products: All     Post-Delivery Plans  Feeding intentions: Breast Milk      Patient Active Problem List   Diagnosis    Rh negative status in johns pregnancy in third trimester    Depression complicating pregnancy in third trimester, antepartum    Herpes genitalia    Bipolar depression (HCC)    Anxiety    Hyperemesis gravidarum    38 weeks gestation of pregnancy    Anxiety during pregnancy    Bipolar disease during pregnancy in third trimester (HCC)    Iron deficiency anemia secondary to inadequate dietary iron intake    Medical marijuana use    Encounter for elective induction of labor       OB History    Para Term  AB Living   3 0 0 0 2 0   SAB IAB Ectopic Multiple Live Births   1 1            # Outcome Date GA Lbr [place]/2nd Weight Sex Type Anes PTL Lv   3 Current            2 SAB      SAB      1 IAB 2018     TAB          Past Medical History:   Diagnosis Date    Anxiety     Depression     [place] [facility] [place]    Herpes     only has had 1 outbreak    Miscarriage        Past Surgical History:   Procedure Laterality Date    CYST REMOVAL      inguinal cyst    WISDOM TOOTH EXTRACTION         Social History     Tobacco Use    Smoking status: Never    Smokeless tobacco: Never   Substance Use Topics    Alcohol use: Not Currently     Comment: social       Allergies   Allergen Reactions    Bactrim  "[Sulfamethoxazole-Trimethoprim] Itching    Latex Itching    Nickel Itching     Skin turns green    Venofer [Iron Sucrose] Diarrhea    Amoxicillin Diarrhea    Penicillins Diarrhea and Hives     Not 100% sure if the hives were from the antibiotics.            Medications Prior to Admission:     ferrous sulfate 324 (65 Fe) mg    hydrOXYzine HCL (ATARAX) 50 mg tablet    Prenatal Vit-Iron Carbonyl-FA (prenatal multivitamin) TABS    valACYclovir (VALTREX) 500 mg tablet    hydrOXYzine HCL (ATARAX) 10 mg tablet        OBJECTIVE:  Vitals:  Temp:  [98.4 °F (36.9 °C)] 98.4 °F (36.9 °C)  HR:  [74-93] 74  Resp:  [20] 20  BP: (120-142)/(73-87) 136/80  Body mass index is 36.67 kg/m².     Physical Exam:  General: Well appearing, no distress  Respiratory: Unlabored breathing  Cardiovascular: Regular rate.  Abdomen: Soft, gravid, nontender  Fundal Height: Appropriate for gestational age.  Extremities: Warm and well perfused.  Non tender.  Psychiatric: Behavioral normal        FHT:  Cat 1 tracing    TOCO:   No CTX on toco      Prenatal Labs: Blood Type:   Lab Results   Component Value Date/Time    ABO Grouping O 09/24/2024 08:59 PM     , D (Rh type):   Lab Results   Component Value Date/Time    Rh Factor Negative 09/24/2024 08:59 PM     , Antibody Screen:  Negative  , MCV:   Lab Results   Component Value Date/Time    MCV 89 09/24/2024 08:59 PM      , Platelets:   Lab Results   Component Value Date/Time    Platelets 588 (H) 09/24/2024 08:59 PM      , 1 hour Glucola:   Lab Results   Component Value Date/Time    Glucose 88 07/03/2024 01:06 PM   , Varicella: No results found for: \"VARICELLAIGG\"    , Rubella:   Lab Results   Component Value Date/Time    Rubella IgG Quant 61.4 03/18/2024 04:55 PM        , VDRL/RPR: nonreactive    , Urine Culture/Screen:   Lab Results   Component Value Date/Time    Urine Culture No Growth <1000 cfu/mL 03/18/2024 04:55 PM       , Urine Drug Screen: No results found for: \"AMPHETUR\", \"BARBTUR\", \"BDZUR\", " "\"THCUR\", \"COCAINEUR\", \"METHADONEUR\", \"OPIATEUR\", \"PCPUR\", \"MTHAMUR\", \"ECSTASYUR\", \"TRICYCLICSUR\", Hep B:   Lab Results   Component Value Date/Time    Hepatitis B Surface Ag Non-reactive 03/18/2024 04:55 PM     , Hep C: nonreactive   HIV: nonreactive, Chlamydia: negative  , Gonorrhea:   Lab Results   Component Value Date/Time    N gonorrhoeae, DNA Probe Negative 03/19/2024 02:48 PM     , Group B Strep:    Lab Results   Component Value Date/Time    Strep Grp B PCR Positive (A) 09/06/2024 01:41 PM    Strep Grp B PCR Positive for Beta Hemolytic Strep Grp B by PCR (A) 09/06/2024 01:41 PM    Strep Grp B PCR Streptococcus agalactiae (Group B) (A) 09/06/2024 01:41 PM      ,       Gabriela Trujillo DO  9/24/2024  11:21 PM        Portions of the record may have been created with voice recognition software.  Occasional wrong word or \"sound a like\" substitutions may have occurred due to the inherent limitations of voice recognition software.  Read the chart carefully and recognize, using context, where substitutions have occurred    "

## 2024-09-25 NOTE — OB LABOR/OXYTOCIN SAFETY PROGRESS
Oxytocin Safety Progress Check Note - Mary Jane Reyes 24 y.o. female MRN: [id number]    Unit/Bed#: -01 Encounter: [id number]    Dose (doug-units/min) Oxytocin: 8 doug-units/min  Contraction Frequency (minutes): 2-4  Contraction Intensity: Strong  Uterine Activity Characteristics: Irregular  Cervical Dilation: Lip/rim (Comment)  Cervical Effacement: 100  Fetal Station: 0  Baseline Rate (FHR): 135 bpm     FHR Category: 2, early/                Vital Signs:   Vitals:    09/25/24 1300   BP: 124/57   Pulse: 75   Resp:    Temp:    SpO2:        Notes/comments:   SAFETY HUDDLE:  TRIGGER: Category 2 FHR tracing    PARTICIPANTS: LULY Parsons / Dr. Akins    REVIEW OF CURRENT PLAN OF CARE AND MANAGEMENT: The FHR tracing shows moderate variability. Recurrent decelerations have been noted for the last hour despite resuscitative measures. The patient is in the active phase of labor. During this time, labor progress has been normal. For this reason, I recommend observation with continued assessment to confirm ongoing labor progress.     TIMELINE FOR NEXT ASSESSMENT: 30-60 minutes     ALL PARTICIPANTS IN AGREEMENT WITH PLAN OF CARE: Yes    IS PERRT REQUIRED: No     GORDO Santana 9/25/2024 1:21 PM

## 2024-09-25 NOTE — ASSESSMENT & PLAN NOTE
Continue routine post partum care  Encourage ambulation  Encourage breastfeeding  Contraception: undecided, options discussed   Anticipate discharge PPD 2

## 2024-09-25 NOTE — PLAN OF CARE
Problem: Knowledge Deficit  Goal: Verbalizes understanding of labor plan  Description: Assess patient/family/caregiver's baseline knowledge level and ability to understand information.  Provide education via patient/family/caregiver's preferred learning method at appropriate level of understanding.     1. Provide teaching at level of understanding.  2. Provide teaching via preferred learning method(s).  Outcome: Progressing  Goal: Patient/family/caregiver demonstrates understanding of disease process, treatment plan, medications, and discharge instructions  Description: Complete learning assessment and assess knowledge base.  Interventions:  - Provide teaching at level of understanding  - Provide teaching via preferred learning methods  Outcome: Progressing     Problem: Labor & Delivery  Goal: Manages discomfort  Description: Assess and monitor for signs and symptoms of discomfort.  Assess patient's pain level regularly and per hospital policy.  Administer medications as ordered. Support use of nonpharmacological methods to help control pain such as distraction, imagery, relaxation, and application of heat and cold.  Collaborate with interdisciplinary team and patient to determine appropriate pain management plan.    1. Include patient in decisions related to comfort.  2. Offer non-pharmacological pain management interventions.  3. Report ineffective pain management to physician.  Outcome: Progressing  Goal: Patient vital signs are stable  Description: 1. Assess vital signs - vaginal delivery.  Outcome: Progressing     Problem: PAIN - ADULT  Goal: Verbalizes/displays adequate comfort level or baseline comfort level  Description: Interventions:  - Encourage patient to monitor pain and request assistance  - Assess pain using appropriate pain scale  - Administer analgesics based on type and severity of pain and evaluate response  - Implement non-pharmacological measures as appropriate and evaluate response  -  Consider cultural and social influences on pain and pain management  - Notify physician/advanced practitioner if interventions unsuccessful or patient reports new pain  Outcome: Progressing     Problem: INFECTION - ADULT  Goal: Absence or prevention of progression during hospitalization  Description: INTERVENTIONS:  - Assess and monitor for signs and symptoms of infection  - Monitor lab/diagnostic results  - Monitor all insertion sites, i.e. indwelling lines, tubes, and drains  - Monitor endotracheal if appropriate and nasal secretions for changes in amount and color  - [place] appropriate cooling/warming therapies per order  - Administer medications as ordered  - Instruct and encourage patient and family to use good hand hygiene technique  - Identify and instruct in appropriate isolation precautions for identified infection/condition  Outcome: Progressing  Goal: Absence of fever/infection during neutropenic period  Description: INTERVENTIONS:  - Monitor WBC    Outcome: Progressing     Problem: SAFETY ADULT  Goal: Patient will remain free of falls  Description: INTERVENTIONS:  - Educate patient/family on patient safety including physical limitations  - Instruct patient to call for assistance with activity   - Consult OT/PT to assist with strengthening/mobility   - Keep Call bell within reach  - Keep bed low and locked with side rails adjusted as appropriate  - Keep care items and personal belongings within reach  - Initiate and maintain comfort rounds  - Make Fall Risk Sign visible to staff  - Apply yellow socks and bracelet for high fall risk patients  - Consider moving patient to room near nurses station  Outcome: Progressing  Goal: Maintain or return to baseline ADL function  Description: INTERVENTIONS:  -  Assess patient's ability to carry out ADLs; assess patient's baseline for ADL function and identify physical deficits which impact ability to perform ADLs (bathing, care of mouth/teeth, toileting, grooming,  dressing, etc.)  - Assess/evaluate cause of self-care deficits   - Assess range of motion  - Assess patient's mobility; develop plan if impaired  - Assess patient's need for assistive devices and provide as appropriate  - Encourage maximum independence but intervene and supervise when necessary  - Involve family in performance of ADLs  - Assess for home care needs following discharge   - Consider OT consult to assist with ADL evaluation and planning for discharge  - Provide patient education as appropriate  Outcome: Progressing  Goal: Maintains/Returns to pre admission functional level  Description: INTERVENTIONS:  - Perform AM-PAC 6 Click Basic Mobility/ Daily Activity assessment daily.  - Set and communicate daily mobility goal to care team and patient/family/caregiver.   - Collaborate with rehabilitation services on mobility goals if consulted  - Out of bed for toileting  - Record patient progress and toleration of activity level   Outcome: Progressing

## 2024-09-25 NOTE — OB LABOR/OXYTOCIN SAFETY PROGRESS
Labor Progress Note - Mary Jane Reyes 24 y.o. female MRN: [id number]    Unit/Bed#: -01 Encounter: [id number]       Contraction Frequency (minutes): occasional  Contraction Intensity: Mild  Uterine Activity Characteristics: Irritability  Cervical Dilation: 3-4        Cervical Effacement: 70  Fetal Station: -3  Baseline Rate (FHR): 130 bpm     FHR Category: cat 1               Vital Signs:   Vitals:    09/25/24 0129   BP: 112/69   Pulse: 74   Resp:    Temp:    SpO2:        Notes/comments:     FB expelled. SVE as above. Oral cyto given at 0127. Will start pitocin at 0527    Gabriela Trujillo DO 9/25/2024 1:55 AM

## 2024-09-26 LAB
ALBUMIN SERPL BCG-MCNC: 3.2 G/DL (ref 3.5–5)
ALP SERPL-CCNC: 229 U/L (ref 34–104)
ALT SERPL W P-5'-P-CCNC: 14 U/L (ref 7–52)
ANION GAP SERPL CALCULATED.3IONS-SCNC: 8 MMOL/L (ref 4–13)
AST SERPL W P-5'-P-CCNC: 16 U/L (ref 13–39)
BILIRUB SERPL-MCNC: 0.19 MG/DL (ref 0.2–1)
BUN SERPL-MCNC: 6 MG/DL (ref 5–25)
CALCIUM ALBUM COR SERPL-MCNC: 9.3 MG/DL (ref 8.3–10.1)
CALCIUM SERPL-MCNC: 8.7 MG/DL (ref 8.4–10.2)
CHLORIDE SERPL-SCNC: 105 MMOL/L (ref 96–108)
CO2 SERPL-SCNC: 20 MMOL/L (ref 21–32)
CREAT SERPL-MCNC: 0.48 MG/DL (ref 0.6–1.3)
GFR SERPL CREATININE-BSD FRML MDRD: 137 ML/MIN/1.73SQ M
GLUCOSE SERPL-MCNC: 92 MG/DL (ref 65–140)
POTASSIUM SERPL-SCNC: 3.9 MMOL/L (ref 3.5–5.3)
PROT SERPL-MCNC: 6.6 G/DL (ref 6.4–8.4)
SODIUM SERPL-SCNC: 133 MMOL/L (ref 135–147)

## 2024-09-26 PROCEDURE — 99024 POSTOP FOLLOW-UP VISIT: CPT | Performed by: OBSTETRICS & GYNECOLOGY

## 2024-09-26 RX ORDER — PHENYLEPHRINE HYDROCHLORIDE 10 MG/ML
INJECTION INTRAVENOUS
Status: DISPENSED
Start: 2024-09-26 | End: 2024-09-27

## 2024-09-26 RX ORDER — LIDOCAINE HCL/EPINEPHRINE/PF 2%-1:200K
VIAL (ML) INJECTION
Status: DISCONTINUED
Start: 2024-09-26 | End: 2024-09-26 | Stop reason: WASHOUT

## 2024-09-26 RX ORDER — LIDOCAINE HCL/EPINEPHRINE/PF 2%-1:200K
VIAL (ML) INJECTION
Status: DISPENSED
Start: 2024-09-26 | End: 2024-09-27

## 2024-09-26 RX ORDER — ONDANSETRON 2 MG/ML
INJECTION INTRAMUSCULAR; INTRAVENOUS
Status: DISPENSED
Start: 2024-09-26 | End: 2024-09-27

## 2024-09-26 RX ORDER — FENTANYL CITRATE 50 UG/ML
INJECTION, SOLUTION INTRAMUSCULAR; INTRAVENOUS
Status: DISCONTINUED
Start: 2024-09-26 | End: 2024-09-26 | Stop reason: WASHOUT

## 2024-09-26 RX ORDER — ALBUMIN, HUMAN INJ 5% 5 %
SOLUTION INTRAVENOUS
Status: DISPENSED
Start: 2024-09-26 | End: 2024-09-27

## 2024-09-26 RX ORDER — TRANEXAMIC ACID 100 MG/ML
INJECTION, SOLUTION INTRAVENOUS
Status: DISPENSED
Start: 2024-09-26 | End: 2024-09-27

## 2024-09-26 RX ORDER — HYDROXYZINE HYDROCHLORIDE 25 MG/1
50 TABLET, FILM COATED ORAL
Status: DISCONTINUED | OUTPATIENT
Start: 2024-09-26 | End: 2024-09-27 | Stop reason: HOSPADM

## 2024-09-26 RX ORDER — METOCLOPRAMIDE HYDROCHLORIDE 5 MG/ML
INJECTION INTRAMUSCULAR; INTRAVENOUS
Status: DISPENSED
Start: 2024-09-26 | End: 2024-09-27

## 2024-09-26 RX ORDER — FERROUS SULFATE 325(65) MG
325 TABLET ORAL DAILY
Status: DISCONTINUED | OUTPATIENT
Start: 2024-09-26 | End: 2024-09-27 | Stop reason: HOSPADM

## 2024-09-26 RX ORDER — KETOROLAC TROMETHAMINE 30 MG/ML
INJECTION, SOLUTION INTRAMUSCULAR; INTRAVENOUS
Status: DISPENSED
Start: 2024-09-26 | End: 2024-09-27

## 2024-09-26 RX ORDER — MIDAZOLAM HYDROCHLORIDE 2 MG/2ML
INJECTION, SOLUTION INTRAMUSCULAR; INTRAVENOUS
Status: DISCONTINUED
Start: 2024-09-26 | End: 2024-09-26 | Stop reason: WASHOUT

## 2024-09-26 RX ORDER — MORPHINE SULFATE 0.5 MG/ML
INJECTION, SOLUTION EPIDURAL; INTRATHECAL; INTRAVENOUS
Status: DISCONTINUED
Start: 2024-09-26 | End: 2024-09-26 | Stop reason: WASHOUT

## 2024-09-26 RX ORDER — CARBOPROST TROMETHAMINE 250 UG/ML
INJECTION, SOLUTION INTRAMUSCULAR
Status: DISPENSED
Start: 2024-09-26 | End: 2024-09-27

## 2024-09-26 RX ADMIN — IBUPROFEN 600 MG: 600 TABLET, FILM COATED ORAL at 15:00

## 2024-09-26 RX ADMIN — ACETAMINOPHEN 650 MG: 325 TABLET ORAL at 11:27

## 2024-09-26 RX ADMIN — IBUPROFEN 600 MG: 600 TABLET, FILM COATED ORAL at 23:54

## 2024-09-26 RX ADMIN — IBUPROFEN 600 MG: 600 TABLET, FILM COATED ORAL at 03:30

## 2024-09-26 RX ADMIN — FERROUS SULFATE TAB 325 MG (65 MG ELEMENTAL FE) 325 MG: 325 (65 FE) TAB at 08:55

## 2024-09-26 RX ADMIN — IBUPROFEN 600 MG: 600 TABLET, FILM COATED ORAL at 08:54

## 2024-09-26 RX ADMIN — SENNOSIDES 8.6 MG: 8.6 TABLET, FILM COATED ORAL at 08:54

## 2024-09-26 RX ADMIN — ACETAMINOPHEN 650 MG: 325 TABLET ORAL at 05:11

## 2024-09-26 RX ADMIN — HYDROXYZINE HYDROCHLORIDE 50 MG: 25 TABLET ORAL at 23:54

## 2024-09-26 NOTE — CASE MANAGEMENT
Case Management Progress Note    Patient name Mary Jane Reyes  Location /-01 MRN [id number]  : 2000 Date 2024       LOS (days): 2  Geometric Mean LOS (GMLOS) (days):   Days to GMLOS:        OBJECTIVE:        Current admission status: Inpatient  Preferred Pharmacy:   [facility]/pharmacy #[id number] - [place] - [address]  [address]  [facility] [address]  Phone: [phone] Fax: [phone]    Primary Care Provider: No primary care provider on file.    Primary Insurance: [email or web address]  Secondary Insurance:     PROGRESS NOTE:    CM left message for on-call CYS worker requesting call back to confirm if baby is socially cleared for d/c on CYS end.

## 2024-09-26 NOTE — PLAN OF CARE
Problem: PAIN - ADULT  Goal: Verbalizes/displays adequate comfort level or baseline comfort level  Description: Interventions:  - Encourage patient to monitor pain and request assistance  - Assess pain using appropriate pain scale  - Administer analgesics based on type and severity of pain and evaluate response  - Implement non-pharmacological measures as appropriate and evaluate response  - Consider cultural and social influences on pain and pain management  - Notify physician/advanced practitioner if interventions unsuccessful or patient reports new pain  Outcome: Progressing     Problem: INFECTION - ADULT  Goal: Absence or prevention of progression during hospitalization  Description: INTERVENTIONS:  - Assess and monitor for signs and symptoms of infection  - Monitor lab/diagnostic results  - Monitor all insertion sites, i.e. indwelling lines, tubes, and drains  - Monitor endotracheal if appropriate and nasal secretions for changes in amount and color  - [place] appropriate cooling/warming therapies per order  - Administer medications as ordered  - Instruct and encourage patient and family to use good hand hygiene technique  - Identify and instruct in appropriate isolation precautions for identified infection/condition  Outcome: Progressing  Goal: Absence of fever/infection during neutropenic period  Description: INTERVENTIONS:  - Monitor WBC    Outcome: Progressing     Problem: SAFETY ADULT  Goal: Patient will remain free of falls  Description: INTERVENTIONS:  - Educate patient/family on patient safety including physical limitations  - Instruct patient to call for assistance with activity   - Consult OT/PT to assist with strengthening/mobility   - Keep Call bell within reach  - Keep bed low and locked with side rails adjusted as appropriate  - Keep care items and personal belongings within reach  - Initiate and maintain comfort rounds  - Make Fall Risk Sign visible to staff  - Outcome: Progressing  Goal:  Maintain or return to baseline ADL function  Description: INTERVENTIONS:  -  Assess patient's ability to carry out ADLs; assess patient's baseline for ADL function and identify physical deficits which impact ability to perform ADLs (bathing, care of mouth/teeth, toileting, grooming, dressing, etc.)  - Assess/evaluate cause of self-care deficits   - Assess range of motion  - Assess patient's mobility; develop plan if impaired  - Assess patient's need for assistive devices and provide as appropriate  - Encourage maximum independence but intervene and supervise when necessary  - Involve family in performance of ADLs  - Assess for home care needs following discharge   - Consider OT consult to assist with ADL evaluation and planning for discharge  - Provide patient education as appropriate  Outcome: Progressing  Goal: Maintains/Returns to pre admission functional level  Description: INTERVENTIONS:  - Perform AM-PAC 6 Click Basic Mobility/ Daily Activity assessment daily.  - Set and communicate daily mobility goal to care team and patient/family/caregiver.   - Collaborate with rehabilitation services on mobility goals if consulted  -- Record patient progress and toleration of activity level   Outcome: Progressing     Problem: DISCHARGE PLANNING  Goal: Discharge to home or other facility with appropriate resources  Description: INTERVENTIONS:  - Identify barriers to discharge w/patient and caregiver  - Arrange for needed discharge resources and transportation as appropriate  - Identify discharge learning needs (meds, wound care, etc.)  - Arrange for interpretive services to assist at discharge as needed  - Refer to Case Management Department for coordinating discharge planning if the patient needs post-hospital services based on physician/advanced practitioner order or complex needs related to functional status, cognitive ability, or social support system  Outcome: Progressing     Problem: Knowledge Deficit  Goal:  Patient/family/caregiver demonstrates understanding of disease process, treatment plan, medications, and discharge instructions  Description: Complete learning assessment and assess knowledge base.  Interventions:  - Provide teaching at level of understanding  - Provide teaching via preferred learning methods  Outcome: Progressing

## 2024-09-26 NOTE — PROGRESS NOTES
Progress Note - OB/GYN   Mary Jane Reyes 24 y.o. female MRN: [id number]  Unit/Bed#: -01 Encounter: [id number]      Assessment/Plan    Mary Jane Reyes is a 24 y.o.  who is PPD 1 s/p  at 39w2d     Gestational hypertension  Assessment & Plan  Admission CBC wnl except anemia, f/u CMP    Systolic (24hrs), Av , Min:107 , Max:168   Diastolic (24hrs), Av, Min:54, Max:103        Medical marijuana use  Assessment & Plan  Patient uses medical marijuana for severe anxiety    Iron deficiency anemia secondary to inadequate dietary iron intake  Assessment & Plan  Admit Hgb 9.6, oral iron ordered (reports diarrhea with venofer)    Anxiety during pregnancy  Assessment & Plan  Patient with significant anxiety about delivery process, stopped all psych medication with pregnancy.   Atarax PRN ordered  Recommend re-starting zoloft, wellbutrin postpartum    Planning to follow up with psych on 10/7.     Depression complicating pregnancy in third trimester, antepartum  Assessment & Plan  Patient stopped all psychiatric medications with pregnancy, would like to restart once postpartum  F/u EPDS    Rh negative status in grady pregnancy in third trimester  Assessment & Plan  Postpartum rhogam panel ordered    *  (spontaneous vaginal delivery)  Assessment & Plan  Continue routine post partum care  Encourage ambulation  Encourage breastfeeding  Contraception: undecided, options discussed   Anticipate discharge PPD 2               Disposition: Anticipate discharge home postpartum Day #2  Barriers to discharge: blood pressures, ongoing couplet care      Subjective/Objective     Subjective: Postpartum state. Denies headache, visual changes, SOB, RUQ/epigastric pain, CP.     Pain: no  Tolerating PO: yes  Voiding: yes  Flatus: yes  Ambulating: yes  Breastfeeding: Breastfeeding  Chest pain: no  Shortness of breath: no  Leg pain: no  Lochia: wnl    Objective:     Vitals:  Vitals:    24 2045 24 2100  09/25/24 2300 09/26/24 0300   BP: 127/60 117/66 147/93 121/77   BP Location:   Right arm Right arm   Pulse: 85  85 82   Resp:   18 18   Temp:   97.8 °F (36.6 °C) 97.8 °F (36.6 °C)   TempSrc:   Oral Oral   SpO2:   98% 99%   Weight:       Height:           Physical Exam:   GEN: appears well, alert and oriented x 3, pleasant and cooperative   CV: Regular rate  RESP: non labored breathing  ABDOMEN: soft, no tenderness, no distention, Uterine fundus firm and non-tender, at the umbilicus  EXTREMITIES: non-tender  NEURO Alert and oriented to person, place, and time.       Lab Results   Component Value Date    WBC 20.69 (H) 09/24/2024    HGB 9.6 (L) 09/24/2024    HCT 29.7 (L) 09/24/2024    MCV 89 09/24/2024     (H) 09/24/2024         Eliz Cole MD  Obstetrics & Gynecology  09/26/24

## 2024-09-26 NOTE — OB LABOR/OXYTOCIN SAFETY PROGRESS
Oxytocin Safety Progress Check Note - Mary Jane Reyes 24 y.o. female MRN: [id number]    Unit/Bed#: -01 Encounter: [id number]    Dose (doug-units/min) Oxytocin: 8 doug-units/min  Contraction Frequency (minutes): 1.5-3  Contraction Intensity: Strong  Uterine Activity Characteristics: Irregular  Cervical Dilation: 10  Dilation Complete Date: 09/25/24  Dilation Complete Time: 1400  Cervical Effacement: 100  Fetal Station: 3  Baseline Rate (FHR): 135 bpm  Fetal Heart Rate (FHT): 155 BPM  FHR Category: 1               Vital Signs:   Vitals:    09/2000   BP: 131/63   Pulse: 83   Resp:    Temp:    SpO2:        Notes/comments:   To bedside to review plan with pt. Maternal/fetal status remains reassuring and she has been resting per request.  She has decided to proceed with pudendal block and continued pushing. She reports better relief from discomfort s/p bolus but remains very anxious regarding pressure sensation noted prior.     Nerve block    Date/Time: 9/25/2024 8:32 PM    Performed by: Leena Urban MD  Authorized by: Leena Urban MD    Patient location:  Bedside  [place] Protocol:  procedure performed by consultantConsent: Verbal consent obtained.  Risks and benefits: risks, benefits and alternatives were discussed  Consent given by: patient  Patient understanding: patient states understanding of the procedure being performed  Patient consent: the patient's understanding of the procedure matches consent given  Procedure consent: procedure consent matches procedure scheduled  Required items: required blood products, implants, devices, and special equipment available    Indications:     Indications:  Pain relief  Location:     Body area:  Trunk    Trunk nerve: pudendal      Laterality:  Bilateral  Skin anesthesia (see MAR for exact dosages):     Skin anesthesia method:  None  Procedure details (see MAR for exact dosages):     Block needle gauge:  20 G    Guidance comment:  Anatomic landmarks     Anesthetic injected:  Lidocaine 1% w/o epi    Steroid injected:  None    Additive injected:  None    Injection procedure:  Anatomic landmarks identified, anatomic landmarks palpated, introduced needle, negative aspiration for blood and incremental injection  Post-procedure details:     Dressing:  None    Outcome:  Pain improved    Patient tolerance of procedure:  Tolerated well, no immediate complications        Leena Urban MD 9/25/2024 5:30 PM

## 2024-09-26 NOTE — CASE MANAGEMENT
Case Management Progress Note    Patient name Mary Jane Reyes  Location /-01 MRN [id number]  : 2000 Date 2024       LOS (days): 2  Geometric Mean LOS (GMLOS) (days):   Days to GMLOS:        OBJECTIVE:        Current admission status: Inpatient  Preferred Pharmacy:   [facility]/pharmacy #[id number] - [place] - [address]  [address]  [facility] [address]  Phone: [phone] Fax: [phone]    Primary Care Provider: No primary care provider on file.    Primary Insurance: [employer]  Secondary Insurance:     PROGRESS NOTE:    CM met with MOB and FOB (Mitch Arambula) at bedside. They were accompanied by a family member as well.     Introduced self and explained role. MOB reported there has been a change in address and requested updated address be reflected in chart. MOB and FOB now residing at [address] [place] [address] with FOB's grandparents. Family has all necessary supplies. MOB plans to bottle feed. She has WIC and SNAP, encouraged her to use SNAP benefits to obtain formula if needed until WIC appt is completed. Both MOB and FOB drive. Pediatrician will be SRINATH Anders. MOB completed PNC at [facility]. Baby will be added to [facility] plan.     Informed MOB CM must review MH and D+A hx and offered to come back later this afternoon so CM could speak with MOB privately. MOB was agreeable. Chart indicates MOB has hx of medical marijuana use during pregnancy. Baby's UDS was positive for THC. MOB also has hx of anxiety and bipolar depression. She was on medication but stopped during pregnancy and plans to resume postpartum. CM will discuss and confirm this information with MOB later.     Made [facility] referral (Sheela [phone]). Assigned to Jus Hernadez at [facility]. CM left Jus ocampo  requesting call back to confirm d/c plan.

## 2024-09-26 NOTE — ASSESSMENT & PLAN NOTE
Admission CBC wnl except anemia, CMP wnl    Systolic (24hrs), Av , Min:122 , Max:129   Diastolic (24hrs), Av, Min:72, Max:90

## 2024-09-26 NOTE — CASE MANAGEMENT
Case Management Progress Note    Patient name Mary Jane Reyes  Location /-01 MRN [id number]  : 2000 Date 2024       LOS (days): 2  Geometric Mean LOS (GMLOS) (days):   Days to GMLOS:        OBJECTIVE:        Current admission status: Inpatient  Preferred Pharmacy:   [id number] [place] [place] [address]  [address]  [facility] [address]  Phone: [phone] Fax: [phone]    Primary Care Provider: No primary care provider on file.    Primary Insurance: [employer]  Secondary Insurance:     PROGRESS NOTE:    CM consulted d/t hx medical marijuana use during pregnancy. Baby's UDS pending. CM will await results and follow up accordingly.

## 2024-09-26 NOTE — PLAN OF CARE
Problem: PAIN - ADULT  Goal: Verbalizes/displays adequate comfort level or baseline comfort level  Description: Interventions:  - Encourage patient to monitor pain and request assistance  - Assess pain using appropriate pain scale  - Administer analgesics based on type and severity of pain and evaluate response  - Implement non-pharmacological measures as appropriate and evaluate response  - Consider cultural and social influences on pain and pain management  - Notify physician/advanced practitioner if interventions unsuccessful or patient reports new pain  9/26/2024 0002 by Chelle Jimenez RN  Outcome: Progressing  9/26/2024 0002 by Chelle Jimenez RN  Outcome: Progressing     Problem: INFECTION - ADULT  Goal: Absence or prevention of progression during hospitalization  Description: INTERVENTIONS:  - Assess and monitor for signs and symptoms of infection  - Monitor lab/diagnostic results  - Monitor all insertion sites, i.e. indwelling lines, tubes, and drains  - Monitor endotracheal if appropriate and nasal secretions for changes in amount and color  - [place] appropriate cooling/warming therapies per order  - Administer medications as ordered  - Instruct and encourage patient and family to use good hand hygiene technique  - Identify and instruct in appropriate isolation precautions for identified infection/condition  9/26/2024 0002 by Chelle Jimenez RN  Outcome: Progressing  9/26/2024 0002 by Chelle Jimenez RN  Outcome: Progressing  Goal: Absence of fever/infection during neutropenic period  Description: INTERVENTIONS:  - Monitor WBC    9/26/2024 0002 by Chelle Jimenez RN  Outcome: Progressing  9/26/2024 0002 by Chelle Jimenez RN  Outcome: Progressing     Problem: SAFETY ADULT  Goal: Patient will remain free of falls  Description: INTERVENTIONS:  - Educate patient/family on patient safety including physical limitations  - Instruct patient to call for assistance with activity   - Consult OT/PT to assist  with strengthening/mobility   - Keep Call bell within reach  - Keep bed low and locked with side rails adjusted as appropriate  - Keep care items and personal belongings within reach  - Initiate and maintain comfort rounds  - Make Fall Risk Sign visible to staff  - Apply yellow socks and bracelet for high fall risk patients  - Consider moving patient to room near nurses station  9/26/2024 0002 by Chelle Jimenez RN  Outcome: Progressing  9/26/2024 0002 by Chelle Jimenez RN  Outcome: Progressing  Goal: Maintain or return to baseline ADL function  Description: INTERVENTIONS:  -  Assess patient's ability to carry out ADLs; assess patient's baseline for ADL function and identify physical deficits which impact ability to perform ADLs (bathing, care of mouth/teeth, toileting, grooming, dressing, etc.)  - Assess/evaluate cause of self-care deficits   - Assess range of motion  - Assess patient's mobility; develop plan if impaired  - Assess patient's need for assistive devices and provide as appropriate  - Encourage maximum independence but intervene and supervise when necessary  - Involve family in performance of ADLs  - Assess for home care needs following discharge   - Consider OT consult to assist with ADL evaluation and planning for discharge  - Provide patient education as appropriate  9/26/2024 0002 by Chelle Jimenez RN  Outcome: Progressing  9/26/2024 0002 by Chelle Jimenez RN  Outcome: Progressing  Goal: Maintains/Returns to pre admission functional level  Description: INTERVENTIONS:  - Perform AM-PAC 6 Click Basic Mobility/ Daily Activity assessment daily.  - Set and communicate daily mobility goal to care team and patient/family/caregiver.   - Collaborate with rehabilitation services on mobility goals if consulted  - Out of bed for toileting  - Record patient progress and toleration of activity level   9/26/2024 0002 by Chelle Jimenez RN  Outcome: Progressing  9/26/2024 0002 by Chelle Jimenez RN  Outcome:  Progressing     Problem: DISCHARGE PLANNING  Goal: Discharge to home or other facility with appropriate resources  Description: INTERVENTIONS:  - Identify barriers to discharge w/patient and caregiver  - Arrange for needed discharge resources and transportation as appropriate  - Identify discharge learning needs (meds, wound care, etc.)  - Arrange for interpretive services to assist at discharge as needed  - Refer to Case Management Department for coordinating discharge planning if the patient needs post-hospital services based on physician/advanced practitioner order or complex needs related to functional status, cognitive ability, or social support system  9/26/2024 0002 by Chelle Jimenez RN  Outcome: Progressing  9/26/2024 0002 by Chelle Jimenez RN  Outcome: Progressing     Problem: Knowledge Deficit  Goal: Patient/family/caregiver demonstrates understanding of disease process, treatment plan, medications, and discharge instructions  Description: Complete learning assessment and assess knowledge base.  Interventions:  - Provide teaching at level of understanding  - Provide teaching via preferred learning methods  9/26/2024 0002 by Chelle Jimenez RN  Outcome: Progressing  9/26/2024 0002 by Chelle Jimenez RN  Outcome: Progressing

## 2024-09-26 NOTE — PLAN OF CARE
Problem: Knowledge Deficit  Goal: Verbalizes understanding of labor plan  Description: Assess patient/family/caregiver's baseline knowledge level and ability to understand information.  Provide education via patient/family/caregiver's preferred learning method at appropriate level of understanding.     1. Provide teaching at level of understanding.  2. Provide teaching via preferred learning method(s).  Outcome: Completed  Goal: Patient/family/caregiver demonstrates understanding of disease process, treatment plan, medications, and discharge instructions  Description: Complete learning assessment and assess knowledge base.  Interventions:  - Provide teaching at level of understanding  - Provide teaching via preferred learning methods  Outcome: Completed     Problem: Labor & Delivery  Goal: Manages discomfort  Description: Assess and monitor for signs and symptoms of discomfort.  Assess patient's pain level regularly and per hospital policy.  Administer medications as ordered. Support use of nonpharmacological methods to help control pain such as distraction, imagery, relaxation, and application of heat and cold.  Collaborate with interdisciplinary team and patient to determine appropriate pain management plan.    1. Include patient in decisions related to comfort.  2. Offer non-pharmacological pain management interventions.  3. Report ineffective pain management to physician.  Outcome: Completed  Goal: Patient vital signs are stable  Description: 1. Assess vital signs - vaginal delivery.  Outcome: Completed     Problem: PAIN - ADULT  Goal: Verbalizes/displays adequate comfort level or baseline comfort level  Description: Interventions:  - Encourage patient to monitor pain and request assistance  - Assess pain using appropriate pain scale  - Administer analgesics based on type and severity of pain and evaluate response  - Implement non-pharmacological measures as appropriate and evaluate response  - Consider  cultural and social influences on pain and pain management  - Notify physician/advanced practitioner if interventions unsuccessful or patient reports new pain  Outcome: Completed     Problem: INFECTION - ADULT  Goal: Absence or prevention of progression during hospitalization  Description: INTERVENTIONS:  - Assess and monitor for signs and symptoms of infection  - Monitor lab/diagnostic results  - Monitor all insertion sites, i.e. indwelling lines, tubes, and drains  - Monitor endotracheal if appropriate and nasal secretions for changes in amount and color  - [place] appropriate cooling/warming therapies per order  - Administer medications as ordered  - Instruct and encourage patient and family to use good hand hygiene technique  - Identify and instruct in appropriate isolation precautions for identified infection/condition  Outcome: Completed  Goal: Absence of fever/infection during neutropenic period  Description: INTERVENTIONS:  - Monitor WBC    Outcome: Completed     Problem: SAFETY ADULT  Goal: Patient will remain free of falls  Description: INTERVENTIONS:  - Educate patient/family on patient safety including physical limitations  - Instruct patient to call for assistance with activity   - Consult OT/PT to assist with strengthening/mobility   - Keep Call bell within reach  - Keep bed low and locked with side rails adjusted as appropriate  - Keep care items and personal belongings within reach  - Initiate and maintain comfort rounds  - Make Fall Risk Sign visible to staff  - Offer Toileting every 2 Hours, in advance of need  - Initiate/Maintain alarm  - Obtain necessary fall risk management equipment:   - Apply yellow socks and bracelet for high fall risk patients  - Consider moving patient to room near nurses station  Outcome: Completed  Goal: Maintain or return to baseline ADL function  Description: INTERVENTIONS:  -  Assess patient's ability to carry out ADLs; assess patient's baseline for ADL function and  identify physical deficits which impact ability to perform ADLs (bathing, care of mouth/teeth, toileting, grooming, dressing, etc.)  - Assess/evaluate cause of self-care deficits   - Assess range of motion  - Assess patient's mobility; develop plan if impaired  - Assess patient's need for assistive devices and provide as appropriate  - Encourage maximum independence but intervene and supervise when necessary  - Involve family in performance of ADLs  - Assess for home care needs following discharge   - Consider OT consult to assist with ADL evaluation and planning for discharge  - Provide patient education as appropriate  Outcome: Completed  Goal: Maintains/Returns to pre admission functional level  Description: INTERVENTIONS:  - Perform AM-PAC 6 Click Basic Mobility/ Daily Activity assessment daily.  - Set and communicate daily mobility goal to care team and patient/family/caregiver.   - Collaborate with rehabilitation services on mobility goals if consulted  - Perform Range of Motion  times a day.  - Reposition patient every  hours.  - Dangle patient  times a day  - Stand patient  times a day  - Ambulate patient  times a day  - Out of bed to chair  times a day   - Out of bed for meals  times a day  - Out of bed for toileting  - Record patient progress and toleration of activity level   Outcome: Completed     Problem: DISCHARGE PLANNING  Goal: Discharge to home or other facility with appropriate resources  Description: INTERVENTIONS:  - Identify barriers to discharge w/patient and caregiver  - Arrange for needed discharge resources and transportation as appropriate  - Identify discharge learning needs (meds, wound care, etc.)  - Arrange for interpretive services to assist at discharge as needed  - Refer to Case Management Department for coordinating discharge planning if the patient needs post-hospital services based on physician/advanced practitioner order or complex needs related to functional status, cognitive  ability, or social support system  Outcome: Completed

## 2024-09-26 NOTE — DISCHARGE SUMMARY
Discharge Summary - Mary Jane Reyes 24 y.o. female MRN: [id number]    Unit/Bed#: -01 Encounter: [id number]    Admission Date: 2024     Discharge Date: 2024    Patient Active Problem List   Diagnosis    Rh negative status in grady pregnancy in third trimester    Depression complicating pregnancy in third trimester, antepartum    Herpes genitalia    Bipolar depression (HCC)    Anxiety    Hyperemesis gravidarum    39 weeks gestation of pregnancy    Anxiety during pregnancy    Bipolar disease during pregnancy in third trimester (HCC)    Iron deficiency anemia secondary to inadequate dietary iron intake    Medical marijuana use     (spontaneous vaginal delivery)    Third trimester pregnancy    Gestational hypertension       OBGYN Practice: [facility] Course:   Mary Jane Reyes is a 24 y.o. [id number] 39w2d . She presented to labor and delivery for elective induction of labor. Her pregnancy was complicated by depression, bipolar disorder, anxiety, RH negative status, HSV, Obesity,. On exam in triage she was noted to be fingertip/50/-3. She was admitted for elective induction of labor. She was induced with FB, cytotec, and pitocin. She SROMed at 0815 for clear fluid and receieved and epidural. She progressed to complete and began pushing.       Delivery Findings:  Mary Jane delivered a viable female  on 2024 6:53 PM via Vaginal, Spontaneous . The delivery was uncomplicated.    Baby's Weight: 3425 g (7 lb 8.8 oz); 120.81    Apgar scores: 7  and 9  at 1 and 5 minutes, respectively  Anesthesia: Epidural,   QBL: Non-Surgical QBL (mL): 276         was transferred to  nursery. Patient tolerated the procedure well and was transferred to recovery in stable condition.     Her post-partum course was complicated by a diagnosis of gestational hypertension not requiring any antihypertensive medication.  Her post-partum pain was well controlled with oral  analgesics. On day of discharge she was ambulating, voiding spontaneously, tolerating oral intake and hemodynamically stable. Mom's blood type is O negative and  Rhogam was not given.    She was discharged home on postpartum day #2 without complications. Patient was instructed to follow up with her OB as an outpatient and was given appropriate warnings to call doctor or provider if she develops signs of infection or uncontrolled pain.    Discharge Problem List by Issue:   Gestational hypertension  Assessment & Plan  Admission CBC wnl except anemia, CMP wnl    Systolic (24hrs), Av , Min:122 , Max:129   Diastolic (24hrs), Av, Min:72, Max:90        Medical marijuana use  Assessment & Plan  Patient uses medical marijuana for severe anxiety    Iron deficiency anemia secondary to inadequate dietary iron intake  Assessment & Plan  Admit Hgb 9.6, oral iron ordered (reports diarrhea with venofer)    Anxiety during pregnancy  Assessment & Plan  Patient with significant anxiety about delivery process, stopped all psych medication with pregnancy.   Atarax PRN ordered  Recommend re-starting zoloft, wellbutrin postpartum    Planning to follow up with psych on 10/7.     Depression complicating pregnancy in third trimester, antepartum  Assessment & Plan  Patient stopped all psychiatric medications with pregnancy, would like to restart once postpartum  F/u EPDS    Rh negative status in grady pregnancy in third trimester  Assessment & Plan  Postpartum rhogam panel ordered    *  (spontaneous vaginal delivery)  Assessment & Plan  Continue routine post partum care  Encourage ambulation  Encourage breastfeeding  Contraception: undecided, options discussed   Anticipate discharge PPD 2             Disposition: Home    Planned Readmission: No    Discharge Medications:   Please see AVS    Discharge instructions :   -Do not place anything (no partner, tampons or douche) in your vagina for 6 weeks.  -You may walk for exercise  for the first 6 weeks then gradually return to your usual activities.   -Please do not drive for 1 week if you have no stitches and for 2 weeks if you have stitches or underwent a  delivery.    -You may take baths or shower per your preference.   -Please look at your bust (breasts) in the mirror daily and call your doctor for redness or tenderness or increased warmth.   - If you have had a  section please look at your incision daily as well and call provider for increasing redness or steady drainage from the incision.   -Please call your doctor's office if temperature > 100.4*F or 38* C, worsening pain or a foul discharge.    Follow Up:  - Follow up in 3 weeks for postpartum visit

## 2024-09-26 NOTE — CASE MANAGEMENT
Case Management Progress Note    Patient name Mary Jane Reyes  Location /-01 MRN [id number]  : 2000 Date 2024       LOS (days): 2  Geometric Mean LOS (GMLOS) (days):   Days to GMLOS:        OBJECTIVE:        Current admission status: Inpatient  Preferred Pharmacy:   [id number] [place] [facility] [place] [address]  [address]  [facility] [address]  Phone: [phone] Fax: [phone]    Primary Care Provider: No primary care provider on file.    Primary Insurance: [employer]  Secondary Insurance:     PROGRESS NOTE:    CM entered MOB's room to follow up but there were more visitors. Still awaiting call back from [facility] worker Jus. Sent email to Jus requesting response [email or web address].

## 2024-09-27 VITALS
BODY MASS INDEX: 36.68 KG/M2 | DIASTOLIC BLOOD PRESSURE: 76 MMHG | SYSTOLIC BLOOD PRESSURE: 129 MMHG | OXYGEN SATURATION: 98 % | RESPIRATION RATE: 18 BRPM | TEMPERATURE: 97.7 F | HEIGHT: 63 IN | WEIGHT: 207 LBS | HEART RATE: 76 BPM

## 2024-09-27 PROCEDURE — 99024 POSTOP FOLLOW-UP VISIT: CPT | Performed by: STUDENT IN AN ORGANIZED HEALTH CARE EDUCATION/TRAINING PROGRAM

## 2024-09-27 RX ORDER — IBUPROFEN 600 MG/1
600 TABLET, FILM COATED ORAL EVERY 6 HOURS
Start: 2024-09-27

## 2024-09-27 RX ORDER — ACETAMINOPHEN 325 MG/1
650 TABLET ORAL EVERY 4 HOURS PRN
Start: 2024-09-27

## 2024-09-27 RX ORDER — BENZOCAINE/MENTHOL 6 MG-10 MG
1 LOZENGE MUCOUS MEMBRANE DAILY PRN
Start: 2024-09-27

## 2024-09-27 RX ADMIN — SENNOSIDES 8.6 MG: 8.6 TABLET, FILM COATED ORAL at 09:49

## 2024-09-27 NOTE — PLAN OF CARE
Problem: PAIN - ADULT  Goal: Verbalizes/displays adequate comfort level or baseline comfort level  Description: Interventions:  - Encourage patient to monitor pain and request assistance  - Assess pain using appropriate pain scale  - Administer analgesics based on type and severity of pain and evaluate response  - Implement non-pharmacological measures as appropriate and evaluate response  - Consider cultural and social influences on pain and pain management  - Notify physician/advanced practitioner if interventions unsuccessful or patient reports new pain  Outcome: Progressing     Problem: INFECTION - ADULT  Goal: Absence or prevention of progression during hospitalization  Description: INTERVENTIONS:  - Assess and monitor for signs and symptoms of infection  - Monitor lab/diagnostic results  - Monitor all insertion sites, i.e. indwelling lines, tubes, and drains  - Monitor endotracheal if appropriate and nasal secretions for changes in amount and color  - [place] appropriate cooling/warming therapies per order  - Administer medications as ordered  - Instruct and encourage patient and family to use good hand hygiene technique  - Identify and instruct in appropriate isolation precautions for identified infection/condition  Outcome: Progressing  Goal: Absence of fever/infection during neutropenic period  Description: INTERVENTIONS:  - Monitor WBC    Outcome: Progressing     Problem: SAFETY ADULT  Goal: Patient will remain free of falls  Description: INTERVENTIONS:  - Educate patient/family on patient safety including physical limitations  - Instruct patient to call for assistance with activity   - Consult OT/PT to assist with strengthening/mobility   - Keep Call bell within reach  - Keep bed low and locked with side rails adjusted as appropriate  - Keep care items and personal belongings within reach  - Initiate and maintain comfort rounds  - Make Fall Risk Sign visible to staff  - Offer Toileting in advance of  need    Outcome: Progressing  Goal: Maintain or return to baseline ADL function  Description: INTERVENTIONS:  -  Assess patient's ability to carry out ADLs; assess patient's baseline for ADL function and identify physical deficits which impact ability to perform ADLs (bathing, care of mouth/teeth, toileting, grooming, dressing, etc.)  - Assess/evaluate cause of self-care deficits   - Assess range of motion  - Assess patient's mobility; develop plan if impaired  - Assess patient's need for assistive devices and provide as appropriate  - Encourage maximum independence but intervene and supervise when necessary  - Involve family in performance of ADLs  - Assess for home care needs following discharge   - Consider OT consult to assist with ADL evaluation and planning for discharge  - Provide patient education as appropriate  Outcome: Progressing  Goal: Maintains/Returns to pre admission functional level  Description: INTERVENTIONS:  - Perform AM-PAC 6 Click Basic Mobility/ Daily Activity assessment daily.  - Set and communicate daily mobility goal to care team and patient/family/caregiver.   - Collaborate with rehabilitation services on mobility goals if consulted  - Out of bed for toileting  - Record patient progress and toleration of activity level   Outcome: Progressing     Problem: DISCHARGE PLANNING  Goal: Discharge to home or other facility with appropriate resources  Description: INTERVENTIONS:  - Identify barriers to discharge w/patient and caregiver  - Arrange for needed discharge resources and transportation as appropriate  - Identify discharge learning needs (meds, wound care, etc.)  - Arrange for interpretive services to assist at discharge as needed  - Refer to Case Management Department for coordinating discharge planning if the patient needs post-hospital services based on physician/advanced practitioner order or complex needs related to functional status, cognitive ability, or social support  system  Outcome: Progressing     Problem: Knowledge Deficit  Goal: Patient/family/caregiver demonstrates understanding of disease process, treatment plan, medications, and discharge instructions  Description: Complete learning assessment and assess knowledge base.  Interventions:  - Provide teaching at level of understanding  - Provide teaching via preferred learning methods  Outcome: Progressing

## 2024-09-27 NOTE — PROGRESS NOTES
Progress Note - OB/GYN   Mary Jane Reyes 24 y.o. female MRN: [id number]  Unit/Bed#: -01 Encounter: [id number]      Assessment/Plan    Mary Jane Reyes is a 24 y.o.  who is PPD 2 s/p  at 39w2d     Gestational hypertension  Assessment & Plan  Admission CBC wnl except anemia, CMP wnl    Systolic (24hrs), Av , Min:122 , Max:129   Diastolic (24hrs), Av, Min:72, Max:90        Medical marijuana use  Assessment & Plan  Patient uses medical marijuana for severe anxiety    Iron deficiency anemia secondary to inadequate dietary iron intake  Assessment & Plan  Admit Hgb 9.6, oral iron ordered (reports diarrhea with venofer)    Anxiety during pregnancy  Assessment & Plan  Patient with significant anxiety about delivery process, stopped all psych medication with pregnancy.   Atarax PRN ordered  Recommend re-starting zoloft, wellbutrin postpartum    Planning to follow up with psych on 10/7.     Depression complicating pregnancy in third trimester, antepartum  Assessment & Plan  Patient stopped all psychiatric medications with pregnancy, would like to restart once postpartum  F/u EPDS    Rh negative status in grady pregnancy in third trimester  Assessment & Plan  Postpartum rhogam panel ordered    *  (spontaneous vaginal delivery)  Assessment & Plan  Continue routine post partum care  Encourage ambulation  Encourage breastfeeding  Contraception: undecided, options discussed   Anticipate discharge PPD 2               Disposition: Anticipate discharge home postpartum Day #2  Barriers to discharge: none      Subjective/Objective     Subjective: Postpartum state    Pain: no  Tolerating PO: yes  Voiding: yes  Flatus: yes  Ambulating: yes  Breastfeeding: Bottle feeding  Chest pain: no  Shortness of breath: no  Leg pain: no  Lochia: wnl    Objective:     Vitals:  Vitals:    24 0700 24 1050 24 1500 24 2354   BP: 127/86 122/90 125/79 129/72   BP Location:   Right arm Left arm    Pulse: 69 87 81 88   Resp: 18 18 16 18   Temp: 97.9 °F (36.6 °C) 98.1 °F (36.7 °C) 98 °F (36.7 °C) 98.2 °F (36.8 °C)   TempSrc:   Oral Oral   SpO2:   98% 98%   Weight:       Height:           Physical Exam:   GEN: appears well, alert and oriented x 3, pleasant and cooperative   CV: Regular rate  RESP: non labored breathing  ABDOMEN: soft, no tenderness, no distention, Uterine fundus firm and non-tender,  below the umbilicus  EXTREMITIES: non-tender  NEURO Alert and oriented to person, place, and time.       Lab Results   Component Value Date    WBC 20.69 (H) 09/24/2024    HGB 9.6 (L) 09/24/2024    HCT 29.7 (L) 09/24/2024    MCV 89 09/24/2024     (H) 09/24/2024         Eliz Cole MD  Obstetrics & Gynecology  09/27/24

## 2024-09-27 NOTE — CASE MANAGEMENT
Case Management Progress Note    Patient name Mary Jane Reyes  Location /-01 MRN [id number]  : 2000 Date 2024       LOS (days): 3  Geometric Mean LOS (GMLOS) (days):   Days to GMLOS:        OBJECTIVE:        Current admission status: Inpatient  Preferred Pharmacy:   [id number] [place] [address]  [address]  [facility] [address]  Phone: [phone] Fax: [phone]    Primary Care Provider: No primary care provider on file.    Primary Insurance: [employer]  Secondary Insurance:     PROGRESS NOTE:    Met with MOB and FOJUAN M at bedside. Explained CM is still waiting on communication from CYS to finalize the d/c. MOB confirmed she has active medical marijuana card. She has appt with [facility] for medication management on 10/7 to restart psychiatric medication.     Spoke with CYS supervisor, Shawna. Shawna stated report came in as information only, meaning the report will be reviewed and noted in the file but there is no plan for follow up. Family is socially cleared for d/c. Informed nursing of same.

## 2024-09-27 NOTE — CASE MANAGEMENT
Case Management Progress Note    Patient name Mary Jane Reyes  Location /-01 MRN [id number]  : 2000 Date 2024       LOS (days): 3  Geometric Mean LOS (GMLOS) (days):   Days to GMLOS:        OBJECTIVE:        Current admission status: Inpatient  Preferred Pharmacy:   [facility]/pharmacy #[id number] - [place] - [address]  [address]  [facility] [address]  Phone: [phone] Fax: [phone]    Primary Care Provider: No primary care provider on file.    Primary Insurance: [employer]  Secondary Insurance:     PROGRESS NOTE:      CM left VM and sent email to Jus requesting call back to confirm CYS is agreeable to baby d/c'ing home with family.

## 2024-09-27 NOTE — PLAN OF CARE
Problem: PAIN - ADULT  Goal: Verbalizes/displays adequate comfort level or baseline comfort level  Description: Interventions:  - Encourage patient to monitor pain and request assistance  - Assess pain using appropriate pain scale  - Administer analgesics based on type and severity of pain and evaluate response  - Implement non-pharmacological measures as appropriate and evaluate response  - Consider cultural and social influences on pain and pain management  - Notify physician/advanced practitioner if interventions unsuccessful or patient reports new pain  Outcome: Progressing     Problem: INFECTION - ADULT  Goal: Absence or prevention of progression during hospitalization  Description: INTERVENTIONS:  - Assess and monitor for signs and symptoms of infection  - Monitor lab/diagnostic results  - Monitor all insertion sites, i.e. indwelling lines, tubes, and drains  - Monitor endotracheal if appropriate and nasal secretions for changes in amount and color  - [place] appropriate cooling/warming therapies per order  - Administer medications as ordered  - Instruct and encourage patient and family to use good hand hygiene technique  - Identify and instruct in appropriate isolation precautions for identified infection/condition  Outcome: Progressing  Goal: Absence of fever/infection during neutropenic period  Description: INTERVENTIONS:  - Monitor WBC    Outcome: Progressing     Problem: SAFETY ADULT  Goal: Patient will remain free of falls  Description: INTERVENTIONS:  - Educate patient/family on patient safety including physical limitations  - Instruct patient to call for assistance with activity   - Consult OT/PT to assist with strengthening/mobility   - Keep Call bell within reach  - Keep bed low and locked with side rails adjusted as appropriate  - Keep care items and personal belongings within reach  - Initiate and maintain comfort rounds  - Make Fall Risk Sign visible to staff  - Offer Toileting every  Hours,  in advance of need  - Initiate/Maintain alarm  - Obtain necessary fall risk management equipment:   - Apply yellow socks and bracelet for high fall risk patients  - Consider moving patient to room near nurses station  Outcome: Progressing  Goal: Maintain or return to baseline ADL function  Description: INTERVENTIONS:  -  Assess patient's ability to carry out ADLs; assess patient's baseline for ADL function and identify physical deficits which impact ability to perform ADLs (bathing, care of mouth/teeth, toileting, grooming, dressing, etc.)  - Assess/evaluate cause of self-care deficits   - Assess range of motion  - Assess patient's mobility; develop plan if impaired  - Assess patient's need for assistive devices and provide as appropriate  - Encourage maximum independence but intervene and supervise when necessary  - Involve family in performance of ADLs  - Assess for home care needs following discharge   - Consider OT consult to assist with ADL evaluation and planning for discharge  - Provide patient education as appropriate  Outcome: Progressing  Goal: Maintains/Returns to pre admission functional level  Description: INTERVENTIONS:  - Perform AM-PAC 6 Click Basic Mobility/ Daily Activity assessment daily.  - Set and communicate daily mobility goal to care team and patient/family/caregiver.   - Collaborate with rehabilitation services on mobility goals if consulted  - Perform Range of Motion  times a day.  - Reposition patient every  hours.  - Dangle patient  times a day  - Stand patient  times a day  - Ambulate patient  times a day  - Out of bed to chair  times a day   - Out of bed for meals  times a day  - Out of bed for toileting  - Record patient progress and toleration of activity level   Outcome: Progressing     Problem: DISCHARGE PLANNING  Goal: Discharge to home or other facility with appropriate resources  Description: INTERVENTIONS:  - Identify barriers to discharge w/patient and caregiver  - Arrange for  needed discharge resources and transportation as appropriate  - Identify discharge learning needs (meds, wound care, etc.)  - Arrange for interpretive services to assist at discharge as needed  - Refer to Case Management Department for coordinating discharge planning if the patient needs post-hospital services based on physician/advanced practitioner order or complex needs related to functional status, cognitive ability, or social support system  Outcome: Progressing     Problem: Knowledge Deficit  Goal: Patient/family/caregiver demonstrates understanding of disease process, treatment plan, medications, and discharge instructions  Description: Complete learning assessment and assess knowledge base.  Interventions:  - Provide teaching at level of understanding  - Provide teaching via preferred learning methods  Outcome: Progressing

## 2024-09-27 NOTE — NURSING NOTE
Maternal/ discharge education completed with pt and family. Pt verbalized understanding and had no questions at this time. Encouraged to call if questions arise prior to discharge. Handouts given and reviewed.

## 2024-09-27 NOTE — UTILIZATION REVIEW
"Mother and baby discharged on 2024     NOTIFICATION OF INPATIENT ADMISSION   MATERNITY/DELIVERY AUTHORIZATION REQUEST   SERVICING FACILITY:   [facility] - L&D, , NICU  [address] [place] [address]  Tax ID: [id number]  NPI: [id number] ATTENDING PROVIDER:  Attending Name and NPI#: Leena Urban Md [[id number]]  Address: [address] [place] [address]  Phone: [phone]     ADMISSION INFORMATION:  Place of Service: Inpatient [facility]  Place of Service Code: 21  Inpatient Admission Date/Time: 24  8:25 PM  Discharge Date/Time: 2024  3:06 PM  Admitting Diagnosis Code/Description:  Encounter for full-term uncomplicated delivery [O80]   Mother: Mary Jane Reyes 2000 Estimated Date of Delivery: 24  Delivering clinician: Leena Urban   OB History          3    Para   1    Term   1       0    AB   2    Living   1         SAB   1    IAB   1    Ectopic        Multiple   0    Live Births   1                Name & MRN:   Information for the patient's :  Eric, Baby Girl (Mary Jane) [[id number]]    Delivery Information:  Sex: female  Delivered 2024 6:53 PM by Vaginal, Spontaneous; Gestational Age: 39w2d     Measurements:  Weight: 7 lb 8.8 oz (3425 g);  Height: 21.5\"    APGAR 1 minute 5 minutes 10 minutes   Totals: 7 9       UTILIZATION REVIEW CONTACT:  Brittni Thomson, Utilization   Network Utilization Review Department  Phone: [phone]  Fax [phone]  Email: [email or web address]  Contact for approvals/pending authorizations, clinical reviews, and discharge.   PHYSICIAN ADVISORY SERVICES:  Medical Necessity Denial & Yszy-bc-Ifsc Review  Phone: [phone]  Fax: [phone]  Email: [email or web address]   DISCHARGE SUPPORT TEAM:  For Patients Discharge Needs & Updates  Phone: [phone] opt. 2 Fax: " [phone]  Email: [email or web address]

## 2024-09-30 ENCOUNTER — NURSE TRIAGE (OUTPATIENT)
Age: 24
End: 2024-09-30

## 2024-09-30 ENCOUNTER — TELEPHONE (OUTPATIENT)
Age: 24
End: 2024-09-30

## 2024-09-30 NOTE — TELEPHONE ENCOUNTER
Patient calling to follow up on medical assessment form that needs to be completed today. Reviewed chart. Form just uploaded by office. Sent to patient via [employer] message.

## 2024-09-30 NOTE — TELEPHONE ENCOUNTER
"Patient called 1 week post partum   (9/25/24) exclusively bottle feeding with bilateral engorgement with breast pain. Yesterday when she woke up she had increased selling and pain in both breasts. Breasts are slightly warn to touch, no redness or streaks seen.No fever but using cotton pads in bra for leaking nipples. Advised to wear supportive bra, apply cool compresses and Tylenol or Advil for pain, avoid stimulation and in shower turn for water to be on back not on chest. Patient reports she has been using cool compress and took pain med but it is not not helping, pain is 7/10 and she is very uncomfortable. Advised patient that symptom relief can take time, will review with on call provider and call her back if any other recommendations. Patient verbalized understanding.   ESC Dr Squires- reviewed    Reason for Disposition   Normal postpartum breast pain and engorgement    Answer Assessment - Initial Assessment Questions  1. PAIN: \"How bad is the pain?\"  (Scale 1-10; or mild, moderate, severe)    - MILD - doesn't interfere with normal activities     - MODERATE - interferes with normal activities or awakens from sleep     - SEVERE - excruciating pain, unable to do any normal activities       7/10   2. SKIN: \"Does the skin appear red?\"        denies  3. LOCATION: \"Which breast?\" (e.g., left, right, both)      Bilateral   4. DELIVERY: \"When was the baby born?\"       9/25/24  5. BREASTFEEDING: \"Have you been breastfeeding?\" If yes, ask: \"When did you stop?\"      no  6. ONSET: \"When did the breast pain start?\" (e.g., hours, days)      yesterday  7. FEVER: \"Do you have a fever?\" If Yes, ask: \"What is it, how was it measured, and when did it start?\"      no  8. OTHER SYMPTOMS: \"Do you have any other symptoms?\" (e.g., abdominal pain, feeling sad or depressed, weakness)      Denies, feeling well overall, bleeding is almost stopped    Protocols used: Postpartum - Breast Pain and Engorgement-ADULT-[place]    "

## 2024-10-03 LAB — PLACENTA IN STORAGE: NORMAL

## 2024-10-05 DIAGNOSIS — O99.013 ANEMIA DURING PREGNANCY IN THIRD TRIMESTER: ICD-10-CM

## 2024-10-07 RX ORDER — FERROUS SULFATE 324(65)MG
324 TABLET, DELAYED RELEASE (ENTERIC COATED) ORAL
Qty: 30 TABLET | Refills: 2 | Status: SHIPPED | OUTPATIENT
Start: 2024-10-07

## 2024-10-14 PROBLEM — Z87.59 HISTORY OF HYPEREMESIS GRAVIDARUM: Status: ACTIVE | Noted: 2024-03-26

## 2024-10-15 ENCOUNTER — POSTPARTUM VISIT (OUTPATIENT)
Dept: OBGYN CLINIC | Facility: MEDICAL CENTER | Age: 24
End: 2024-10-15
Payer: COMMERCIAL

## 2024-10-15 VITALS
BODY MASS INDEX: 32.78 KG/M2 | WEIGHT: 185 LBS | DIASTOLIC BLOOD PRESSURE: 74 MMHG | HEIGHT: 63 IN | SYSTOLIC BLOOD PRESSURE: 122 MMHG

## 2024-10-15 DIAGNOSIS — F41.9 ANXIETY: ICD-10-CM

## 2024-10-15 DIAGNOSIS — O13.9 GESTATIONAL HYPERTENSION, ANTEPARTUM: ICD-10-CM

## 2024-10-15 DIAGNOSIS — O99.013 ANEMIA DURING PREGNANCY IN THIRD TRIMESTER: ICD-10-CM

## 2024-10-15 DIAGNOSIS — F31.9 BIPOLAR DEPRESSION (HCC): ICD-10-CM

## 2024-10-15 RX ORDER — BUPROPION HYDROCHLORIDE 150 MG/1
150 TABLET ORAL DAILY
Qty: 90 TABLET | Refills: 1 | Status: SHIPPED | OUTPATIENT
Start: 2024-10-15

## 2024-10-15 NOTE — PROGRESS NOTES
"Assessment:  24 y.o.  who is POD#3wk from  . Recurrent mood symptoms reported. Hx BPD, off medical mgmt during pregnancy.    Plan:  Diagnoses and all orders for this visit:    Postpartum exam   (spontaneous vaginal delivery)  - Routine pp care  - Anticipatory guidance given    Anemia during pregnancy in third trimester  -     CBC and Platelet; Future  -     Ferritin; Future    Gestational hypertension, antepartum  - Normal BP    Anxiety  Bipolar depression (HCC)  -     buPROPion (Wellbutrin XL) 150 mg 24 hr tablet; Take 1 tablet (150 mg total) by mouth daily    - Encouraged f/u with psych      __________________________________________________________________    Subjective   Mary Jane Reyes is a 24 y.o.  who presents POD#3wk from  of a 7lb 8.8oz baby girl. Pregnancy complicated by GHTN.      Patient reports significant mood symptoms since delivery. Hx of same (BPD, anxiety), but managed ok during pregnancy. Postpartum her symptoms have exacerbated. Feels anxious, angry at minor triggers, poor sleep. Fatigued. Denies SI/HI. Caring for baby well, self as best she can. But daily activities harder to do. Has upcoming plans to reestablish with psych.  Reviewed prior meds used. Agreeable to starting wellbutrin.     She notes her pain is resolved. Lochia very light. Her bowel function is normal and she is having regular BM's. She has returned to most of her normal activities. She has not yet returned to sexual activity.  She is bottlefeeding. Some engorgement initially. But doing OK now      Objective  /74   Ht 5' 3\" (1.6 m)   Wt 83.9 kg (185 lb)   LMP 2023 (Exact Date)   Breastfeeding No   BMI 32.77 kg/m²      Physical Exam:  Physical Exam  Constitutional:       General: She is not in acute distress.     Appearance: Normal appearance. She is not ill-appearing, toxic-appearing or diaphoretic.   Eyes:      General: No scleral icterus.        Right eye: No discharge.         " Left eye: No discharge.      Conjunctiva/sclera: Conjunctivae normal.   Cardiovascular:      Rate and Rhythm: Normal rate.   Pulmonary:      Effort: Pulmonary effort is normal. No respiratory distress.   Abdominal:      General: There is no distension.      Palpations: There is no mass.      Tenderness: There is no abdominal tenderness. There is no guarding or rebound.      Hernia: No hernia is present.   Musculoskeletal:         General: No swelling.   Skin:     General: Skin is warm and dry.      Coloration: Skin is not jaundiced or pale.      Findings: No bruising or erythema.   Neurological:      Mental Status: She is alert.   Psychiatric:         Mood and Affect: Mood normal.         Behavior: Behavior normal.         Thought Content: Thought content normal.         Judgment: Judgment normal.

## 2024-10-17 ENCOUNTER — DOCUMENTATION (OUTPATIENT)
Dept: OBGYN CLINIC | Facility: CLINIC | Age: 24
End: 2024-10-17

## 2024-10-17 ENCOUNTER — NURSE TRIAGE (OUTPATIENT)
Age: 24
End: 2024-10-17

## 2024-10-17 DIAGNOSIS — O99.343 DEPRESSION COMPLICATING PREGNANCY IN THIRD TRIMESTER, ANTEPARTUM: Primary | ICD-10-CM

## 2024-10-17 DIAGNOSIS — F31.9 BIPOLAR DEPRESSION (HCC): ICD-10-CM

## 2024-10-17 DIAGNOSIS — F32.A DEPRESSION COMPLICATING PREGNANCY IN THIRD TRIMESTER, ANTEPARTUM: Primary | ICD-10-CM

## 2024-10-17 RX ORDER — HYDROXYZINE HYDROCHLORIDE 25 MG/1
25 TABLET, FILM COATED ORAL 3 TIMES DAILY PRN
Qty: 30 TABLET | Refills: 2 | Status: SHIPPED | OUTPATIENT
Start: 2024-10-17

## 2024-10-17 NOTE — TELEPHONE ENCOUNTER
"Pt was seen in office 10/15, started on Wellbutrin, but started taking yesterday afternoon.  She reports it is not working the same as it did previously.  She was advised it does take time ot start working and she has only taken 2 doses.  She reports she is yelling all the time, angry at everything.  She reports sleeping ok, but no desire to do anything but lay in bed.  Denies SI/HI.     SEC to provider for additional recommendations.    Per provider:   I can send a message to the nurse navigators to follow up/assist with mother baby. Agree takes 1-2wk to start feeling effects of med. if wants to try a short acting med as well until effective, happy to send in atarax for prn use until then.    Spoke with patient, she advised she had been taking atarax her whole pregnancy and it did nothing and she wasn't even that bad then.      SEC back to provider.    Per provider: She also told me she had an appt with psych upcoming. Out of network I think cause I couldn't confirm it in visit. She unfortunately has a lot more going on than just PPd and I would value their input.    Patient advised ciaran with psych was cancelled due to provider being sick. Asked pt if she would be willing to try recommendations?  She said \"I guess.\"  She mentioned going to the ER, but states \"they will just commit me.\"  Explained that is not necessarily true.  But they would be able to give her referrals.  She disconnected the call at this time.   Provider aware.      Reason for Disposition   Depression symptoms persisting > 2 weeks    Answer Assessment - Initial Assessment Questions  1. CONCERN: \"What happened that made you call today?\"      Depression, anxiety and rage, crying and yelling at everyone.  2. DEPRESSION SYMPTOM SCREENING: \"How are you feeling overall?\" (e.g., decreased energy, increased sleeping or difficulty sleeping, difficulty concentrating, feelings of sadness, guilt, hopelessness, or worthlessness; problems caring for baby)      " "Is sleeping ok, but reports decreased want to do anything.   3. RISK OF HARM - SUICIDAL IDEATION:  \"Do you ever have thoughts of hurting yourself or the baby?\"  (e.g., yes, no; details).    - INTENT:  \"Do you have thoughts of hurting yourself or the baby right NOW?\" (e.g., yes, no, N/A)    - PLAN: \"Do you have a specific plan for how you would do this?\" (e.g., gun, knife, overdose, no plan, N/A)      denies  4. RISK OF HARM - HOMICIDAL IDEATION:  \"Do you ever have thoughts of hurting or killing someone else?\"  (e.g., yes, no, no but preoccupation with thoughts about death)    - INTENT:  \"Do you have thoughts of hurting or killing someone right NOW?\" (e.g., yes, no, N/A)    - PLAN: \"Do you have a specific plan for how you would do this?\" (e.g., gun, knife, no plan, N/A)       denies  5. FUNCTIONAL IMPAIRMENT: \"How have things been going for you overall? Have you had more difficulty than usual doing your normal daily activities?\"  (e.g., better, same, worse; self-care, school, work, interactions)      yes  6. SUPPORT: \"Who is with you now?\" \"Who do you live with?\" \"Do you have family or friends who you can talk to?\"       Boyfriend is home with pt now.  Grandmother and baby.  7. THERAPIST: \"Do you have a counselor or therapist? Name?\"      On a wait list for baby and me.  8. STRESSORS: \"Has there been any new stress or recent changes in your life?\"      Everything is making angry.  9. ALCOHOL USE OR SUBSTANCE USE (DRUG USE): \"Do you drink alcohol or use any illegal drugs?\"       denies  10. OTHER: \"Do you have any other physical symptoms right now?\" (e.g., fever)        denies  11. DELIVERY DATE: \"When was your delivery date?\"        9/25    Protocols used: Postpartum - Depression-ADULT-[place]    "

## 2024-10-25 ENCOUNTER — NURSE TRIAGE (OUTPATIENT)
Age: 24
End: 2024-10-25

## 2024-10-25 NOTE — TELEPHONE ENCOUNTER
"Ester from [facility] called in reporting an elevated post partum depression screening- 10. Answer to last question was never (thoughts of harming).     Called patient to discuss. Reports she is doing much better now, was seen on 10/15 and discussed with Dr Urban and placed on wellbutrin and feels it has been helping. Is able to complete ADLs and care for baby. Denies SI/HI. Is on the wait list for psychiatry. No further questions.     Will route to provider as FYI.   Reason for Disposition   Mild depression symptoms and < 1 month since delivery    Answer Assessment - Initial Assessment Questions  1. CONCERN: \"What happened that made you call today?\"      Peds called in with PPD screening- 10.   2. DEPRESSION SYMPTOM SCREENING: \"How are you feeling overall?\" (e.g., decreased energy, increased sleeping or difficulty sleeping, difficulty concentrating, feelings of sadness, guilt, hopelessness, or worthlessness; problems caring for baby)      Patient feeling good, meds working  3. RISK OF HARM - SUICIDAL IDEATION:  \"Do you ever have thoughts of hurting yourself or the baby?\"  (e.g., yes, no; details).      Denies  4. RISK OF HARM - HOMICIDAL IDEATION:  \"Do you ever have thoughts of hurting or killing someone else?\"  (e.g., yes, no, no but preoccupation with thoughts about death)      Denies  5. FUNCTIONAL IMPAIRMENT: \"How have things been going for you overall?\" \"Are you able to care for your baby?\" \"Have you had more difficulty than usual doing your normal daily activities?\"  (e.g., better, same, worse; self-care, school, work, interactions)      Doing ADLs and caring for baby  6. SUPPORT: \"Who is with you now?\" \"Who do you live with?\" \"Do you have family or friends who you can talk to, or come to help you with the baby?       Family  7. THERAPIST: \"Do you have a counselor or therapist?\" If Yes, ask: \"What is their name?\"      On waitlist for psych  8. STRESSORS: \"Besides the birth of your baby, has there been any new " "stress or recent changes in your life?\"      N/A  9. ALCOHOL USE OR SUBSTANCE USE (DRUG USE): \"Do you drink alcohol or use any illegal drugs?\"      N/A  10.  ABUSE: \"In the past year, have you been hit, slapped, kicked, or otherwise physically hurt by someone?\" (e.g., yes/no; who, what, when).         N/A  11. OTHER: \"Do you have any other physical symptoms right now?\" (e.g., fever)        N/A  12. DELIVERY DATE: \"When was your delivery date?\"        09/24/24    Protocols used: Postpartum - Depression-Adult-[place]    "

## 2024-11-04 ENCOUNTER — NURSE TRIAGE (OUTPATIENT)
Age: 24
End: 2024-11-04

## 2024-11-04 NOTE — TELEPHONE ENCOUNTER
"Patient is about 5 weeks postpartum, delivered 24 via . States vaginal bleeding stopped completely last week, but states overnight she began with heavy vaginal bleeding. Patient reports waking up this morning is blood is \"pouring out.\" Reports passing blood clots, but denies being larger than golf ball. Denies pelvic pain or severe pain at this time, but reports yesterday had severe pain in lower back and legs when walking around. States she is not breastfeeding. Denies chest pain, shortness of breath, dizziness.     Advised for now she use maxi pad to measure how much vaginal bleeding she is having. Advised she call back if soaking maxi pad or more within the hour, passing large blood clots, severe pain returns or severe pelvic pain. Patient verbalized understanding.     ESC sent to on-call provider Juli CARO for review.       Answer Assessment - Initial Assessment Questions  1. ONSET: \"When did the bleeding start?\" \"Describe your bleeding: is it getting worse, staying the same, improving, or stopping and starting?\"      Overnight  2. BLEEDING SEVERITY: \"Describe the bleeding that you are having.\" \"How much bleeding is there?\"      Patient states she feels like its severe. Has not been able to measure with pads yet.  3. ABDOMEN PAIN: \"Do you have any pain?\" \"How bad is the pain?\"  (e.g., Scale 0-10; none, mild, moderate, or severe)      Denies at this time. Patient reports pain in lower back and legs - reports severe pain when walking around.  4. HORMONES: \"Are you taking any birth control medicines?\" (e.g., birth control pills, Depo-Provera)      Denies  5. BLOOD THINNERS: \"Do you take any blood thinners?\" (e.g., aspirin, enoxaparin / Lovenox, warfarin / Coumadin)      Denies  6. OTHER SYMPTOMS: \"Do you have any other symptoms?\" (e.g., fever, chills, dizziness)      Denies fevers, chills, dizziness, CP, shortness of breath.   7. DELIVERY DATE: \"When was your delivery date?\" \"Vaginal delivery or " "?\"      24   8. BREASTFEEDING: \"Are you breastfeeding?\"      Not breastfeeding    Protocols used: Postpartum - Vaginal Bleeding and Lochia-Adult-[place]    "

## 2024-11-04 NOTE — TELEPHONE ENCOUNTER
"See recommendation from Juli Parsons: \"most likely her menses if she is having dizziness, lightheaded, or soaking though a pad an hour needs evaluation in ED TY\"    Called patient and made aware of provider recommendations. Patient verbalized understanding.      Reason for Disposition  • Normal postpartum bleeding    Protocols used: Postpartum - Vaginal Bleeding and Lochia-Adult-[place]    "

## 2024-11-14 ENCOUNTER — TELEPHONE (OUTPATIENT)
Age: 24
End: 2024-11-14

## 2024-11-14 NOTE — TELEPHONE ENCOUNTER
Spoke with patient who reports she delivered vaginally 9/25.  She reports she is wondering if her iron is low because her feet are cold.  She also reports continued diarrhea.  Vaginal bleeding as stopped a few days ago.  She denies CP/SOB.  Advised pt she does have labs to get done and we will call with results.  Pt verbalized understanding, no further questions or concerns at this time.

## 2024-11-15 ENCOUNTER — TELEPHONE (OUTPATIENT)
Dept: POSTPARTUM | Facility: CLINIC | Age: 24
End: 2024-11-15

## 2024-11-15 NOTE — TELEPHONE ENCOUNTER
Patient returned call, informed of response and recommendations. Verbalized understanding. No further questions.

## 2024-11-15 NOTE — TELEPHONE ENCOUNTER
Spoke with Mary Jane around 12:30 pm. She states she has never been breastfeeding, she is 8 weeks postpartum and is experiencing her breasts leaking often. She is wearing breast pads but is soaking the pads at times. I did let her know this is not something we'd expect to be happening at this time. I told her I'd reach out to colleagues and get back in touch with a plan for her.     Called again at 4:10 and left VM for her to call back when possible to discuss plan of care.

## 2024-11-15 NOTE — TELEPHONE ENCOUNTER
Mary Jane called - she is not breastfeeding & said her breasts are leaking (she's embarrassed) - she talked with her OB & they said to call her & we would give her something to stop it.

## 2024-11-20 DIAGNOSIS — L02.419 THIGH ABSCESS: Primary | ICD-10-CM

## 2024-12-09 NOTE — PROGRESS NOTES
Assessment/Plan:   Mary Jane Reyes is a 24 y.o.female who is here for   Chief Complaint   Patient presents with    Cyst     Patient is here today regarding a recurrent inner left thigh cyst patient had it excised 07/27/2021 with [facility] but since then she has had over 15 reoccurrences.           On exam found to have recurrent left groin cyst that was removed in 2021 at [facility]. Pathology was a epidermal inclusion cyst.    Plan: Patient has a reoccurred epidermal inclusion cyst in left groin that was removed in 2021 which healed and was asymptomatic for three months. The patient states after three months she started having reoccurring symptoms. She has had pain and drainage every 2-3 weeks and is constantly draining and requiring Keflex. She would like to try surgical excision again. She understands this could reoccur but would like to move forward with surgical excision.    Positioning: supine left frog leg      _______________________________________________________  CC:Cyst (Patient is here today regarding a recurrent inner left thigh cyst patient had it excised 07/27/2021 with [facility] but since then she has had over 15 reoccurrences.  )  .    HPI:  Mary Jane Reyes is a 24 y.o.female who was referred for evaluation of Cyst (Patient is here today regarding a recurrent inner left thigh cyst patient had it excised 07/27/2021 with [facility] but since then she has had over 15 reoccurrences.  )  .    Currently patient reports online appointment 11/1/24 for a abscess of left thigh. Patient has a reoccurring epidermal inclusion cyst in left groin that was removed in 2021 which healed and was asymptomatic for three months. The patient states after three months she started having reoccurring symptoms. She has had pain and drainage every 2-3 weeks and is constantly draining and requiring Keflex. She has had 2-3 draining episodes requiring Keflex in November alone. She is a  and states that this does effect her  job.        ROS:  General ROS: negative  negative for - chills, fatigue, fever or night sweats, weight loss  Respiratory ROS: no cough, shortness of breath, or wheezing  Cardiovascular ROS: no chest pain or dyspnea on exertion  Genito-Urinary ROS: no dysuria, trouble voiding, or hematuria  Musculoskeletal ROS: negative for - gait disturbance, joint pain or muscle pain  Neurological ROS: no TIA or stroke symptoms  Skin ROS: See HPI  GI ROS: see HPI  Skin ROS: no new rashes or lesions   Lymphatic ROS: no new adenopathy noted by pt.   Psy ROS: no new mental or behavioral disturbances       Patient Active Problem List   Diagnosis    Rh negative status in grady pregnancy in third trimester    Depression complicating pregnancy in third trimester, antepartum    Herpes genitalia    Bipolar depression (HCC)    Anxiety    History of hyperemesis gravidarum    39 weeks gestation of pregnancy    Anxiety during pregnancy    Bipolar disease during pregnancy in third trimester ([facility])    Iron deficiency anemia secondary to inadequate dietary iron intake    Medical marijuana use     (spontaneous vaginal delivery)    Gestational hypertension         Allergies:  Bactrim [sulfamethoxazole-trimethoprim], Iron sucrose, Latex, Nickel, Amoxicillin, and Penicillins      Current Outpatient Medications:     sertraline (ZOLOFT) 25 mg tablet, Take 75 mg by mouth daily, Disp: , Rfl:     sertraline (ZOLOFT) 50 mg tablet, Take 1 tablet by mouth in the morning, Disp: , Rfl:     ferrous sulfate 324 (65 Fe) mg, TAKE 1 TABLET (324 MG TOTAL) BY MOUTH DAILY BEFORE BREAKFAST, Disp: 30 tablet, Rfl: 2    hydrOXYzine HCL (ATARAX) 25 mg tablet, Take 1 tablet (25 mg total) by mouth 3 (three) times a day as needed for itching Or anxiety, Disp: 30 tablet, Rfl: 2    Past Medical History:   Diagnosis Date    Anxiety     Depression     [place] [facility] [place]    Herpes     only has had 1 outbreak    Miscarriage        Past Surgical  "History:   Procedure Laterality Date    CYST REMOVAL      inguinal cyst    WISDOM TOOTH EXTRACTION         Family History   Problem Relation Age of Onset    Bipolar disorder Mother     Depression Mother     Cancer Father         \"blood cancer\"    HIV Father     Other Brother         back issues    No Known Problems Maternal Grandmother     No Known Problems Maternal Grandfather     Cancer Paternal Grandmother         colon        reports that she has never smoked. She has never used smokeless tobacco. She reports that she does not currently use alcohol. She reports current drug use. Drug: Marijuana.    Vitals:    12/10/24 1120   BP: 112/74   Pulse: 78   Resp: 16   Temp: 98 °F (36.7 °C)   SpO2: 97%        PHYSICAL EXAM  General Appearance:    Alert, cooperative, no distress,    Head:    Normocephalic without obvious abnormality   Eyes:    PERRL, conjunctiva/corneas clear     Neck:   Supple, no adenopathy, no JVD   Back:     Symmetric, no spinal or CVA tenderness   Lungs:     Clear to auscultation bilaterally, no wheezing or rhonchi   Heart:    Regular rate and rhythm, S1 and S2 normal, no murmur   Abdomen:        Extremities:   Extremities normal. No clubbing, cyanosis or edema   Psych:   Normal Affect, AOx3.    Neurologic:  Skin:   CNII-XII intact. Strength symmetric, speech intact    Warm, dry, intact, no visible rashes or lesions except as follows: There is a small pea sized area of scarring and recent opening where this drained last week per patient. No fluctuation or tenderness on today's examination.                 Eliz Phelan PA-C    Date: 12/10/2024 Time: 11:34 AM     "

## 2024-12-10 ENCOUNTER — CONSULT (OUTPATIENT)
Dept: SURGERY | Facility: CLINIC | Age: 24
End: 2024-12-10
Payer: COMMERCIAL

## 2024-12-10 VITALS
HEART RATE: 78 BPM | DIASTOLIC BLOOD PRESSURE: 74 MMHG | WEIGHT: 180.6 LBS | SYSTOLIC BLOOD PRESSURE: 112 MMHG | TEMPERATURE: 98 F | RESPIRATION RATE: 16 BRPM | BODY MASS INDEX: 32 KG/M2 | OXYGEN SATURATION: 97 % | HEIGHT: 63 IN

## 2024-12-10 DIAGNOSIS — L02.419 THIGH ABSCESS: ICD-10-CM

## 2024-12-10 DIAGNOSIS — R19.09 LEFT GROIN MASS: Primary | ICD-10-CM

## 2024-12-10 PROCEDURE — 99243 OFF/OP CNSLTJ NEW/EST LOW 30: CPT | Performed by: PHYSICIAN ASSISTANT

## 2024-12-10 RX ORDER — SODIUM CHLORIDE, SODIUM LACTATE, POTASSIUM CHLORIDE, CALCIUM CHLORIDE 600; 310; 30; 20 MG/100ML; MG/100ML; MG/100ML; MG/100ML
125 INJECTION, SOLUTION INTRAVENOUS CONTINUOUS
OUTPATIENT
Start: 2025-02-10

## 2024-12-10 RX ORDER — SERTRALINE HYDROCHLORIDE 25 MG/1
75 TABLET, FILM COATED ORAL DAILY
COMMUNITY
Start: 2024-11-18

## 2025-01-02 ENCOUNTER — TELEPHONE (OUTPATIENT)
Age: 25
End: 2025-01-02

## 2025-01-02 ENCOUNTER — TELEPHONE (OUTPATIENT)
Dept: SURGERY | Facility: CLINIC | Age: 25
End: 2025-01-02

## 2025-01-02 DIAGNOSIS — L02.91 ABSCESS: Primary | ICD-10-CM

## 2025-01-02 RX ORDER — CEPHALEXIN 500 MG/1
500 CAPSULE ORAL 4 TIMES DAILY
Qty: 40 CAPSULE | Refills: 0 | Status: SHIPPED | OUTPATIENT
Start: 2025-01-02 | End: 2025-01-12

## 2025-01-02 NOTE — TELEPHONE ENCOUNTER
"Patient of Eliz Phelan PA-C. Was seen in the office on for evaluation of recurrent cyst at left inner groin area. Has surgery scheduled for 2/10/25. States she is currently in a lot of pain from cyst. Rates as a \"9\". Has been using warm compresses and doing sitz baths. Not helping. No fever or chills. Has been opening and draining seen being seen. Asking what she can do. Also requesting a note to be out of work today. Too painful for her to go in today. Please advise.  "

## 2025-01-02 NOTE — TELEPHONE ENCOUNTER
PT called in following up on prior message regarding work note. Reports that she did not receive note and she did not receive call back.     Reviewed letters and updated PT that letter has been written and available in [employer]. PT reports that letter is not located in [employer] even after refreshing.     Offered to fax letter; PT reports that job does not have a fax machine. PT is requesting that letter is emailed to [email or web address] BEFORE 2PM.

## 2025-01-10 ENCOUNTER — APPOINTMENT (OUTPATIENT)
Dept: LAB | Facility: MEDICAL CENTER | Age: 25
End: 2025-01-10
Payer: COMMERCIAL

## 2025-01-10 DIAGNOSIS — O99.013 ANEMIA DURING PREGNANCY IN THIRD TRIMESTER: ICD-10-CM

## 2025-01-10 LAB
ERYTHROCYTE [DISTWIDTH] IN BLOOD BY AUTOMATED COUNT: 15 % (ref 11.6–15.1)
FERRITIN SERPL-MCNC: 8 NG/ML (ref 11–307)
HCT VFR BLD AUTO: 33.7 % (ref 34.8–46.1)
HGB BLD-MCNC: 10.5 G/DL (ref 11.5–15.4)
MCH RBC QN AUTO: 27 PG (ref 26.8–34.3)
MCHC RBC AUTO-ENTMCNC: 31.2 G/DL (ref 31.4–37.4)
MCV RBC AUTO: 87 FL (ref 82–98)
PLATELET # BLD AUTO: 732 THOUSANDS/UL (ref 149–390)
PMV BLD AUTO: 9.8 FL (ref 8.9–12.7)
RBC # BLD AUTO: 3.89 MILLION/UL (ref 3.81–5.12)
WBC # BLD AUTO: 10.01 THOUSAND/UL (ref 4.31–10.16)

## 2025-01-10 PROCEDURE — 85027 COMPLETE CBC AUTOMATED: CPT

## 2025-01-10 PROCEDURE — 36415 COLL VENOUS BLD VENIPUNCTURE: CPT

## 2025-01-10 PROCEDURE — 82728 ASSAY OF FERRITIN: CPT

## 2025-01-13 ENCOUNTER — TELEPHONE (OUTPATIENT)
Age: 25
End: 2025-01-13

## 2025-01-13 ENCOUNTER — RESULTS FOLLOW-UP (OUTPATIENT)
Age: 25
End: 2025-01-13

## 2025-01-13 RX ORDER — FERROUS SULFATE 324(65)MG
324 TABLET, DELAYED RELEASE (ENTERIC COATED) ORAL
Qty: 90 TABLET | Refills: 1 | Status: SHIPPED | OUTPATIENT
Start: 2025-01-13

## 2025-01-13 NOTE — TELEPHONE ENCOUNTER
Patient of  who is scheduled on 2/10/25 for EXCISION RECURRENT LEFT GROIN CYST (Left: Groin) Had labs done and noted low iron level postpartum. Per her OB-GYN she will be started on daily iron supplements for 4-6 weeks. Then recheck levels. She is asking if she can still have the surgery. Please advise.

## 2025-01-27 ENCOUNTER — TELEPHONE (OUTPATIENT)
Age: 25
End: 2025-01-27

## 2025-01-27 ENCOUNTER — NURSE TRIAGE (OUTPATIENT)
Age: 25
End: 2025-01-27

## 2025-01-27 NOTE — TELEPHONE ENCOUNTER
"Called patient to follow up after receiving a call from the pediatricians office of a positive depression screening for an [place] score of 13. Patient delivered 9/25/24. Patient states she feels anxiety, irritability, like she hates herself and she feels sad. She states she has been feeling like this for about 2 months. She nicole snot feel like she wants to harm herself or anyone else. She has support at home, but feels like no one understands how she feels. She finds it difficult to care for herself because everything feels like a difficult task. She sees a therapist through [employer], but she feels she does not know how to care for post partum depression. She no longer takes Zoloft. She currently takes 75 mg of Atarax and she states this does not do anything to help her. She feels fatigued all the time, but other than this , she denies any physical symptoms. Advised she go to the ED if she experiences any feelings of self harm or harming others. Will reach out to provider for further recommendations.           Answer Assessment - Initial Assessment Questions  2. DEPRESSION SYMPTOM SCREENING: \"How are you feeling overall?\" (e.g., decreased energy, increased sleeping or difficulty sleeping, difficulty concentrating, feelings of sadness, guilt, hopelessness, or worthlessness; problems caring for baby)      Anxiety, irritability, hates herself, sad   3. RISK OF HARM - SUICIDAL IDEATION:  \"Do you ever have thoughts of hurting yourself or the baby?\"  (e.g., yes, no; details).      No   4. RISK OF HARM - HOMICIDAL IDEATION:  \"Do you ever have thoughts of hurting or killing someone else?\"  (e.g., yes, no, no but preoccupation with thoughts about death)      No   5. FUNCTIONAL IMPAIRMENT: \"How have things been going for you overall?\" \"Are you able to care for your baby?\" \"Have you had more difficulty than usual doing your normal daily activities?\"  (e.g., better, same, worse; self-care, school, work, interactions)      " "More difficult  - everything feels like a task too much.   6. SUPPORT: \"Who is with you now?\" \"Who do you live with?\" \"Do you have family or friends who you can talk to, or come to help you with the baby?       A lot  - boyfriend, aunt, mom, grandmother. Lives with grandmother.    7. THERAPIST: \"Do you have a counselor or therapist?\" If Yes, ask: \"What is their name?\"      Has a therapist - she doesn't know a lot about post partum. From [employer].  Name - Benita Cantor   8. STRESSORS: \"Besides the birth of your baby, has there been any new stress or recent changes in your life?\"      The way she looks, gained weight.    9. ALCOHOL USE OR SUBSTANCE USE (DRUG USE): \"Do you drink alcohol or use any illegal drugs?\"      No   10.  ABUSE: \"In the past year, have you been hit, slapped, kicked, or otherwise physically hurt by someone?\" (e.g., yes/no; who, what, when).         No   11. OTHER: \"Do you have any other physical symptoms right now?\" (e.g., fever)        Fatigue   12. DELIVERY DATE: \"When was your delivery date?\"        9/25/24    Protocols used: Postpartum - Depression-Adult-[place]    "

## 2025-01-27 NOTE — TELEPHONE ENCOUNTER
Received a call from Ester, a nurse from [facility] pediatrics, Dr. Griffin's office stating the patient had a positive  depression screening.  Per Ester : Her [place] depression score was 13 with no thoughts of self harm or harming others. She states the patient is currently receiving treatment from a therapist, however she is interested in group therapy or medication management.

## 2025-01-30 ENCOUNTER — ANESTHESIA EVENT (OUTPATIENT)
Dept: PERIOP | Facility: HOSPITAL | Age: 25
End: 2025-01-30
Payer: COMMERCIAL

## 2025-02-03 ENCOUNTER — TELEPHONE (OUTPATIENT)
Age: 25
End: 2025-02-03

## 2025-02-03 DIAGNOSIS — L02.91 ABSCESS: Primary | ICD-10-CM

## 2025-02-03 RX ORDER — CEPHALEXIN 500 MG/1
500 CAPSULE ORAL EVERY 6 HOURS SCHEDULED
Qty: 28 CAPSULE | Refills: 0 | Status: SHIPPED | OUTPATIENT
Start: 2025-02-03 | End: 2025-02-10

## 2025-02-03 NOTE — TELEPHONE ENCOUNTER
Patient calling in and asking for another round of Keflex 500 mg as her leg is really bothering her and its hard to walk     Her surgery is not for another week with Dr. Ortiz     Please send to [facility] pharmacy

## 2025-02-05 ENCOUNTER — APPOINTMENT (OUTPATIENT)
Dept: LAB | Facility: MEDICAL CENTER | Age: 25
End: 2025-02-05
Payer: COMMERCIAL

## 2025-02-05 DIAGNOSIS — D75.839 THROMBOCYTOSIS: ICD-10-CM

## 2025-02-05 LAB
BASOPHILS # BLD AUTO: 0.04 THOUSANDS/ÂΜL (ref 0–0.1)
BASOPHILS NFR BLD AUTO: 0 % (ref 0–1)
EOSINOPHIL # BLD AUTO: 0.14 THOUSAND/ÂΜL (ref 0–0.61)
EOSINOPHIL NFR BLD AUTO: 1 % (ref 0–6)
ERYTHROCYTE [DISTWIDTH] IN BLOOD BY AUTOMATED COUNT: 15.4 % (ref 11.6–15.1)
HCT VFR BLD AUTO: 35.4 % (ref 34.8–46.1)
HGB BLD-MCNC: 11 G/DL (ref 11.5–15.4)
IMM GRANULOCYTES # BLD AUTO: 0.05 THOUSAND/UL (ref 0–0.2)
IMM GRANULOCYTES NFR BLD AUTO: 0 % (ref 0–2)
LYMPHOCYTES # BLD AUTO: 4.06 THOUSANDS/ÂΜL (ref 0.6–4.47)
LYMPHOCYTES NFR BLD AUTO: 36 % (ref 14–44)
MCH RBC QN AUTO: 27 PG (ref 26.8–34.3)
MCHC RBC AUTO-ENTMCNC: 31.1 G/DL (ref 31.4–37.4)
MCV RBC AUTO: 87 FL (ref 82–98)
MONOCYTES # BLD AUTO: 0.9 THOUSAND/ÂΜL (ref 0.17–1.22)
MONOCYTES NFR BLD AUTO: 8 % (ref 4–12)
NEUTROPHILS # BLD AUTO: 5.95 THOUSANDS/ÂΜL (ref 1.85–7.62)
NEUTS SEG NFR BLD AUTO: 55 % (ref 43–75)
NRBC BLD AUTO-RTO: 0 /100 WBCS
PLATELET # BLD AUTO: 764 THOUSANDS/UL (ref 149–390)
PMV BLD AUTO: 9.9 FL (ref 8.9–12.7)
RBC # BLD AUTO: 4.07 MILLION/UL (ref 3.81–5.12)
WBC # BLD AUTO: 11.14 THOUSAND/UL (ref 4.31–10.16)

## 2025-02-05 PROCEDURE — 85025 COMPLETE CBC W/AUTO DIFF WBC: CPT

## 2025-02-05 PROCEDURE — 36415 COLL VENOUS BLD VENIPUNCTURE: CPT

## 2025-02-06 DIAGNOSIS — D75.839 THROMBOCYTOSIS: Primary | ICD-10-CM

## 2025-02-06 DIAGNOSIS — D50.9 IRON DEFICIENCY ANEMIA, UNSPECIFIED IRON DEFICIENCY ANEMIA TYPE: ICD-10-CM

## 2025-02-06 NOTE — PRE-PROCEDURE INSTRUCTIONS
Pre-Surgery Instructions:   Medication Instructions    ferrous sulfate 324 (65 Fe) mg Hold day of surgery.    hydrOXYzine HCL (ATARAX) 25 mg tablet Uses PRN- OK to take day of surgery        Medication instructions for day surgery reviewed. Please use only a sip of water to take your instructed medications. Avoid all over the counter vitamins, supplements and NSAIDS for one week prior to surgery per anesthesia guidelines. Tylenol is ok to take as needed.     You will receive a call one business day prior to surgery with an arrival time and hospital directions. If your surgery is scheduled on a Monday, the hospital will be calling you on the Friday prior to your surgery. If you have not heard from anyone by 8pm, please call the hospital supervisor through the hospital  at [phone].     Do not eat or drink anything after midnight the night before your surgery, including candy, mints, lifesavers, or chewing gum. Do not drink alcohol 24hrs before your surgery. Try not to smoke at least 24hrs before your surgery.       Follow the pre surgery showering instructions as listed in the “My Surgical Experience Booklet” or otherwise provided by your surgeon's office. Do not use a blade to shave the surgical area 1 week before surgery. It is okay to use a clean electric clippers up to 24 hours before surgery. Do not apply any lotions, creams, including makeup, cologne, deodorant, or perfumes after showering on the day of your surgery. Do not use dry shampoo, hair spray, hair gel, or any type of hair products.     No contact lenses, eye make-up, or artificial eyelashes. Remove nail polish, including gel polish, and any artificial, gel, or acrylic nails if possible. Remove all jewelry including rings and body piercing jewelry.     Wear causal clothing that is easy to take on and off. Consider your type of surgery.    Keep any valuables, jewelry, piercings at home. Please bring any specially ordered equipment (sling,  braces) if indicated.    Arrange for a responsible person to drive you to and from the hospital on the day of your surgery. Please confirm the visitor policy for the day of your procedure when you receive your phone call with an arrival time.     Call the surgeon's office with any new illnesses, exposures, or additional questions prior to surgery.    Please reference your “My Surgical Experience Booklet” for additional information to prepare for your upcoming surgery.

## 2025-02-07 ENCOUNTER — TELEPHONE (OUTPATIENT)
Dept: SURGERY | Facility: CLINIC | Age: 25
End: 2025-02-07

## 2025-02-07 NOTE — TELEPHONE ENCOUNTER
Patient of . Is scheduled for EXCISION RECURRENT LEFT GROIN CYST (Left: Groin) on 2/10/25. Calling with questions related to upcoming surgery regarding labs, pre op instructions, and antibiotic(Keflex)that she was on but stopped. Call transferred to office. To speak with Keri, surgery scheduler with her questions.

## 2025-02-10 ENCOUNTER — ANESTHESIA (OUTPATIENT)
Dept: PERIOP | Facility: HOSPITAL | Age: 25
End: 2025-02-10
Payer: COMMERCIAL

## 2025-02-12 RX ORDER — SODIUM CHLORIDE, SODIUM LACTATE, POTASSIUM CHLORIDE, CALCIUM CHLORIDE 600; 310; 30; 20 MG/100ML; MG/100ML; MG/100ML; MG/100ML
125 INJECTION, SOLUTION INTRAVENOUS CONTINUOUS
Status: CANCELLED | OUTPATIENT
Start: 2025-02-13

## 2025-02-13 ENCOUNTER — TELEPHONE (OUTPATIENT)
Age: 25
End: 2025-02-13

## 2025-02-13 ENCOUNTER — HOSPITAL ENCOUNTER (OUTPATIENT)
Facility: HOSPITAL | Age: 25
Setting detail: OUTPATIENT SURGERY
Discharge: HOME | End: 2025-02-13
Attending: SURGERY | Admitting: SURGERY
Payer: COMMERCIAL

## 2025-02-13 VITALS
DIASTOLIC BLOOD PRESSURE: 58 MMHG | TEMPERATURE: 97.5 F | RESPIRATION RATE: 12 BRPM | BODY MASS INDEX: 34.21 KG/M2 | HEART RATE: 74 BPM | OXYGEN SATURATION: 98 % | SYSTOLIC BLOOD PRESSURE: 102 MMHG | WEIGHT: 193.12 LBS

## 2025-02-13 DIAGNOSIS — R19.09 LEFT GROIN MASS: ICD-10-CM

## 2025-02-13 DIAGNOSIS — D75.839 THROMBOCYTOSIS: Primary | ICD-10-CM

## 2025-02-13 LAB
EXT PREGNANCY TEST URINE: NEGATIVE
EXT. CONTROL: NORMAL

## 2025-02-13 PROCEDURE — 11404 EXC TR-EXT B9+MARG 3.1-4 CM: CPT | Performed by: SURGERY

## 2025-02-13 PROCEDURE — NC001 PR NO CHARGE: Performed by: SURGERY

## 2025-02-13 PROCEDURE — NC001 PR NO CHARGE: Performed by: PHYSICIAN ASSISTANT

## 2025-02-13 PROCEDURE — 11403 EXC TR-EXT B9+MARG 2.1-3CM: CPT | Performed by: SURGERY

## 2025-02-13 PROCEDURE — 81025 URINE PREGNANCY TEST: CPT | Performed by: SURGERY

## 2025-02-13 PROCEDURE — 88305 TISSUE EXAM BY PATHOLOGIST: CPT | Performed by: PATHOLOGY

## 2025-02-13 PROCEDURE — 12032 INTMD RPR S/A/T/EXT 2.6-7.5: CPT | Performed by: SURGERY

## 2025-02-13 PROCEDURE — 88304 TISSUE EXAM BY PATHOLOGIST: CPT | Performed by: PATHOLOGY

## 2025-02-13 RX ORDER — KETOROLAC TROMETHAMINE 30 MG/ML
INJECTION, SOLUTION INTRAMUSCULAR; INTRAVENOUS AS NEEDED
Status: DISCONTINUED | OUTPATIENT
Start: 2025-02-13 | End: 2025-02-13

## 2025-02-13 RX ORDER — SODIUM CHLORIDE, SODIUM LACTATE, POTASSIUM CHLORIDE, CALCIUM CHLORIDE 600; 310; 30; 20 MG/100ML; MG/100ML; MG/100ML; MG/100ML
125 INJECTION, SOLUTION INTRAVENOUS CONTINUOUS
Status: DISCONTINUED | OUTPATIENT
Start: 2025-02-13 | End: 2025-02-13 | Stop reason: HOSPADM

## 2025-02-13 RX ORDER — ACETAMINOPHEN 325 MG/1
650 TABLET ORAL EVERY 6 HOURS PRN
Status: DISCONTINUED | OUTPATIENT
Start: 2025-02-13 | End: 2025-02-13 | Stop reason: HOSPADM

## 2025-02-13 RX ORDER — FENTANYL CITRATE 50 UG/ML
50 INJECTION, SOLUTION INTRAMUSCULAR; INTRAVENOUS
Status: DISCONTINUED | OUTPATIENT
Start: 2025-02-13 | End: 2025-02-13 | Stop reason: HOSPADM

## 2025-02-13 RX ORDER — CEFAZOLIN SODIUM 2 G/50ML
2000 SOLUTION INTRAVENOUS ONCE
Status: COMPLETED | OUTPATIENT
Start: 2025-02-13 | End: 2025-02-13

## 2025-02-13 RX ORDER — ONDANSETRON 2 MG/ML
INJECTION INTRAMUSCULAR; INTRAVENOUS AS NEEDED
Status: DISCONTINUED | OUTPATIENT
Start: 2025-02-13 | End: 2025-02-13

## 2025-02-13 RX ORDER — FENTANYL CITRATE/PF 50 MCG/ML
25 SYRINGE (ML) INJECTION
Status: DISCONTINUED | OUTPATIENT
Start: 2025-02-13 | End: 2025-02-13 | Stop reason: HOSPADM

## 2025-02-13 RX ORDER — PROPOFOL 10 MG/ML
INJECTION, EMULSION INTRAVENOUS AS NEEDED
Status: DISCONTINUED | OUTPATIENT
Start: 2025-02-13 | End: 2025-02-13

## 2025-02-13 RX ORDER — DEXAMETHASONE SODIUM PHOSPHATE 10 MG/ML
INJECTION, SOLUTION INTRAMUSCULAR; INTRAVENOUS AS NEEDED
Status: DISCONTINUED | OUTPATIENT
Start: 2025-02-13 | End: 2025-02-13

## 2025-02-13 RX ORDER — HYDROMORPHONE HCL/PF 1 MG/ML
0.5 SYRINGE (ML) INJECTION
Status: DISCONTINUED | OUTPATIENT
Start: 2025-02-13 | End: 2025-02-13 | Stop reason: HOSPADM

## 2025-02-13 RX ORDER — OXYCODONE HYDROCHLORIDE 5 MG/1
5 TABLET ORAL EVERY 4 HOURS PRN
Qty: 8 TABLET | Refills: 0 | Status: SHIPPED | OUTPATIENT
Start: 2025-02-13 | End: 2025-02-23

## 2025-02-13 RX ORDER — ONDANSETRON 2 MG/ML
4 INJECTION INTRAMUSCULAR; INTRAVENOUS ONCE AS NEEDED
Status: DISCONTINUED | OUTPATIENT
Start: 2025-02-13 | End: 2025-02-13 | Stop reason: HOSPADM

## 2025-02-13 RX ORDER — FENTANYL CITRATE 50 UG/ML
INJECTION, SOLUTION INTRAMUSCULAR; INTRAVENOUS AS NEEDED
Status: DISCONTINUED | OUTPATIENT
Start: 2025-02-13 | End: 2025-02-13

## 2025-02-13 RX ORDER — ONDANSETRON 2 MG/ML
4 INJECTION INTRAMUSCULAR; INTRAVENOUS EVERY 6 HOURS PRN
Status: DISCONTINUED | OUTPATIENT
Start: 2025-02-13 | End: 2025-02-13 | Stop reason: HOSPADM

## 2025-02-13 RX ORDER — LIDOCAINE HYDROCHLORIDE 20 MG/ML
INJECTION, SOLUTION EPIDURAL; INFILTRATION; INTRACAUDAL; PERINEURAL AS NEEDED
Status: DISCONTINUED | OUTPATIENT
Start: 2025-02-13 | End: 2025-02-13

## 2025-02-13 RX ORDER — MAGNESIUM HYDROXIDE 1200 MG/15ML
LIQUID ORAL AS NEEDED
Status: DISCONTINUED | OUTPATIENT
Start: 2025-02-13 | End: 2025-02-13 | Stop reason: HOSPADM

## 2025-02-13 RX ORDER — OXYCODONE HYDROCHLORIDE 5 MG/1
5 TABLET ORAL EVERY 4 HOURS PRN
Status: DISCONTINUED | OUTPATIENT
Start: 2025-02-13 | End: 2025-02-13 | Stop reason: HOSPADM

## 2025-02-13 RX ORDER — MIDAZOLAM HYDROCHLORIDE 2 MG/2ML
INJECTION, SOLUTION INTRAMUSCULAR; INTRAVENOUS AS NEEDED
Status: DISCONTINUED | OUTPATIENT
Start: 2025-02-13 | End: 2025-02-13

## 2025-02-13 RX ADMIN — KETOROLAC TROMETHAMINE 30 MG: 30 INJECTION, SOLUTION INTRAMUSCULAR; INTRAVENOUS at 16:46

## 2025-02-13 RX ADMIN — FENTANYL CITRATE 25 MCG: 50 INJECTION INTRAMUSCULAR; INTRAVENOUS at 16:44

## 2025-02-13 RX ADMIN — FENTANYL CITRATE 50 MCG: 50 INJECTION INTRAMUSCULAR; INTRAVENOUS at 16:03

## 2025-02-13 RX ADMIN — FENTANYL CITRATE 50 MCG: 50 INJECTION INTRAMUSCULAR; INTRAVENOUS at 16:23

## 2025-02-13 RX ADMIN — LIDOCAINE HYDROCHLORIDE 100 MG: 20 INJECTION, SOLUTION EPIDURAL; INFILTRATION; INTRACAUDAL at 16:03

## 2025-02-13 RX ADMIN — DEXAMETHASONE SODIUM PHOSPHATE 10 MG: 10 INJECTION INTRAMUSCULAR; INTRAVENOUS at 16:03

## 2025-02-13 RX ADMIN — PROPOFOL 200 MG: 10 INJECTION, EMULSION INTRAVENOUS at 16:03

## 2025-02-13 RX ADMIN — SODIUM CHLORIDE, SODIUM LACTATE, POTASSIUM CHLORIDE, AND CALCIUM CHLORIDE: .6; .31; .03; .02 INJECTION, SOLUTION INTRAVENOUS at 15:56

## 2025-02-13 RX ADMIN — CEFAZOLIN SODIUM 2000 MG: 2 SOLUTION INTRAVENOUS at 15:57

## 2025-02-13 RX ADMIN — FENTANYL CITRATE 50 MCG: 50 INJECTION INTRAMUSCULAR; INTRAVENOUS at 16:12

## 2025-02-13 RX ADMIN — ONDANSETRON 4 MG: 2 INJECTION INTRAMUSCULAR; INTRAVENOUS at 16:03

## 2025-02-13 RX ADMIN — MIDAZOLAM 2 MG: 1 INJECTION INTRAMUSCULAR; INTRAVENOUS at 15:56

## 2025-02-13 RX ADMIN — FENTANYL CITRATE 25 MCG: 50 INJECTION INTRAMUSCULAR; INTRAVENOUS at 16:43

## 2025-02-13 NOTE — ANESTHESIA POSTPROCEDURE EVALUATION
Post-Op Assessment Note    CV Status:  Stable    Pain management: adequate       Mental Status:  Alert and awake   Hydration Status:  Euvolemic   PONV Controlled:  Controlled   Airway Patency:  Patent     Post Op Vitals Reviewed: Yes    No anethesia notable event occurred.    Staff: Anesthesiologist           Last Filed PACU Vitals:  Vitals Value Taken Time   Temp 97.5 °F (36.4 °C) 02/13/25 1657   Pulse 77 02/13/25 1712   /73 02/13/25 1712   Resp 17 02/13/25 1712   SpO2 96 % 02/13/25 1712       Modified Mary:     Vitals Value Taken Time   Activity 2 02/13/25 1712   Respiration 2 02/13/25 1712   Circulation 2 02/13/25 1712   Consciousness 1 02/13/25 1712   Oxygen Saturation 2 02/13/25 1712     Modified Mary Score: 9

## 2025-02-13 NOTE — OP NOTE
OPERATIVE REPORT  PATIENT NAME: Mary Jane Reyes    :  2000  MRN: [id number]  Pt Location: [facility] OR ROOM 08    SURGERY DATE: 2025    Surgeons and Role:     * Adryan Ortiz MD - Primary     * Jona Godfrey MD - Assisting     * Eliz Phelan PA-C - Assisting    Preop Diagnosis:  Left groin mass [R19.09]    Post-Op Diagnosis Codes:     * Left groin mass [R19.09]    Procedure(s):  Bilateral - EXCISION RECURRENT B/L GROIN CYST    Specimen(s):  ID Type Source Tests Collected by Time Destination   1 : Left Groin Cyst Tissue Cyst TISSUE EXAM Adryan Ortiz MD 2025    2 : Right Groin Cyst Tissue Cyst TISSUE EXAM Adryan Ortiz MD 2025        Estimated Blood Loss:   Minimal    Drains:  * No LDAs found *    Anesthesia Type:   IV Sedation with Anesthesia    Operative Indications:  Left groin mass [R19.09]      Operative Findings:  Bilateral chronic nonhealing cyst wounds.      Complications:   None    Procedure and Technique:  The patient was seen again in the Holding Room. The risks, benefits, complications, treatment options, and expected outcomes were discussed with the patient.  The patient and/or family concurred with the proposed plan, giving informed consent. The site of surgery properly noted/marked. The patient was taken to Operating Room, identified as Mary Jane Reyes  and the procedure verified. A Time Out was held after prepping and draping in sterile fashion.  The above information was confirmed.    Attention was turned to the left side first.  A mixture 1% lidocaine with epinephrine and 0.5% Marcaine was used.  An elliptical incision was made with a 15 blade scalpel.  The incision measured 3 cm in length by 1.5 cm in width.  The skin and the underlying nonhealing wound cystic structure was excised in its entirety using Bovie cautery.  The excision was taken back to viable tissue.  Dissection plane was deep into subcutaneous tissue.  Specimen was sent to  pathology.  The wound was closed in layers of 3-0 Vicryl followed by 4 Monocryl and glue.    Attention was turned to the right groin in the process repeated.  Local anesthetic was used first.  An elliptical incision was made around the lesion.  On the right side was about 3.5 cm x 1.5 cm.  All the inflamed nonhealing tissue was excised using Bovie cautery en bloc.  The plane was taken back to normal subtenons tissue.  The plane dissected deep into the subcutaneous tissue and the skin were removed.  The wound was closed with 3-0 Vicryl for Monocryl and glue.         I was present for the entire procedure.    Patient Disposition:  PACU                SIGNATURE: Adryan Ortiz MD  DATE: February 13, 2025  TIME: 4:46 PM

## 2025-02-13 NOTE — ANESTHESIA PREPROCEDURE EVALUATION
Procedure:  EXCISION RECURRENT LEFT GROIN CYST (Left: Groin)    Relevant Problems   HEMATOLOGY   (+) Iron deficiency anemia secondary to inadequate dietary iron intake      NEURO/PSYCH   (+) Anxiety   (+) Depression complicating pregnancy in third trimester, antepartum      Behavioral Health   (+) Bipolar depression (HCC)   (+) Bipolar disease during pregnancy in third trimester (HCC)   (+) Medical marijuana use        Physical Exam    Airway    Mallampati score: II  TM Distance: >3 FB  Neck ROM: full     Dental   No notable dental hx     Cardiovascular  Rhythm: regular, Rate: normal, Cardiovascular exam normal    Pulmonary  Pulmonary exam normal     Other Findings  post-pubertal.      Anesthesia Plan  ASA Score- 2     Anesthesia Type- general with ASA Monitors.         Additional Monitors:     Airway Plan: LMA.           Plan Factors-    Chart reviewed.   Existing labs reviewed. Patient summary reviewed.                  Induction- intravenous.    Postoperative Plan-         Informed Consent- Anesthetic plan and risks discussed with patient.        NPO Status:  Vitals Value Taken Time   Date of last liquid 02/12/25 02/13/25 1527   Time of last liquid 2359 02/13/25 1527   Date of last solid 02/12/25 02/13/25 1527   Time of last solid 2200 02/13/25 1527

## 2025-02-13 NOTE — TELEPHONE ENCOUNTER
Contacted patient off of Medication Management  and Talk Therapy  wait list to verify needs of services in attempts to update list with patient preferences. Spoke w. Patient and verified  and name. Writer asked patient if services for mm and tt were still needed then line was disconnected not by writer. Writer attempted to return call and lvm for patient to contact intake dept.

## 2025-02-13 NOTE — H&P
" History & Physical    Mary Jane Reyes    24 y.o.  female  [id number]  Surgeon:Adryan Ortiz MD  Date: 2025    Assessment:  Patient Active Problem List   Diagnosis    Rh negative status in grady pregnancy in third trimester    Depression complicating pregnancy in third trimester, antepartum    Herpes genitalia    Bipolar depression (HCC)    Anxiety    History of hyperemesis gravidarum    39 weeks gestation of pregnancy    Anxiety during pregnancy    Bipolar disease during pregnancy in third trimester (HCC)    Iron deficiency anemia secondary to inadequate dietary iron intake    Medical marijuana use     (spontaneous vaginal delivery)    Gestational hypertension     Plan:  Mary Jane Reyes is scheduled for excision of a recurrent epidermal cyst in the left groin.  She states she also has a similar cyst on the right groin that she would be interested in excision as well.  Will therefore plan excision of both lesions.    HPI    Historical Information   Past Medical History:   Diagnosis Date    Anemia     Anxiety     Depression     [place] [facility] [place]    Herpes     only has had 1 outbreak    Miscarriage      Past Surgical History:   Procedure Laterality Date    CYST REMOVAL      inguinal cyst    WISDOM TOOTH EXTRACTION       Social History   Social History     Substance and Sexual Activity   Alcohol Use Not Currently     Social History     Substance and Sexual Activity   Drug Use Not Currently    Types: Marijuana    Comment: medical marijuana card;has not used in 3 weeks     Social History     Tobacco Use   Smoking Status Never   Smokeless Tobacco Never     Family History   Problem Relation Age of Onset    Bipolar disorder Mother     Depression Mother     Cancer Father         \"blood cancer\"    HIV Father     Other Brother         back issues    No Known Problems Maternal Grandmother     No Known Problems Maternal Grandfather     Cancer Paternal " Grandmother         colon        Meds/Allergies   Allergies   Allergen Reactions    Bactrim [Sulfamethoxazole-Trimethoprim] Itching    Amoxicillin Diarrhea    Iron Sucrose Diarrhea    Keflex [Cephalexin] GI Intolerance    Latex Itching    Nickel Itching     Skin turns green    Penicillins Diarrhea and Hives     Not 100% sure if the hives were from the antibiotics.          Current Facility-Administered Medications:     ceFAZolin (ANCEF) IVPB (premix in dextrose) 2,000 mg 50 mL, 2,000 mg, Intravenous, Once, Eliz Phelan PA-C    lactated ringers infusion, 125 mL/hr, Intravenous, Continuous, Jelani Craig MD    lactated ringers infusion, 125 mL/hr, Intravenous, Continuous, Eliz Phelan PA-C    Review of Systems    Vitals:    02/13/25 1305   BP: 108/67   Pulse: 67   Resp: 16   Temp: 97.8 °F (36.6 °C)   SpO2: 95%     Physical Exam  GEN: NAD, A+OX3   HEENT: Normocephalic, atraumatic,   NECK: Supple, trachea midline,   CARDIAC: regular rate & rhythm, S1 & S2 normal.   LUNGS: Clear to auscultation, No Wheeze, Rales, or Rhonchi  ABDOMEN:  EXTREMITIES: No evidence of cyanosis, clubbing or edema. Pulses +2 B/L LE  NEURO: CN II-XII intact grossly, No sensory or motor deficits    Lab Results:   Imaging:   EKG, Pathology, and Other Studies:   Lab Results   Component Value Date    CALCIUM 8.7 09/24/2024    K 3.9 09/24/2024    CO2 20 (L) 09/24/2024     09/24/2024    BUN 6 09/24/2024    CREATININE 0.48 (L) 09/24/2024     Lab Results   Component Value Date    WBC 11.14 (H) 02/05/2025    HGB 11.0 (L) 02/05/2025    HCT 35.4 02/05/2025    MCV 87 02/05/2025     (H) 02/05/2025     Lab Results   Component Value Date    ALT 14 09/24/2024    AST 16 09/24/2024    ALKPHOS 229 (H) 09/24/2024

## 2025-02-13 NOTE — PROGRESS NOTES
Pt unable to remove septum piercing and RN attempt as well. Dr. Pena aware and ok to proceed. Pt will sign [facility] waiver.

## 2025-02-13 NOTE — DISCHARGE INSTR - AVS FIRST PAGE
[facility]     Post-Operative Care Instructions     Dr. Adryan Ortiz MD, [facility]     [phone]          1. General: You will feel pulling sensations around the wound or funny aches and pains around the incisions. This is normal. Even minor surgery is a change in your body and this is your body’s way of reacting to it. If you have had abdominal surgery, it may help to support the incision with a small pillow or blanket for comfort when moving or coughing.     2. Wound care:  Okay to shower.  The glue will fall off over the next week or 2.   Use ice for at least the 1st 48 hours.  Do not use for longer than 20 minutes at a time. Use 3 times per day.     3. Water: You may shower over the wounds. Do not bathe or use a pool or hot tub until cleared by the physician.   If you were discharged with a drain, make sure drain site is covered with plastic wrap before showering.      4. Activity: You may go up and down stairs, walk as much as you are comfortable, but walk at least 3 times each day. If you have had abdominal surgery, do not lift anything heavier than 20 pounds for at least 4 weeks.      5. Diet: You may resume a regular diet. If you had a same-day surgery or overnight stay surgery, you may wish to eat lightly for a few days: soups, crackers, and sandwiches. You may resume a regular diet when ready.      6. Medications: Resume all of your previous medications, unless told otherwise by the doctor. Avoid aspirin products for 2-3 days after the date of surgery. You may, at that time, began to take them again. Use Tylenol and Ibuprofen for pain control.  You may alternate these medications every 3 hours.  For example: you may take Tylenol at noon, Ibuprofen at 3:00 p.m., and Tylenol again at 6:00 p.m., etc. You should use ice to assist with pain control as above.  You do not need to take narcotic pain medication unless you are having significant pain.   If you were prescribed a  narcotic pain medication containing Tylenol, such as Percocet or Norco, do not use supplemental Tylenol.      7. Driving: You will need someone to drive you home on the day of surgery or discharge. Do not drive or make any important decisions while on narcotic pain medication or 24 hours and after anesthesia or sedation for surgery. Generally, you may drive when your off all narcotic pain medications and you are comfortable.      8. Upset Stomach: You may take Maalox, Tums, or similar items for an upset stomach. If your narcotic pain medication causes an upset stomach, do not take it on an empty stomach. Try taking it with at least some crackers or toast.      9. Constipation: Patients often experience constipation after surgery. You may take over-the-counter medication for this, such as Metamucil, Senokot, Dulcolax, milk of magnesia, etc. You may take a suppository unless you have had anorectal surgery such as a procedure on your hemorrhoids. If you experience significant nausea or vomiting after abdominal surgery, call the office before trying any of these medications.     10. Call the office: If you are experiencing any of the following: fevers above 101.5°, significant nausea or vomiting, if the wound develops drainage and/or there is excessive redness around the wound, or if you have significant diarrhea or other worsening symptoms.     11. Pain: You may be given a prescription for pain medication.  This will be sent to your pharmacy prior to discharge.     12. Sexual Activity: You may resume sexual activity when you feel ready and comfortable and your incision is sealed and healed without apparent infection risk.     13. Urination: If you have not urinated in 6 hours, go directly to the ER for evaluation for urinary retention.      14. Follow-up in 2 weeks.

## 2025-02-13 NOTE — ANESTHESIA POSTPROCEDURE EVALUATION
Post-Op Assessment Note    CV Status:  Stable  Pain Score: 0    Pain management: adequate    Multimodal analgesia used between 6 hours prior to anesthesia start to PACU discharge    Mental Status:  Awake   Hydration Status:  Stable   PONV Controlled:  None   Airway Patency:  Patent     Post Op Vitals Reviewed: Yes    No anethesia notable event occurred.    Staff: CRNA           Last Filed PACU Vitals:  Vitals Value Taken Time   Temp 97.5 °F (36.4 °C) 02/13/25 1657   Pulse 98 02/13/25 1701   /57 02/13/25 1657   Resp 15 02/13/25 1657   SpO2 93 % 02/13/25 1701   Vitals shown include unfiled device data.    Modified Mary:     Vitals Value Taken Time   Activity 2 02/13/25 1657   Respiration 2 02/13/25 1657   Circulation 2 02/13/25 1657   Consciousness 1 02/13/25 1657   Oxygen Saturation 2 02/13/25 1657     Modified Mary Score: 9

## 2025-02-17 PROCEDURE — 88304 TISSUE EXAM BY PATHOLOGIST: CPT | Performed by: PATHOLOGY

## 2025-02-17 PROCEDURE — 88305 TISSUE EXAM BY PATHOLOGIST: CPT | Performed by: PATHOLOGY

## 2025-02-18 ENCOUNTER — RESULTS FOLLOW-UP (OUTPATIENT)
Dept: SURGERY | Facility: CLINIC | Age: 25
End: 2025-02-18

## 2025-02-19 NOTE — TELEPHONE ENCOUNTER
----- Message from Carmen LACKEY sent at 2/18/2025  3:13 PM EST -----    ----- Message -----  From: Eliz Phelan PA-C  Sent: 2/18/2025   8:01 AM EST  To: [facility]    Please let her know the pathology shows epidermoid cysts bilaterally. Can discuss further at postop visit.

## 2025-02-20 NOTE — PROGRESS NOTES
Assessment/Plan:   Mary Jane Reyes is a 25 y.o.female who comes in today for postoperative check after bilateral groin cyst excision in the OR with Dr. Ortiz on 2/13/25.        Pathology: Reviewed with patient, all questions answered.   Final Diagnosis   A. 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     B. Skin, Right Groin:  EPIDERMOID CYST, RUPTURED, WITH SURROUNDING INFLAMMATION     Patient has a small opening on left and right groin cyst excision. All clean with no signs of infection. Supplies given to cover the draining side  (left). Follow up in 1-2 weeks for wound check.   Postoperative restrictions reviewed. All questions answered.       ______________________________________________________  HPI:  Mary Jane Reyes is a 25 y.o.female who comes in today for postoperative check after recent bilateral groin cyst excision in the OR with Dr. Ortiz on 2/13/25.    Currently doing well with some problems : the left side is open and draining with no pain nausea vomiting or fevers, no fever or chills,no nausea and no vomiting.    Reports no other issues.    ROS:  General ROS: negative for - chills, fatigue, fever or night sweats, weight loss  Respiratory ROS: no cough, shortness of breath, or wheezing  Cardiovascular ROS: no chest pain or dyspnea on exertion  Genito-Urinary ROS: no dysuria, trouble voiding, or hematuria  Musculoskeletal ROS: negative for - gait disturbance, joint pain or muscle pain  Neurological ROS: no TIA or stroke symptoms  GI ROS: see HPI  Skin ROS: no new rashes or lesions   Lymphatic ROS: no new adenopathy noted by pt.   GYN ROS: see HPI, no new GYN history or bleeding noted  Psy ROS: no new mental or behavioral disturbances         Patient Active Problem List   Diagnosis    Rh negative status in grady pregnancy in third trimester    Depression  "complicating pregnancy in third trimester, antepartum    Herpes genitalia    Bipolar depression (HCC)    Anxiety    History of hyperemesis gravidarum    39 weeks gestation of pregnancy    Anxiety during pregnancy    Bipolar disease during pregnancy in third trimester (HCC)    Iron deficiency anemia secondary to inadequate dietary iron intake    Medical marijuana use     (spontaneous vaginal delivery)    Gestational hypertension       Allergies:  Bactrim [sulfamethoxazole-trimethoprim], Amoxicillin, Iron sucrose, Keflex [cephalexin], Latex, Nickel, and Penicillins      Current Outpatient Medications:     ferrous sulfate 324 (65 Fe) mg, Take 1 tablet (324 mg total) by mouth daily before breakfast, Disp: 90 tablet, Rfl: 1    hydrOXYzine HCL (ATARAX) 25 mg tablet, Take 1 tablet (25 mg total) by mouth 3 (three) times a day as needed for itching Or anxiety, Disp: 30 tablet, Rfl: 2    Prenatal MV & Min w/FA-DHA (PRENATAL ADULT GUMMY/DHA/FA PO), Take by mouth, Disp: , Rfl:     Past Medical History:   Diagnosis Date    Anemia     Anxiety     Depression     [place] [facility] [place]    Herpes     only has had 1 outbreak    Miscarriage        Past Surgical History:   Procedure Laterality Date    CYST REMOVAL      inguinal cyst    UT EXC B9 LESION MRGN XCP SK TG S/N/H/F/G > 4.0CM Bilateral 2025    Procedure: EXCISION RECURRENT B/L GROIN CYST;  Surgeon: Adryan Ortiz MD;  Location: [facility] OR;  Service: General    WISDOM TOOTH EXTRACTION         Family History   Problem Relation Age of Onset    Bipolar disorder Mother     Depression Mother     Cancer Father         \"blood cancer\"    HIV Father     Other Brother         back issues    No Known Problems Maternal Grandmother     No Known Problems Maternal Grandfather     Cancer Paternal Grandmother         colon        reports that she has never smoked. She has never used smokeless tobacco. She reports that she does not currently use alcohol. She " "reports that she does not currently use drugs after having used the following drugs: Marijuana.    Vitals:    02/25/25 1012   BP: 130/84   Pulse: 80   Resp: 16   Temp: 99.1 °F (37.3 °C)   SpO2: 94%       PHYSICAL EXAM  General: normal, cooperative, no distress  Incision: Right groin incision is mostly closed with a small opening very superficial. No drainage. Left groin is closed at the bottom but open at the top with no drainage erythema or tenderness. Covered with non-adherent pad and tape.     Some portions of this record may have been generated with voice recognition software. There may be translation, syntax,  or grammatical errors. Occasional wrong word or \"sound-a-like\" substitutions may have occurred due to the inherent limitations of the voice recognition software. Read the chart carefully and recognize, using context, where substitutions may have occurred. If you have any questions, please contact the dictating provider for clarification or correction, as needed. This encounter has been coded by a non-certified coder.       Eliz Phelan PA-C    Date: 2/25/2025 Time: 10:28 AM   "

## 2025-02-21 ENCOUNTER — TELEPHONE (OUTPATIENT)
Dept: SURGERY | Facility: CLINIC | Age: 25
End: 2025-02-21

## 2025-02-21 NOTE — TELEPHONE ENCOUNTER
"Patient of Dr.Daniel Ortiz. Had EXCISION RECURRENT B/L GROIN CYST (Bilateral: Groin done on 2/13/25. Calling with concern. Has been having drainage from left groin incision over the last 2 to 3 days. Drainage is clear to pale yellow in color. Also noted to have an \"odor\"? Patient denies any fever or chills. No redness of skin at site. Will be sending a few photos over via her My Chart. Please advise. CB# [phone]  "

## 2025-02-25 ENCOUNTER — OFFICE VISIT (OUTPATIENT)
Dept: SURGERY | Facility: CLINIC | Age: 25
End: 2025-02-25

## 2025-02-25 VITALS
DIASTOLIC BLOOD PRESSURE: 84 MMHG | HEART RATE: 80 BPM | RESPIRATION RATE: 16 BRPM | OXYGEN SATURATION: 94 % | BODY MASS INDEX: 35.08 KG/M2 | SYSTOLIC BLOOD PRESSURE: 130 MMHG | TEMPERATURE: 99.1 F | HEIGHT: 63 IN | WEIGHT: 198 LBS

## 2025-02-25 DIAGNOSIS — Z09 POSTOP CHECK: Primary | ICD-10-CM

## 2025-02-25 PROCEDURE — 99024 POSTOP FOLLOW-UP VISIT: CPT | Performed by: PHYSICIAN ASSISTANT

## 2025-03-04 ENCOUNTER — TELEPHONE (OUTPATIENT)
Dept: SURGERY | Facility: CLINIC | Age: 25
End: 2025-03-04

## 2025-03-04 NOTE — TELEPHONE ENCOUNTER
Pt was a no show for today's appt Colton CRUZ Left message for pt asking her if she wants to reschedule to please call our office @ [phone].

## 2025-03-07 ENCOUNTER — TELEPHONE (OUTPATIENT)
Dept: HEMATOLOGY ONCOLOGY | Facility: CLINIC | Age: 25
End: 2025-03-07

## 2025-03-07 ENCOUNTER — OFFICE VISIT (OUTPATIENT)
Dept: HEMATOLOGY ONCOLOGY | Facility: CLINIC | Age: 25
End: 2025-03-07
Payer: COMMERCIAL

## 2025-03-07 VITALS
HEART RATE: 65 BPM | OXYGEN SATURATION: 99 % | SYSTOLIC BLOOD PRESSURE: 120 MMHG | BODY MASS INDEX: 34.55 KG/M2 | HEIGHT: 63 IN | RESPIRATION RATE: 18 BRPM | WEIGHT: 195 LBS | DIASTOLIC BLOOD PRESSURE: 70 MMHG | TEMPERATURE: 97.8 F

## 2025-03-07 DIAGNOSIS — D50.9 IRON DEFICIENCY ANEMIA, UNSPECIFIED IRON DEFICIENCY ANEMIA TYPE: Primary | ICD-10-CM

## 2025-03-07 DIAGNOSIS — D75.839 THROMBOCYTOSIS: ICD-10-CM

## 2025-03-07 PROCEDURE — 99244 OFF/OP CNSLTJ NEW/EST MOD 40: CPT | Performed by: PHYSICIAN ASSISTANT

## 2025-03-07 RX ORDER — SODIUM CHLORIDE 9 MG/ML
20 INJECTION, SOLUTION INTRAVENOUS ONCE
OUTPATIENT
Start: 2025-03-21

## 2025-03-07 RX ORDER — ONDANSETRON 8 MG/1
8 TABLET, FILM COATED ORAL EVERY 8 HOURS PRN
Qty: 20 TABLET | Refills: 0 | Status: SHIPPED | OUTPATIENT
Start: 2025-03-07

## 2025-03-07 RX ORDER — GABAPENTIN 100 MG/1
CAPSULE ORAL
COMMUNITY
Start: 2025-03-03

## 2025-03-07 RX ORDER — TOPIRAMATE 50 MG/1
1 TABLET, FILM COATED ORAL 2 TIMES DAILY
COMMUNITY
Start: 2025-03-03

## 2025-03-07 NOTE — ASSESSMENT & PLAN NOTE
This is a 25-year-old female who presenting today with iron deficiency anemia.    Unfortunately patient had a side effect reaction to Venofer plus skin discoloration related to infusion and does not feel comfortable continuing with Venofer.  At this time, considering that I do not know the connection of the yellowing compared to pregnancy versus the side effect of the medication, additional iron supplements were discussed today.  It was recommended that the patient trial Feraheme.    IV iron was recommended.   Two types of IV iron were discussed: Venofer and Feraheme.  Patient understands dosing and schedule differences between the two medications. We discussed potential side effects which include but are not limited to IV site reactions, tattooing, hypotension, arthralgias, headache, nausea, and anaphylaxis.  If patient experiences any side effects they are to call the office to discuss premedications are necessary for subsequent dosing.    Patient will be given premedications to assist in this process.  Antinausea medications were sent to the pharmacy as well as given his premedications.  Patient was given the opportunity ask questions.  She is comfortable with this management.  She will follow-up in approximately 3 months with repeat blood work.  In the future, we may consider the use of Venofer depending on her response to Feraheme.    Education provided with differences between side effects versus anaphylaxis.    Orders:    Ambulatory Referral to Hematology / Oncology    Iron Panel (Includes Ferritin, Iron Sat%, Iron, and TIBC); Future    CBC and differential; Future    CBC and differential; Future    ondansetron (ZOFRAN) 8 mg tablet; Take 1 tablet (8 mg total) by mouth every 8 (eight) hours as needed for nausea or vomiting

## 2025-03-07 NOTE — ASSESSMENT & PLAN NOTE
Lab Results   Component Value Date     (H) 02/05/2025     Patient has prolonged thrombocytosis which may be in fact due to prolonged iron deficiency anemia.  Patient's platelet count has elevated.  I have requested that she undergo additional laboratory testing approximately a month after completing iron supplementation.    If thrombocytosis persists or does not resolve with the supplementation of IV iron, then myeloproliferative workup would be necessary.    Orders:    Ambulatory Referral to Hematology / Oncology    Iron Panel (Includes Ferritin, Iron Sat%, Iron, and TIBC); Future    CBC and differential; Future    CBC and differential; Future    ondansetron (ZOFRAN) 8 mg tablet; Take 1 tablet (8 mg total) by mouth every 8 (eight) hours as needed for nausea or vomiting

## 2025-03-07 NOTE — PROGRESS NOTES
Name: Mary Jane Reyes      : 2000      MRN: [id number]  Encounter Provider: Judith Kapadia PA-C  Encounter Date: 3/7/2025   Encounter department: [facility]  :  Assessment & Plan  Iron deficiency anemia, unspecified iron deficiency anemia type  This is a 25-year-old female who presenting today with iron deficiency anemia.    Unfortunately patient had a side effect reaction to Venofer plus skin discoloration related to infusion and does not feel comfortable continuing with Venofer.  At this time, considering that I do not know the connection of the yellowing compared to pregnancy versus the side effect of the medication, additional iron supplements were discussed today.  It was recommended that the patient trial Feraheme.    IV iron was recommended.   Two types of IV iron were discussed: Venofer and Feraheme.  Patient understands dosing and schedule differences between the two medications. We discussed potential side effects which include but are not limited to IV site reactions, tattooing, hypotension, arthralgias, headache, nausea, and anaphylaxis.  If patient experiences any side effects they are to call the office to discuss premedications are necessary for subsequent dosing.    Patient will be given premedications to assist in this process.  Antinausea medications were sent to the pharmacy as well as given his premedications.  Patient was given the opportunity ask questions.  She is comfortable with this management.  She will follow-up in approximately 3 months with repeat blood work.  In the future, we may consider the use of Venofer depending on her response to Feraheme.    Education provided with differences between side effects versus anaphylaxis.    Orders:    Ambulatory Referral to Hematology / Oncology    Iron Panel (Includes Ferritin, Iron Sat%, Iron, and TIBC); Future    CBC and differential; Future    CBC and differential; Future     ondansetron (ZOFRAN) 8 mg tablet; Take 1 tablet (8 mg total) by mouth every 8 (eight) hours as needed for nausea or vomiting    Thrombocytosis  Lab Results   Component Value Date     (H) 02/05/2025     Patient has prolonged thrombocytosis which may be in fact due to prolonged iron deficiency anemia.  Patient's platelet count has elevated.  I have requested that she undergo additional laboratory testing approximately a month after completing iron supplementation.    If thrombocytosis persists or does not resolve with the supplementation of IV iron, then myeloproliferative workup would be necessary.    Orders:    Ambulatory Referral to Hematology / Oncology    Iron Panel (Includes Ferritin, Iron Sat%, Iron, and TIBC); Future    CBC and differential; Future    CBC and differential; Future    ondansetron (ZOFRAN) 8 mg tablet; Take 1 tablet (8 mg total) by mouth every 8 (eight) hours as needed for nausea or vomiting    Return in about 3 months (around 6/7/2025) for Labs, [place] [facility] Office Visit.    History of Present Illness   Chief Complaint   Patient presents with    Consult     Pertinent Medical History   This is a 25-year-old G3 female with past medical history of post partum depression, bipolar depression, gestational hypertension, history of hyperemesis gravidarum who presents to the hematology clinic for evaluation of iron deficiency anemia.      9/11/2018 WBC 11.2, hemoglobin 13.2, MCV 96, platelet count 330  11/6/2019 WBC 12.8, hemoglobin 12.0, hematocrit 3.4.8, platelet count 443   ANC 8.1  6/16/2023 WBC 13.1, hemoglobin 10.6, WBC 8.5, platelet count 479  2/10/2024 WBC 15.5, hemoglobin 14.4, hematocrit 31, platelet count 492  positive serum beta hCG    3/18/24 WBC 15.5, hemoglobin 11.4, MCV 95, platelet count 534  In August 2024 patient was given Venofer 200 mg IV.  Patient notes that she developed significant symptoms after IV iron administration which included yellowing of the skin and slightly  to the eyes, and approximately 12 hours later body aches and nausea.  Patient notes that she was also under the care of of obstetrics at the time secondary to concern for biliary stasis/cholestasis of pregnancy with itching of her skin.  Patient underwent several lab test that did not demonstrate this diagnosis.  Patient's bilirubin was also within normal limits.  Patient states that the yellowness resolved spontaneously within a week however the patient as far as a historian did state that it could have taken up to 2 weeks.  Subsequent appointments with physicians after receiving iron did not note yellowing at that time.  Patient notes that she is hesitant to try IV iron again due to this reason.    8/31/2024 ferritin = 11  9/24/2024 WBC 20.6, hemoglobin 9.6, MCV 89, platelet count of 588  9/25/2024 Delivery of a daughter-39 weeks and 2 days vaginal delivery  ----------------------------------------------------------------------------  1/10/2025 ferritin = 8  2/5/2025 WBC 11.14, hemoglobin 11.0, MCV 87, RDW 15.4, platelet count 764    03/07/25: Patient presents today with significant other.  She notes that she is hesitant to trial Venofer again.  We discussed that some of the side effects that she experienced are truly side effects of the Venofer however the yellowing of the skin is a little bit harder to explain.  In the absence of symptoms of biliary or cholestasis of pregnancy, it is hard to really make a judgment call on this..  Typically, localized areas of yellow or darkening skin darkening can be related to iron treatment but jaundice or yellow tint of the eyes seems to be slightly abnormal.  We discussed other treatment options including Feraheme.  Patient's comfort is to trial this medication.    Patient notes concentration deficits, fatigue     Review of Systems   Constitutional:  Positive for fatigue.   All other systems reviewed and are negative.    Medical History Reviewed by provider this encounter:   "Tobacco  Allergies  Meds  Problems  Med Hx  Surg Hx  Fam Hx     .      Objective   /70 (BP Location: Left arm, Patient Position: Sitting, Cuff Size: Adult)   Pulse 65   Temp 97.8 °F (36.6 °C)   Resp 18   Ht 5' 3\" (1.6 m)   Wt 88.5 kg (195 lb)   LMP 02/06/2025 (Exact Date)   SpO2 99%   Breastfeeding No   BMI 34.54 kg/m²     Physical Exam  Constitutional:       General: She is not in acute distress.     Appearance: She is well-developed.   HENT:      Head: Normocephalic and atraumatic.   Eyes:      General: No scleral icterus.     Conjunctiva/sclera: Conjunctivae normal.   Cardiovascular:      Rate and Rhythm: Normal rate.   Pulmonary:      Effort: Pulmonary effort is normal. No respiratory distress.   Skin:     General: Skin is warm.      Coloration: Skin is not pale.      Findings: No rash.   Neurological:      Mental Status: She is alert and oriented to person, place, and time.   Psychiatric:         Thought Content: Thought content normal.         Labs: I have reviewed the following labs:  Results for orders placed or performed during the hospital encounter of 02/13/25   POCT pregnancy, urine   Result Value Ref Range    EXT Preg Test, Ur Negative Negative    Control Valid Valid   Tissue Exam   Result Value Ref Range    Case Report       Surgical Pathology Report                         Case: [id number]                                  Authorizing Provider:  Adryan Ortiz MD         Collected:           02/13/2025 8728              Ordering Location:     [facility]        Received:            02/13/2025 [address] [place] [address] Operating Room                                                     Pathologist:           Zheng Mae MD                                                      Specimens:   A) - Cyst, Left Groin Cyst                                                                          B) - Cyst, Right Groin Cyst                             " "                                   Final Diagnosis       A. Skin, Left Groin:   ULCER AND PSEUDOCARCINOMATOUS HYPERPLASIA WITH FIBROSING GRANULATION TISSUE    Note: This could represent changes secondary to a ruptured cyst.    B. Skin, Right Groin:  EPIDERMOID CYST, RUPTURED, WITH SURROUNDING INFLAMMATION           Note       Interpretation performed at [facility] [address] [place] [address]        Additional Information       All reported additional testing was performed with appropriately reactive controls.  These tests were developed and their performance characteristics determined by [facility] or appropriate performing facility, though some tests may be performed on tissues which have not been validated for performance characteristics (such as staining performed on alcohol exposed cell blocks and decalcified tissues).  Results should be interpreted with caution and in the context of the patients’ clinical condition. These tests may not be cleared or approved by the U.S. Food and Drug Administration, though the FDA has determined that such clearance or approval is not necessary. These tests are used for clinical purposes and they should not be regarded as investigational or for research. This laboratory has been approved by CLIA 88, designated as a high-complexity laboratory and is qualified to perform these tests.  .      Gross Description       A. The specimen is received in formalin, labeled with the patient's name and hospital number, and is designated \"left groin cyst\".  The specimen consists of 1 tan nonhairbearing ellipse of skin measuring 1.8 x 0.9 cm with attached underlying soft tissue to depth of 1.3 cm.  The surface exhibits an consists of an area of defect measuring 0.5 x 0.2 cm and comes within 0.4 cm of peripheral margin.  The marginal resection is inked green and the skin surface tips are inked red.  The specimen is sectioned, and the area of defect " "appears to be a sinus tract or cystic cavity measuring 0.7 cm in greatest dimension.  The remaining cut surface is white-tan fibrous white-yellow fibrofatty.  Representative section(s). One cassette,  Including entire cystic cavity.  B. The specimen is received in formalin, labeled with the patient's name and hospital number, and is designated \"right groin cyst\".  The specimen consists of 1 tan focally hairbearing portion of skin measuring 2.2 x 1.0 cm with attached underlying soft tissue to depth of 1.2 cm.  The surface exhibits no abnormality.  The margin of resection is inked green and the skin surface tips are inked red.  The specimen section revealing white-tan fibrous to tan-yellow fibrofatty cut surfaces a definitive cystic structure is not identified.  Entirely submitted. Two cassettes, sequentially submitted.    Note: The estimated total formalin fixation time based upon information provided by the submitting clinician and the standard processing schedule is under 72 hours.  [place]equino      Clinical Information Cyst      Administrative Statements   I have spent a total time of 65 minutes in caring for this patient on the day of the visit/encounter including Instructions for management, Patient and family education, Counseling / Coordination of care, Documenting in the medical record, and Reviewing/placing orders in the medical record (including tests, medications, and/or procedures).  "

## 2025-03-07 NOTE — PATIENT INSTRUCTIONS
[facility] Medical Oncology and Hematology Team  [place] - [phone]    Your Team Member:  Advanced Practitioner:  Judith Kapadia PA-C    Please answer Private and Unavailable Calls - this may be your team(s) contacting you.  If you have medical questions/concerns/issues - contact us either by (1) My Chart (2) [facility]

## 2025-03-20 DIAGNOSIS — D50.9 IRON DEFICIENCY ANEMIA, UNSPECIFIED IRON DEFICIENCY ANEMIA TYPE: ICD-10-CM

## 2025-03-20 DIAGNOSIS — D75.839 THROMBOCYTOSIS: Primary | ICD-10-CM

## 2025-03-20 RX ORDER — DIPHENHYDRAMINE HCL 25 MG
25 TABLET ORAL ONCE
OUTPATIENT
Start: 2025-03-24 | End: 2025-03-24

## 2025-03-20 RX ORDER — SODIUM CHLORIDE 9 MG/ML
20 INJECTION, SOLUTION INTRAVENOUS ONCE
OUTPATIENT
Start: 2025-03-24

## 2025-03-24 ENCOUNTER — PATIENT MESSAGE (OUTPATIENT)
Dept: HEMATOLOGY ONCOLOGY | Facility: CLINIC | Age: 25
End: 2025-03-24

## 2025-03-24 ENCOUNTER — TELEPHONE (OUTPATIENT)
Age: 25
End: 2025-03-24

## 2025-03-24 DIAGNOSIS — L65.9 HAIR LOSS: ICD-10-CM

## 2025-03-24 DIAGNOSIS — Z32.01 POSITIVE PREGNANCY TEST: Primary | ICD-10-CM

## 2025-03-24 DIAGNOSIS — N91.2 AMENORRHEA: Primary | ICD-10-CM

## 2025-03-24 NOTE — TELEPHONE ENCOUNTER
Patient states she is scheduled for infusions and was recently told by hematologist that if she were to become pregnant infusions would not be able to be completed in first trimester. Patient states had two faint positive home UPTs this morning and wondering if we can order HCG to confirm.     Routing to provider for rec.

## 2025-03-25 ENCOUNTER — TELEPHONE (OUTPATIENT)
Dept: OBGYN CLINIC | Facility: CLINIC | Age: 25
End: 2025-03-25

## 2025-03-25 NOTE — TELEPHONE ENCOUNTER
Will place orders. Let her know I also got her message regarding labs. I'll add on thyroid and nutritional testing as well. There are a few other hormonal tests we can consider too, but they would be skewed if in fact pregnant so lets assess this first.

## 2025-03-25 NOTE — TELEPHONE ENCOUNTER
Left detailed msg for pt regarding  providers add ons.and that she can call if she has any questions.

## 2025-04-15 ENCOUNTER — ANNUAL EXAM (OUTPATIENT)
Dept: OBGYN CLINIC | Facility: MEDICAL CENTER | Age: 25
End: 2025-04-15
Payer: COMMERCIAL

## 2025-04-15 VITALS
BODY MASS INDEX: 33.72 KG/M2 | HEIGHT: 63 IN | SYSTOLIC BLOOD PRESSURE: 110 MMHG | WEIGHT: 190.3 LBS | DIASTOLIC BLOOD PRESSURE: 76 MMHG

## 2025-04-15 DIAGNOSIS — E61.1 IRON DEFICIENCY: ICD-10-CM

## 2025-04-15 DIAGNOSIS — Z01.419 WELL WOMAN EXAM WITH ROUTINE GYNECOLOGICAL EXAM: Primary | ICD-10-CM

## 2025-04-15 DIAGNOSIS — N89.8 VAGINAL DISCHARGE: ICD-10-CM

## 2025-04-15 DIAGNOSIS — N94.6 DYSMENORRHEA: ICD-10-CM

## 2025-04-15 DIAGNOSIS — B37.31 VULVOVAGINAL CANDIDIASIS: ICD-10-CM

## 2025-04-15 PROCEDURE — 87591 N.GONORRHOEAE DNA AMP PROB: CPT | Performed by: OBSTETRICS & GYNECOLOGY

## 2025-04-15 PROCEDURE — 87491 CHLMYD TRACH DNA AMP PROBE: CPT | Performed by: OBSTETRICS & GYNECOLOGY

## 2025-04-15 PROCEDURE — 99395 PREV VISIT EST AGE 18-39: CPT | Performed by: OBSTETRICS & GYNECOLOGY

## 2025-04-15 NOTE — PROGRESS NOTES
Assessment   25 y.o.  presenting for annual exam.     Plan   Diagnoses and all orders for this visit:    Well woman exam with routine gynecological exam  - Pap up to date  - Continue SBE  - Return in 1yr for yearly    Vaginal discharge  Vulvovaginal candidiasis  -     Chlamydia/GC amplified DNA by PCR  -     fluconazole (DIFLUCAN) 150 mg tablet; Take 1 tablet (150 mg total) by mouth once for 1 dose    Dysmenorrhea  -     naproxen (Naprosyn) 500 mg tablet; Take 1 tablet (500 mg total) by mouth 2 (two) times a day as needed (cramping)    Iron deficiency  -     ferrous sulfate 324 (65 Fe) mg; Take 1 tablet (324 mg total) by mouth daily before breakfast    __________________________________________________________________      Subjective     Mary Jane Reyes is a 25 y.o.  with regular menses who is sexually active and currently not using contraception presenting for annual exam.     Stringy discharge occasionally. No color change, odor, irritation.     GYN  Complaints: denies  Denies dysmenorrhea, dyspareunia, genital discharge, genital ulcers, irregular/heavy menses, pelvic pain, and vulvar/vaginal symptoms  Periods are regular every 28-30 days, lasting 4 days.  Dysmenorrhea:moderate, occurring first 1-2 days of flow.   Cyclic symptoms include none  Sexually active: Yes - single partner - male  Hx STI: hsv   Hx Abnormal pap: denies  Last pap:  - nilm    OB     Considering TTC      Complaints: denies  Denies urinary frequency, hematuria, urinary incontinence, and dysuria    BREAST  Complaints: denies  Denies: breast lump, breast tenderness, changed mole, dryness, nipple discharge, pruritus, rash, skin color change, and skin lesion(s)  Last mammogram: n/a  Personal hx: denies  Family hx: denies fhx of breast, uterine, ovarian cancers  Colon ca in grandmother  Patient does do regular self-exams    GENERAL  PMH reviewed/updated and is as below.   Patient does follow with a PCP.  Works as  "a , recently quit though  Denies domestic violence.    SCREENING  Cervical Ca: pap up to date   Breast Ca: continue sbe  Colon Ca: starting age 45      Past Medical History:   Diagnosis Date    Anemia     Anxiety     Depression     [place] [facility] [place]    Herpes     only has had 1 outbreak    Miscarriage        Past Surgical History:   Procedure Laterality Date    CYST REMOVAL      inguinal cyst    ND EXC B9 LESION MRGN XCP SK TG S/N/H/F/G > 4.0CM Bilateral 2/13/2025    Procedure: EXCISION RECURRENT B/L GROIN CYST;  Surgeon: Adryan Ortiz MD;  Location: [facility];  Service: General    WISDOM TOOTH EXTRACTION           Current Outpatient Medications:     gabapentin (NEURONTIN) 100 mg capsule, TAKE 1 CAPSULE(S) 3 TIMES A DAY AS NEEDED, Disp: , Rfl:     ondansetron (ZOFRAN) 8 mg tablet, Take 1 tablet (8 mg total) by mouth every 8 (eight) hours as needed for nausea or vomiting, Disp: 20 tablet, Rfl: 0    topiramate (TOPAMAX) 50 MG tablet, Take 1 tablet by mouth 2 (two) times a day, Disp: , Rfl:     Prenatal MV & Min w/FA-DHA (PRENATAL ADULT GUMMY/DHA/FA PO), Take by mouth (Patient not taking: Reported on 3/7/2025), Disp: , Rfl:     Allergies   Allergen Reactions    Bactrim [Sulfamethoxazole-Trimethoprim] Itching    Amoxicillin Diarrhea    Iron Sucrose Diarrhea    Keflex [Cephalexin] GI Intolerance    Latex Itching    Nickel Itching     Skin turns green    Penicillins Diarrhea and Hives     Not 100% sure if the hives were from the antibiotics.          Social History     Tobacco Use    Smoking status: Never    Smokeless tobacco: Never   Vaping Use    Vaping status: Every Day    Substances: Nicotine, THC   Substance Use Topics    Alcohol use: Not Currently    Drug use: Not Currently     Types: Marijuana     Comment: medical marijuana card;has not used in 3 weeks           Objective  /76   Ht 5' 3\" (1.6 m)   Wt 86.3 kg (190 lb 4.8 oz)   LMP 03/28/2025 (Exact Date)   BMI 33.71 " kg/m²      Physical Exam:  Physical Exam  Exam conducted with a chaperone present.   Constitutional:       General: She is not in acute distress.     Appearance: Normal appearance. She is well-developed. She is not ill-appearing, toxic-appearing or diaphoretic.   HENT:      Head: Normocephalic and atraumatic.   Eyes:      General: No scleral icterus.        Right eye: No discharge.         Left eye: No discharge.      Conjunctiva/sclera: Conjunctivae normal.   Cardiovascular:      Rate and Rhythm: Normal rate.   Pulmonary:      Effort: Pulmonary effort is normal. No accessory muscle usage or respiratory distress.   Chest:   Breasts:     Breasts are symmetrical.      Right: No inverted nipple, mass, nipple discharge, skin change or tenderness.      Left: No inverted nipple, mass, nipple discharge, skin change or tenderness.   Abdominal:      General: There is no distension.      Palpations: Abdomen is soft. There is no mass.      Tenderness: There is no abdominal tenderness. There is no guarding or rebound.   Genitourinary:     General: Normal vulva.      Exam position: Lithotomy position.      Labia:         Right: No rash, tenderness or lesion.         Left: No rash, tenderness or lesion.       Urethra: No prolapse, urethral swelling or urethral lesion.      Vagina: No signs of injury. Vaginal discharge (white, thick) present. No erythema, tenderness, bleeding or lesions.      Cervix: No cervical motion tenderness, discharge, friability, lesion or erythema.      Uterus: Not enlarged, not fixed and not tender.       Adnexa:         Right: No mass, tenderness or fullness.          Left: No mass, tenderness or fullness.        Rectum: No external hemorrhoid. Normal anal tone.      Comments: Microscopy consistent with yeast  Lymphadenopathy:      Upper Body:      Right upper body: No axillary or pectoral adenopathy.      Left upper body: No axillary or pectoral adenopathy.   Skin:     General: Skin is warm and dry.       Findings: No erythema or rash.   Neurological:      Mental Status: She is alert.   Psychiatric:         Mood and Affect: Mood normal.         Behavior: Behavior normal.         Thought Content: Thought content normal.         Judgment: Judgment normal.

## 2025-04-16 ENCOUNTER — TELEPHONE (OUTPATIENT)
Age: 25
End: 2025-04-16

## 2025-04-16 LAB
C TRACH DNA SPEC QL NAA+PROBE: NEGATIVE
N GONORRHOEA DNA SPEC QL NAA+PROBE: NEGATIVE

## 2025-04-16 RX ORDER — FLUCONAZOLE 150 MG/1
150 TABLET ORAL ONCE
Qty: 1 TABLET | Refills: 0 | Status: SHIPPED | OUTPATIENT
Start: 2025-04-16 | End: 2025-04-16

## 2025-04-16 RX ORDER — NAPROXEN 500 MG/1
500 TABLET ORAL 2 TIMES DAILY PRN
Qty: 50 TABLET | Refills: 0 | Status: SHIPPED | OUTPATIENT
Start: 2025-04-16

## 2025-04-16 RX ORDER — FERROUS SULFATE 324(65)MG
324 TABLET, DELAYED RELEASE (ENTERIC COATED) ORAL
Qty: 90 TABLET | Refills: 1 | Status: SHIPPED | OUTPATIENT
Start: 2025-04-16

## 2025-04-16 NOTE — TELEPHONE ENCOUNTER
Patient calling to follow up with her yearly exam yesterday. Doesn't see appointment note or AVS on her MyChart. Additionally report medications we supposed to sen tin for her (iron, naproxen, and and antibiotic) but doesn't seem like they were.    Message to Dr. Urban.

## 2025-04-16 NOTE — TELEPHONE ENCOUNTER
Attempted to call patient to advise, however was unable to reach her by phone. Left a non-detailed voicemail to call the office back at [phone].

## 2025-04-29 ENCOUNTER — TELEPHONE (OUTPATIENT)
Age: 25
End: 2025-04-29

## 2025-04-29 DIAGNOSIS — D50.9 IRON DEFICIENCY ANEMIA, UNSPECIFIED IRON DEFICIENCY ANEMIA TYPE: ICD-10-CM

## 2025-04-29 DIAGNOSIS — D75.839 THROMBOCYTOSIS: Primary | ICD-10-CM

## 2025-04-29 RX ORDER — SODIUM CHLORIDE 9 MG/ML
20 INJECTION, SOLUTION INTRAVENOUS ONCE
OUTPATIENT
Start: 2025-05-14

## 2025-04-29 RX ORDER — DIPHENHYDRAMINE HCL 25 MG
25 TABLET ORAL ONCE
OUTPATIENT
Start: 2025-05-14 | End: 2025-05-14

## 2025-04-29 NOTE — PROGRESS NOTES
Spoke with the patient.  Patient understands that her insurance did not cover treatment with Feraheme.  Discussed that ferritin are listed has been requested and substitute.  I went over side effect toxicity profile changes in treatment.  Patient is willing to trial it however, she is only able to commit to 4 cycles.  This is acceptable.  Patient's follow-up appointment and laboratory assessment will be transition to 3 months from now.  Patient voiced understanding and agreement.  She understands call the office with any questions or concerns.    Of note previous reaction to Venofer occurred while the patient was pregnant.  I believe that that toxicity and side effect profile had little to do with Venofer but more her loss was related to other symptoms of pregnancy.  Patient is comfortable with Ferrlecit.

## 2025-04-29 NOTE — TELEPHONE ENCOUNTER
Patient called with concerns of her infusion treatments. She had called to reschedule the original ones she had, and the infusion center told her it was for 8 treatments when she was told it was only for 2 and then they had told her that insurance denied them as well

## 2025-05-14 ENCOUNTER — APPOINTMENT (OUTPATIENT)
Dept: LAB | Facility: MEDICAL CENTER | Age: 25
End: 2025-05-14
Attending: PHYSICIAN ASSISTANT
Payer: COMMERCIAL

## 2025-05-14 DIAGNOSIS — D75.839 THROMBOCYTOSIS: ICD-10-CM

## 2025-05-14 DIAGNOSIS — D50.9 IRON DEFICIENCY ANEMIA, UNSPECIFIED IRON DEFICIENCY ANEMIA TYPE: ICD-10-CM

## 2025-05-14 LAB
FERRITIN SERPL-MCNC: 9 NG/ML (ref 30–307)
IRON SATN MFR SERPL: 8 % (ref 15–50)
IRON SERPL-MCNC: 29 UG/DL (ref 50–212)
TIBC SERPL-MCNC: 364 UG/DL (ref 250–450)
TRANSFERRIN SERPL-MCNC: 260 MG/DL (ref 203–362)
UIBC SERPL-MCNC: 335 UG/DL (ref 155–355)

## 2025-05-14 PROCEDURE — 82728 ASSAY OF FERRITIN: CPT

## 2025-05-14 PROCEDURE — 36415 COLL VENOUS BLD VENIPUNCTURE: CPT

## 2025-05-14 PROCEDURE — 83540 ASSAY OF IRON: CPT

## 2025-05-14 PROCEDURE — 83550 IRON BINDING TEST: CPT

## 2025-05-17 ENCOUNTER — RESULTS FOLLOW-UP (OUTPATIENT)
Dept: LABOR AND DELIVERY | Facility: HOSPITAL | Age: 25
End: 2025-05-17

## 2025-05-21 ENCOUNTER — HOSPITAL ENCOUNTER (OUTPATIENT)
Dept: INFUSION CENTER | Facility: CLINIC | Age: 25
Discharge: HOME | End: 2025-05-21
Attending: INTERNAL MEDICINE
Payer: COMMERCIAL

## 2025-05-21 VITALS
TEMPERATURE: 97.6 F | RESPIRATION RATE: 16 BRPM | HEART RATE: 81 BPM | DIASTOLIC BLOOD PRESSURE: 76 MMHG | SYSTOLIC BLOOD PRESSURE: 122 MMHG

## 2025-05-21 DIAGNOSIS — D50.9 IRON DEFICIENCY ANEMIA, UNSPECIFIED IRON DEFICIENCY ANEMIA TYPE: ICD-10-CM

## 2025-05-21 DIAGNOSIS — D75.839 THROMBOCYTOSIS: Primary | ICD-10-CM

## 2025-05-21 RX ORDER — DIPHENHYDRAMINE HCL 25 MG
25 TABLET ORAL ONCE
Status: COMPLETED | OUTPATIENT
Start: 2025-05-21 | End: 2025-05-21

## 2025-05-21 RX ORDER — SODIUM CHLORIDE 9 MG/ML
20 INJECTION, SOLUTION INTRAVENOUS ONCE
OUTPATIENT
Start: 2025-05-29

## 2025-05-21 RX ORDER — DIPHENHYDRAMINE HCL 25 MG
25 TABLET ORAL ONCE
OUTPATIENT
Start: 2025-05-29 | End: 2025-05-28

## 2025-05-21 RX ORDER — SODIUM CHLORIDE 9 MG/ML
20 INJECTION, SOLUTION INTRAVENOUS ONCE
Status: COMPLETED | OUTPATIENT
Start: 2025-05-21 | End: 2025-05-21

## 2025-05-21 RX ADMIN — DIPHENHYDRAMINE HYDROCHLORIDE 25 MG: 25 TABLET ORAL at 09:51

## 2025-05-21 RX ADMIN — SODIUM CHLORIDE 20 ML/HR: 0.9 INJECTION, SOLUTION INTRAVENOUS at 09:25

## 2025-05-21 RX ADMIN — SODIUM CHLORIDE 125 MG: 9 INJECTION, SOLUTION INTRAVENOUS at 10:31

## 2025-05-21 NOTE — PROGRESS NOTES
Pt. Tolerated Ferrlecit without adverse event. Pt. Discharged in stable condition.  Pt. Will return 5/29/25 at 0800.

## 2025-05-21 NOTE — PLAN OF CARE
Problem: INFECTION - ADULT  Goal: Absence or prevention of progression during hospitalization  Description: INTERVENTIONS:  - Assess and monitor for signs and symptoms of infection  - Monitor lab/diagnostic results  - Monitor all insertion sites, i.e. indwelling lines, tubes, and drains  - Monitor endotracheal if appropriate and nasal secretions for changes in amount and color  - [place] appropriate cooling/warming therapies per order  - Administer medications as ordered  - Instruct and encourage patient and family to use good hand hygiene technique  - Identify and instruct in appropriate isolation precautions for identified infection/condition  Outcome: Progressing  Goal: Absence of fever/infection during neutropenic period  Description: INTERVENTIONS:  - Monitor WBC  - Perform strict hand hygiene  - Limit to healthy visitors only  - No plants, dried, fresh or silk flowers with putnam in patient room  Outcome: Progressing     Problem: Knowledge Deficit  Goal: Patient/family/caregiver demonstrates understanding of disease process, treatment plan, medications, and discharge instructions  Description: Complete learning assessment and assess knowledge base.  Interventions:  - Provide teaching at level of understanding  - Provide teaching via preferred learning methods  Outcome: Progressing

## 2025-05-21 NOTE — PROGRESS NOTES
Pt. Full of anxiety on arrival due to IV and thoughts that she had a reaction last August.  Physician told Pt. She did not have a reaction based on symptoms discussed. Benadryl ordered today.

## 2025-05-29 ENCOUNTER — TELEPHONE (OUTPATIENT)
Age: 25
End: 2025-05-29

## 2025-05-29 ENCOUNTER — HOSPITAL ENCOUNTER (OUTPATIENT)
Dept: INFUSION CENTER | Facility: CLINIC | Age: 25
Discharge: HOME | End: 2025-05-29
Attending: INTERNAL MEDICINE
Payer: COMMERCIAL

## 2025-05-29 VITALS
SYSTOLIC BLOOD PRESSURE: 117 MMHG | DIASTOLIC BLOOD PRESSURE: 78 MMHG | TEMPERATURE: 97.4 F | RESPIRATION RATE: 16 BRPM | HEART RATE: 90 BPM

## 2025-05-29 DIAGNOSIS — D50.9 IRON DEFICIENCY ANEMIA, UNSPECIFIED IRON DEFICIENCY ANEMIA TYPE: ICD-10-CM

## 2025-05-29 DIAGNOSIS — D75.839 THROMBOCYTOSIS: Primary | ICD-10-CM

## 2025-05-29 PROCEDURE — 96365 THER/PROPH/DIAG IV INF INIT: CPT

## 2025-05-29 RX ORDER — SODIUM CHLORIDE 9 MG/ML
20 INJECTION, SOLUTION INTRAVENOUS ONCE
Status: CANCELLED | OUTPATIENT
Start: 2025-06-04

## 2025-05-29 RX ORDER — DIPHENHYDRAMINE HCL 25 MG
25 TABLET ORAL ONCE
Status: CANCELLED | OUTPATIENT
Start: 2025-06-04 | End: 2025-06-05

## 2025-05-29 RX ORDER — DIPHENHYDRAMINE HCL 25 MG
25 TABLET ORAL ONCE
Status: CANCELLED | OUTPATIENT
Start: 2025-06-04 | End: 2025-06-04

## 2025-05-29 RX ORDER — DIPHENHYDRAMINE HCL 25 MG
25 TABLET ORAL ONCE
Status: DISCONTINUED | OUTPATIENT
Start: 2025-05-29 | End: 2025-06-01 | Stop reason: HOSPADM

## 2025-05-29 RX ORDER — SODIUM CHLORIDE 9 MG/ML
20 INJECTION, SOLUTION INTRAVENOUS ONCE
Status: COMPLETED | OUTPATIENT
Start: 2025-05-29 | End: 2025-05-29

## 2025-05-29 RX ADMIN — SODIUM CHLORIDE 125 MG: 9 INJECTION, SOLUTION INTRAVENOUS at 08:56

## 2025-05-29 RX ADMIN — SODIUM CHLORIDE 20 ML/HR: 0.9 INJECTION, SOLUTION INTRAVENOUS at 08:41

## 2025-05-29 NOTE — TELEPHONE ENCOUNTER
Left message for patient stating I rescheduled her appointment to 8/27/25 at 8:30 and she is to have blood work prior to her appointment.

## 2025-05-29 NOTE — TELEPHONE ENCOUNTER
Patient called to clarify when she needs labs and if she is supposed to see Judith HWANG as scheduled on 6/9. Upon review of chart it looks like she should have labs at the end of July and then be seen after that but patient wanted to confirm nothing is needed in between and that this appointment can be moved. Please review and advise patient. [phone]

## 2025-05-29 NOTE — PROGRESS NOTES
Mary Jane Reyes  tolerated Ferrlecit without incident.   Mary Jane Reyes is aware of future appt on 6/4 at 8am.     AVS printed and given to Mary Jane Reyes:    No (Declined by Mary Jane Reyes)

## 2025-05-29 NOTE — TELEPHONE ENCOUNTER
Her appt was to be rescheduled from by 3 months.  I would recommend scheduling her to August with blood work prior to that appointment.

## 2025-06-04 ENCOUNTER — HOSPITAL ENCOUNTER (OUTPATIENT)
Dept: INFUSION CENTER | Facility: CLINIC | Age: 25
Discharge: HOME | End: 2025-06-04
Attending: INTERNAL MEDICINE
Payer: COMMERCIAL

## 2025-06-04 DIAGNOSIS — D50.9 IRON DEFICIENCY ANEMIA, UNSPECIFIED IRON DEFICIENCY ANEMIA TYPE: ICD-10-CM

## 2025-06-04 DIAGNOSIS — D75.839 THROMBOCYTOSIS: Primary | ICD-10-CM

## 2025-06-04 PROCEDURE — 96365 THER/PROPH/DIAG IV INF INIT: CPT

## 2025-06-04 RX ORDER — DIPHENHYDRAMINE HCL 25 MG
25 TABLET ORAL ONCE
Status: DISCONTINUED | OUTPATIENT
Start: 2025-06-04 | End: 2025-06-07 | Stop reason: HOSPADM

## 2025-06-04 RX ORDER — SODIUM CHLORIDE 9 MG/ML
20 INJECTION, SOLUTION INTRAVENOUS ONCE
Status: COMPLETED | OUTPATIENT
Start: 2025-06-04 | End: 2025-06-04

## 2025-06-04 RX ORDER — DIPHENHYDRAMINE HCL 25 MG
25 TABLET ORAL ONCE
Status: CANCELLED | OUTPATIENT
Start: 2025-06-11 | End: 2025-06-05

## 2025-06-04 RX ORDER — SODIUM CHLORIDE 9 MG/ML
20 INJECTION, SOLUTION INTRAVENOUS ONCE
Status: CANCELLED | OUTPATIENT
Start: 2025-06-11

## 2025-06-04 RX ADMIN — SODIUM CHLORIDE 20 ML/HR: 9 INJECTION, SOLUTION INTRAVENOUS at 08:29

## 2025-06-04 RX ADMIN — SODIUM CHLORIDE 125 MG: 9 INJECTION, SOLUTION INTRAVENOUS at 08:29

## 2025-06-04 NOTE — PROGRESS NOTES
Pt presents for ferrlecit. No new meds or concerns. Pt tolerated treatment without adverse reaction. Future appointments discussed, confirmed with patient for 6/11/25 0800. AVS declined.

## 2025-06-16 ENCOUNTER — NURSE TRIAGE (OUTPATIENT)
Age: 25
End: 2025-06-16

## 2025-06-16 ENCOUNTER — OFFICE VISIT (OUTPATIENT)
Dept: SURGERY | Facility: CLINIC | Age: 25
End: 2025-06-16
Payer: COMMERCIAL

## 2025-06-16 VITALS
TEMPERATURE: 98.6 F | WEIGHT: 189 LBS | HEIGHT: 63 IN | SYSTOLIC BLOOD PRESSURE: 118 MMHG | HEART RATE: 64 BPM | BODY MASS INDEX: 33.49 KG/M2 | RESPIRATION RATE: 16 BRPM | OXYGEN SATURATION: 99 % | DIASTOLIC BLOOD PRESSURE: 78 MMHG

## 2025-06-16 DIAGNOSIS — L02.214 GROIN ABSCESS: Primary | ICD-10-CM

## 2025-06-16 PROCEDURE — 99213 OFFICE O/P EST LOW 20 MIN: CPT

## 2025-06-16 RX ORDER — GABAPENTIN 300 MG/1
300 CAPSULE ORAL 3 TIMES DAILY
COMMUNITY
Start: 2025-06-11

## 2025-06-16 RX ORDER — TOPIRAMATE 100 MG/1
1 TABLET, FILM COATED ORAL 2 TIMES DAILY
COMMUNITY
Start: 2025-06-11

## 2025-06-16 RX ORDER — CEPHALEXIN 500 MG/1
500 CAPSULE ORAL EVERY 8 HOURS SCHEDULED
Qty: 21 CAPSULE | Refills: 0 | Status: SHIPPED | OUTPATIENT
Start: 2025-06-16 | End: 2025-06-23

## 2025-06-16 RX ORDER — BUPROPION HYDROCHLORIDE 150 MG/1
TABLET ORAL
COMMUNITY
Start: 2025-06-12

## 2025-06-16 NOTE — PROGRESS NOTES
Name: Mary Jane Reyes      : 2000      MRN: [id number]  Encounter Provider: GORDO Nicole  Encounter Date: 2025   Encounter department: [facility] [place]  :  Assessment & Plan  Groin abscess  Patient is here to be evaluated concerning her bilateral groin cyst sites she had excised in 2025 that have opened last week and have increasingly become painful with yellow drainage.  These bilateral groin prior surgical excisions are slightly opened. Right groin has a superficial opening of granulation tissue about 1 cm. Left groin has tiny opening approx 3-4mm opening. There are superficially open with fresh granulation tissue with no deeper pockets or tunneling. However, both are draining slight yellow drainage. Denies fever, chills.Will prescribe Keflex as it has worked in the past for patient but she has some GI intolerance at times. Patient would like to still try Keflex and advised to take with food and may also take a probiotic with course of antibiotics to help. Provided patient with dressing and to keep area covered to avoid further friction. If has any issues, patient will call sooner. Will have her follow next week. All questions asked and answered.     Orders:    cephalexin (KEFLEX) 500 mg capsule; Take 1 capsule (500 mg total) by mouth every 8 (eight) hours for 7 days        History of Present Illness   HPI  Mary Jane Reyes is a 25 y.o. female who presents for evaluation of prior groin cyst excision sites. States she has them excised back in February and have on and off closed and opened. However, last week she felt her left side open and began to cause more pain and become red. Since wearing sanitary pads with her menstrual cycle, she feels the friction and rubbing has caused the right groin to open with redness. Per patient,right has been draining more yellow to green fluid and painful to touch. Has not been covering the areas. Denies fever,  "chills.       Review of Systems   Constitutional:  Negative for chills and fever.   Eyes:  Negative for pain and visual disturbance.   Respiratory:  Negative for cough and shortness of breath.    Cardiovascular:  Negative for chest pain and palpitations.   Gastrointestinal:  Negative for abdominal pain and vomiting.   Genitourinary:  Negative for dysuria and hematuria.   Musculoskeletal:  Negative for arthralgias and back pain.   Skin:  Negative for color change and rash.   Neurological:  Negative for seizures and syncope.   All other systems reviewed and are negative.         Objective   /78 (BP Location: Left arm, Patient Position: Sitting, Cuff Size: Large)   Pulse 64   Temp 98.6 °F (37 °C) (Tympanic)   Resp 16   Ht 5' 3\" (1.6 m)   Wt 85.7 kg (189 lb)   LMP 06/16/2025 (Exact Date)   SpO2 99%   BMI 33.48 kg/m²      Physical Exam  Vitals and nursing note reviewed.   Constitutional:       General: She is not in acute distress.     Appearance: She is well-developed.   HENT:      Head: Normocephalic and atraumatic.     Eyes:      Conjunctiva/sclera: Conjunctivae normal.       Cardiovascular:      Rate and Rhythm: Normal rate and regular rhythm.      Heart sounds: No murmur heard.  Pulmonary:      Effort: Pulmonary effort is normal. No respiratory distress.      Breath sounds: Normal breath sounds.   Abdominal:      Palpations: Abdomen is soft.      Tenderness: There is no abdominal tenderness.   Genitourinary:       Comments: Bilateral groin incision sites superficially open with erythema, no fluctuance. No deeper pockets or tunnels. Some scant purulent drainage. Tender to touch.    Musculoskeletal:         General: No swelling.      Cervical back: Neck supple.     Skin:     General: Skin is warm and dry.      Capillary Refill: Capillary refill takes less than 2 seconds.     Neurological:      Mental Status: She is alert and oriented to person, place, and time.     Psychiatric:         Mood and Affect: " Mood normal.

## 2025-06-16 NOTE — TELEPHONE ENCOUNTER
"REASON FOR CONVERSATION: Post-op Problem    SYMPTOMS:   Pt called in to report Left side incision had opened and has been continuously open and closing x several months. Now has scant amount drainage.   Right side incision has drainage of yellow and green pus x 2 days.     OTHER HEALTH INFORMATION: Pt concerned about gaping incisions and states she feels as though they are infected.     PROTOCOL DISPOSITION: Go to [facility]    CARE ADVICE PROVIDED: Wash incision with soap and water and pat dry. Call back with new or worsening symptoms.     PRACTICE FOLLOW-UP: AYAN Scheduled for 11:30 today.     Reason for Disposition   Pus or bad-smelling fluid draining from incision    Answer Assessment - Initial Assessment Questions  1. SYMPTOM: \"What's the main symptom you're concerned about?\" (e.g., drainage, incision opened up, pain, redness)      Incisions opened up bilaterally and are draining pus   2. ONSET: \"When did incisions open/drain  start?\"      Left side x several months and right side x 2 days   3. SURGERY: \"What surgery did you have?\"      EXCISION RECURRENT B/L GROIN CYST (Bilateral: Groin)  4. DATE of SURGERY: \"When was the surgery?\"       2/13/25  5. INCISION SITE: \"Where is the incision located?\"       Groin   7. PAIN: \"Is there any pain?\" If Yes, ask: \"How bad is it?\"  (Scale 1-10; or mild, moderate, severe)      Moderate pain at times   8. BLEEDING: \"Is there any bleeding?\" If Yes, ask: \"How much?\" and \"Where?\"      Denies   9. DRAINAGE: \"Is there any drainage from the incision site?\" If Yes, ask: \"What color and how much?\" (e.g., red, cloudy, pus; drops, teaspoon)      Yes, pus    Protocols used: Post-Op Incision Symptoms and Questions-Adult-[place]    "

## 2025-06-18 ENCOUNTER — HOSPITAL ENCOUNTER (OUTPATIENT)
Dept: INFUSION CENTER | Facility: CLINIC | Age: 25
Discharge: HOME | End: 2025-06-18
Attending: INTERNAL MEDICINE
Payer: COMMERCIAL

## 2025-06-18 VITALS
HEART RATE: 54 BPM | DIASTOLIC BLOOD PRESSURE: 76 MMHG | SYSTOLIC BLOOD PRESSURE: 120 MMHG | TEMPERATURE: 97.4 F | RESPIRATION RATE: 16 BRPM

## 2025-06-18 DIAGNOSIS — D75.839 THROMBOCYTOSIS: ICD-10-CM

## 2025-06-18 DIAGNOSIS — D50.9 IRON DEFICIENCY ANEMIA, UNSPECIFIED IRON DEFICIENCY ANEMIA TYPE: Primary | ICD-10-CM

## 2025-06-18 PROCEDURE — 96365 THER/PROPH/DIAG IV INF INIT: CPT

## 2025-06-18 RX ORDER — DIPHENHYDRAMINE HCL 25 MG
25 TABLET ORAL ONCE
OUTPATIENT
Start: 2025-06-18 | End: 2025-06-18

## 2025-06-18 RX ORDER — SODIUM CHLORIDE 9 MG/ML
20 INJECTION, SOLUTION INTRAVENOUS ONCE
Status: CANCELLED | OUTPATIENT
Start: 2025-06-18

## 2025-06-18 RX ORDER — SODIUM CHLORIDE 9 MG/ML
20 INJECTION, SOLUTION INTRAVENOUS ONCE
Status: COMPLETED | OUTPATIENT
Start: 2025-06-18 | End: 2025-06-18

## 2025-06-18 RX ADMIN — SODIUM CHLORIDE 125 MG: 9 INJECTION, SOLUTION INTRAVENOUS at 09:14

## 2025-06-18 RX ADMIN — SODIUM CHLORIDE 20 ML/HR: 0.9 INJECTION, SOLUTION INTRAVENOUS at 09:01

## 2025-06-18 NOTE — PROGRESS NOTES
Pt. Denied new symptoms or concerns today. Ferrlecit tolerated well without adverse event. There are no future appointments at this time.  Pt. Will follow up with labs and physician as instructed. AVS declined.

## 2025-06-18 NOTE — PLAN OF CARE
Problem: INFECTION - ADULT  Goal: Absence or prevention of progression during hospitalization  Description: INTERVENTIONS:  - Assess and monitor for signs and symptoms of infection  - Monitor lab/diagnostic results  - Monitor all insertion sites, i.e. indwelling lines, tubes, and drains  - Monitor endotracheal if appropriate and nasal secretions for changes in amount and color  - [place] appropriate cooling/warming therapies per order  - Administer medications as ordered  - Instruct and encourage patient and family to use good hand hygiene technique  - Identify and instruct in appropriate isolation precautions for identified infection/condition  Outcome: Progressing  Goal: Absence of fever/infection during neutropenic period  Description: INTERVENTIONS:  - Monitor WBC  - Perform strict hand hygiene  - Limit to healthy visitors only  - No plants, dried, fresh or silk flowers with putnam in patient room  Outcome: Progressing     Problem: Knowledge Deficit  Goal: Patient/family/caregiver demonstrates understanding of disease process, treatment plan, medications, and discharge instructions  Description: Complete learning assessment and assess knowledge base.  Interventions:  - Provide teaching at level of understanding  - Provide teaching via preferred learning methods  Outcome: Progressing

## 2025-06-18 NOTE — PROGRESS NOTES
Name: Mary Jane Reyes      : 2000      MRN: [id number]  Encounter Provider: GORDO Nicole  Encounter Date: 2025   Encounter department: [facility] GENER[facility] SUR[facility] [place]  :  Assessment & Plan  Encounter for postoperative wound check  Patient is doing well. Left groin has healed. Right groin is improving with minimal yellow to no drainage. Right groin has superficial opening with fresh granulation tissue about 1 cm. Denies any pain at either groin. Denies fever, chills. It is continuing to improve and will try silvadene. Advised to keep area clean and covered to prevent friction in the area. Will have patient return in 2 weeks or sooner if any issues arise.    Orders:    silver sulfadiazine (SILVADENE,SSD) 1 % cream; Apply topically daily         History of Present Illness   HPI  Mary Jane Reyes is a 25 y.o. female who presents for evaluation of prior groin cyst excision sites. States she has them excised back in February and have on and off closed and opened. However, last week she felt her left side open and began to cause more pain and become red. Since wearing sanitary pads with her menstrual cycle, she feels the friction and rubbing has caused the right groin to open with redness. Per patient,right has been draining more yellow to green fluid and painful to touch. Has not been covering the areas. Denies fever, chills.     2025: Doing well. Left side has closed and having no pain or drainage from that site. Right groin has decreased in drainage. No pain at right groin. Some redness but underwear has been rubbing. Finished Keflex with no issues yesterday. Denies fever, chills.     Review of Systems   Constitutional:  Negative for chills and fever.   Eyes:  Negative for pain and visual disturbance.   Respiratory:  Negative for cough and shortness of breath.    Cardiovascular:  Negative for chest pain and palpitations.   Gastrointestinal:  Negative for  "abdominal pain and vomiting.   Genitourinary:  Negative for dysuria and hematuria.   Musculoskeletal:  Negative for arthralgias and back pain.   Skin:  Negative for color change and rash.   Neurological:  Negative for seizures and syncope.   All other systems reviewed and are negative.         Objective   /84 (BP Location: Left arm, Patient Position: Sitting, Cuff Size: Large)   Pulse 79   Temp 98.9 °F (37.2 °C) (Tympanic)   Resp 16   Ht 5' 3\" (1.6 m)   Wt 83.5 kg (184 lb)   LMP 06/16/2025 (Exact Date)   SpO2 99%   BMI 32.59 kg/m²      Physical Exam  Vitals and nursing note reviewed.   Constitutional:       General: She is not in acute distress.     Appearance: She is well-developed.   HENT:      Head: Normocephalic and atraumatic.     Eyes:      Conjunctiva/sclera: Conjunctivae normal.       Cardiovascular:      Rate and Rhythm: Normal rate and regular rhythm.      Heart sounds: No murmur heard.  Pulmonary:      Effort: Pulmonary effort is normal. No respiratory distress.      Breath sounds: Normal breath sounds.   Abdominal:      Palpations: Abdomen is soft.      Tenderness: There is no abdominal tenderness.   Genitourinary:       Comments: Left groin healed closed. Right groin site superficially open with slight redness. No deeper pockets or tunnels. Minimal drainage. Non tender to touch.    Musculoskeletal:         General: No swelling.      Cervical back: Neck supple.     Skin:     General: Skin is warm and dry.      Capillary Refill: Capillary refill takes less than 2 seconds.     Neurological:      Mental Status: She is alert and oriented to person, place, and time.     Psychiatric:         Mood and Affect: Mood normal.           "

## 2025-06-19 ENCOUNTER — TELEPHONE (OUTPATIENT)
Age: 25
End: 2025-06-19

## 2025-06-20 NOTE — TELEPHONE ENCOUNTER
Returned telephone call left voice message.  Judith MARTINES has ordered labs for your scheduled fu appt in August.  Please have them drawn closer to the August appt.

## 2025-06-23 ENCOUNTER — OFFICE VISIT (OUTPATIENT)
Dept: SURGERY | Facility: CLINIC | Age: 25
End: 2025-06-23
Payer: COMMERCIAL

## 2025-06-23 VITALS
BODY MASS INDEX: 32.6 KG/M2 | HEIGHT: 63 IN | RESPIRATION RATE: 16 BRPM | WEIGHT: 184 LBS | TEMPERATURE: 98.9 F | OXYGEN SATURATION: 99 % | HEART RATE: 79 BPM | DIASTOLIC BLOOD PRESSURE: 84 MMHG | SYSTOLIC BLOOD PRESSURE: 124 MMHG

## 2025-06-23 DIAGNOSIS — Z48.89 ENCOUNTER FOR POSTOPERATIVE WOUND CHECK: Primary | ICD-10-CM

## 2025-06-23 PROCEDURE — 99213 OFFICE O/P EST LOW 20 MIN: CPT

## 2025-06-23 RX ORDER — NALTREXONE HYDROCHLORIDE 50 MG/1
50 TABLET, FILM COATED ORAL DAILY
COMMUNITY

## 2025-06-23 RX ORDER — SILVER SULFADIAZINE 10 MG/G
CREAM TOPICAL DAILY
Qty: 25 G | Refills: 0 | Status: SHIPPED | OUTPATIENT
Start: 2025-06-23

## 2025-06-24 NOTE — TELEPHONE ENCOUNTER
Patient calling back in, would like a call back to explain further if she can get labs done now to check if she needs more infusions before the 3 month cassie. Please call her back at [phone], she stated the last vm cut off and she will try to answer when she gets the call.

## 2025-06-24 NOTE — TELEPHONE ENCOUNTER
Sent a My Chart message to pt to ask her to have labs in Aug as her last iron infusion was just 6/18/25

## 2025-07-24 ENCOUNTER — APPOINTMENT (OUTPATIENT)
Dept: LAB | Facility: MEDICAL CENTER | Age: 25
End: 2025-07-24
Payer: COMMERCIAL

## 2025-07-24 DIAGNOSIS — D75.839 THROMBOCYTOSIS: ICD-10-CM

## 2025-07-24 DIAGNOSIS — D50.9 IRON DEFICIENCY ANEMIA, UNSPECIFIED IRON DEFICIENCY ANEMIA TYPE: ICD-10-CM

## 2025-07-24 LAB
FERRITIN SERPL-MCNC: 63 NG/ML (ref 30–307)
IRON SATN MFR SERPL: 16 % (ref 15–50)
IRON SERPL-MCNC: 43 UG/DL (ref 50–212)
TIBC SERPL-MCNC: 275.8 UG/DL (ref 250–450)
TRANSFERRIN SERPL-MCNC: 197 MG/DL (ref 203–362)
UIBC SERPL-MCNC: 233 UG/DL (ref 155–355)

## 2025-07-24 PROCEDURE — 83540 ASSAY OF IRON: CPT

## 2025-07-24 PROCEDURE — 82728 ASSAY OF FERRITIN: CPT

## 2025-07-24 PROCEDURE — 36415 COLL VENOUS BLD VENIPUNCTURE: CPT

## 2025-07-24 PROCEDURE — 83550 IRON BINDING TEST: CPT

## 2025-08-18 ENCOUNTER — APPOINTMENT (OUTPATIENT)
Dept: LAB | Facility: MEDICAL CENTER | Age: 25
End: 2025-08-18
Payer: COMMERCIAL

## 2025-08-18 DIAGNOSIS — D50.9 IRON DEFICIENCY ANEMIA, UNSPECIFIED IRON DEFICIENCY ANEMIA TYPE: ICD-10-CM

## 2025-08-18 DIAGNOSIS — N91.2 AMENORRHEA: ICD-10-CM

## 2025-08-18 DIAGNOSIS — D75.839 THROMBOCYTOSIS: ICD-10-CM

## 2025-08-18 LAB
B-HCG SERPL-ACNC: 6216 MIU/ML (ref 0–5)
BASOPHILS # BLD AUTO: 0.05 THOUSANDS/ÂΜL (ref 0–0.1)
BASOPHILS NFR BLD AUTO: 1 % (ref 0–1)
EOSINOPHIL # BLD AUTO: 0.16 THOUSAND/ÂΜL (ref 0–0.61)
EOSINOPHIL NFR BLD AUTO: 1 % (ref 0–6)
ERYTHROCYTE [DISTWIDTH] IN BLOOD BY AUTOMATED COUNT: 15.5 % (ref 11.6–15.1)
FERRITIN SERPL-MCNC: 63 NG/ML (ref 30–307)
HCT VFR BLD AUTO: 39.5 % (ref 34.8–46.1)
HGB BLD-MCNC: 13.1 G/DL (ref 11.5–15.4)
IMM GRANULOCYTES # BLD AUTO: 0.04 THOUSAND/UL (ref 0–0.2)
IMM GRANULOCYTES NFR BLD AUTO: 0 % (ref 0–2)
LYMPHOCYTES # BLD AUTO: 4.1 THOUSANDS/ÂΜL (ref 0.6–4.47)
LYMPHOCYTES NFR BLD AUTO: 37 % (ref 14–44)
MCH RBC QN AUTO: 30.8 PG (ref 26.8–34.3)
MCHC RBC AUTO-ENTMCNC: 33.2 G/DL (ref 31.4–37.4)
MCV RBC AUTO: 93 FL (ref 82–98)
MONOCYTES # BLD AUTO: 0.68 THOUSAND/ÂΜL (ref 0.17–1.22)
MONOCYTES NFR BLD AUTO: 6 % (ref 4–12)
NEUTROPHILS # BLD AUTO: 6.05 THOUSANDS/ÂΜL (ref 1.85–7.62)
NEUTS SEG NFR BLD AUTO: 55 % (ref 43–75)
NRBC BLD AUTO-RTO: 0 /100 WBCS
PLATELET # BLD AUTO: 505 THOUSANDS/UL (ref 149–390)
PMV BLD AUTO: 10.4 FL (ref 8.9–12.7)
RBC # BLD AUTO: 4.26 MILLION/UL (ref 3.81–5.12)
WBC # BLD AUTO: 11.08 THOUSAND/UL (ref 4.31–10.16)

## 2025-08-18 PROCEDURE — 85025 COMPLETE CBC W/AUTO DIFF WBC: CPT

## 2025-08-18 PROCEDURE — 82728 ASSAY OF FERRITIN: CPT

## 2025-08-18 PROCEDURE — 84702 CHORIONIC GONADOTROPIN TEST: CPT

## 2025-08-18 PROCEDURE — 36415 COLL VENOUS BLD VENIPUNCTURE: CPT

## 2025-08-19 ENCOUNTER — TELEPHONE (OUTPATIENT)
Age: 25
End: 2025-08-19

## 2025-08-19 DIAGNOSIS — D75.839 THROMBOCYTOSIS: ICD-10-CM

## 2025-08-19 DIAGNOSIS — D50.9 IRON DEFICIENCY ANEMIA, UNSPECIFIED IRON DEFICIENCY ANEMIA TYPE: Primary | ICD-10-CM

## 2025-08-20 ENCOUNTER — RESULTS FOLLOW-UP (OUTPATIENT)
Dept: HEMATOLOGY ONCOLOGY | Facility: CLINIC | Age: 25
End: 2025-08-20

## (undated) DEVICE — BETHLEHEM UNIVERSAL MINOR GEN: Brand: CARDINAL HEALTH

## (undated) DEVICE — INTENDED FOR TISSUE SEPARATION, AND OTHER PROCEDURES THAT REQUIRE A SHARP SURGICAL BLADE TO PUNCTURE OR CUT.: Brand: BARD-PARKER SAFETY BLADES SIZE 15, STERILE

## (undated) DEVICE — GLOVE INDICATOR PI UNDERGLOVE SZ 8 BLUE

## (undated) DEVICE — EXOFIN PRECISION PEN HIGH VISCOSITY TOPICAL SKIN ADHESIVE: Brand: EXOFIN PRECISION PEN, 1G

## (undated) DEVICE — SUT VICRYL 2-0 SH 27 IN UNDYED J417H

## (undated) DEVICE — SUT ETHILON 2-0 FS 18 IN 664H

## (undated) DEVICE — ANTIBACTERIAL UNDYED BRAIDED (POLYGLACTIN 910), SYNTHETIC ABSORBABLE SUTURE: Brand: COATED VICRYL

## (undated) DEVICE — SCD SEQUENTIAL COMPRESSION COMFORT SLEEVE MEDIUM KNEE LENGTH: Brand: KENDALL SCD

## (undated) DEVICE — GLOVE SRG BIOGEL ECLIPSE 7.5